# Patient Record
Sex: MALE | Race: WHITE | NOT HISPANIC OR LATINO | URBAN - METROPOLITAN AREA
[De-identification: names, ages, dates, MRNs, and addresses within clinical notes are randomized per-mention and may not be internally consistent; named-entity substitution may affect disease eponyms.]

---

## 2021-04-14 ENCOUNTER — EMERGENCY (EMERGENCY)
Facility: HOSPITAL | Age: 56
LOS: 1 days | Discharge: LEFT BEFORE DISCHARGE | End: 2021-04-14
Attending: STUDENT IN AN ORGANIZED HEALTH CARE EDUCATION/TRAINING PROGRAM
Payer: COMMERCIAL

## 2021-04-14 VITALS
HEIGHT: 74 IN | TEMPERATURE: 98 F | WEIGHT: 210.1 LBS | HEART RATE: 80 BPM | SYSTOLIC BLOOD PRESSURE: 168 MMHG | DIASTOLIC BLOOD PRESSURE: 98 MMHG | OXYGEN SATURATION: 98 % | RESPIRATION RATE: 16 BRPM

## 2021-04-14 PROCEDURE — 99283 EMERGENCY DEPT VISIT LOW MDM: CPT

## 2021-04-14 PROCEDURE — L9991: CPT

## 2021-04-14 PROCEDURE — 93005 ELECTROCARDIOGRAM TRACING: CPT

## 2021-04-14 RX ORDER — SODIUM CHLORIDE 9 MG/ML
3 INJECTION INTRAMUSCULAR; INTRAVENOUS; SUBCUTANEOUS EVERY 8 HOURS
Refills: 0 | Status: DISCONTINUED | OUTPATIENT
Start: 2021-04-14 | End: 2021-04-18

## 2021-04-14 RX ORDER — SODIUM CHLORIDE 9 MG/ML
500 INJECTION INTRAMUSCULAR; INTRAVENOUS; SUBCUTANEOUS ONCE
Refills: 0 | Status: DISCONTINUED | OUTPATIENT
Start: 2021-04-14 | End: 2021-04-18

## 2021-04-14 RX ORDER — KETOROLAC TROMETHAMINE 30 MG/ML
15 SYRINGE (ML) INJECTION ONCE
Refills: 0 | Status: COMPLETED | OUTPATIENT
Start: 2021-04-14 | End: 2021-04-14

## 2021-04-14 NOTE — ED PROVIDER NOTE - CLINICAL SUMMARY MEDICAL DECISION MAKING FREE TEXT BOX
Left without being seen. Pt demanding to leave. See HPI. No reason to deny decisional capacity at this time.

## 2021-04-14 NOTE — ED ADULT TRIAGE NOTE - ARRIVAL FROM
Post-Care Instructions: LIQUID NITROGEN TREATMENT\\n(Cryosurgery)\\n\\n Liquid Nitrogen (LN2) is a cold, liquefied gas with a temperature of 196 degrees below zero Celsius (minus 321 degrees Fahrenheit).  It is used to freeze and destroy superficial skin growths such as warts and keratoses.  It can also be used to treat pre-malignant skin lesions known as actinic keratoses.  LN2 causes stinging and mild pain while the growth is being frozen and then thaws.  The discomfort usually lasts less than fifteen minutes.  On the fingers a throbbing pain will occasionally last  a few hours.\\n After LN2 treatment your skin will become swollen and red and it may blister.  Then a scab will form.  It will fall off by itself in one to three weeks.  The skin growth will come off with the scab, leaving healthy, new skin.\\n Generally, no special care is needed after liquid nitrogen treatment.  Just ignore it.  You can wash your skin as usual and use make-up or other cosmetics.  If clothing irritates the area, cover it with a small bandage (Band-Aid).\\n If a very large or uncomfortable blister develops you may open it with a sterilized needle.  The needle can be sterilized by wiping with rubbing alcohol.  Gently clean the blistered area with soap and water, followed by alcohol.  Insert the needle into the edge of the blister as needed to allow the blister contents to escape.  Press the blister gently to force out the fluid or blood.  This will reduce pain caused by blister fluid pressure.  If a blister re-forms it may be opened again.  Do not remove the top of the blister since its presence helps prevent infection until the new skin beneath can replace it.  Protect open sores or punctured blisters with Polysporin or Bacitracin antibiotic ointment (available without a prescription) under a Band-Aid for 24-48 hours.\\n If you notice any signs of infection such as oozing of a discolored substance (pus), spreading redness, excessive swelling or increased pain usually appearing 2 days or more after the office treatment, please call the office.  Your usual pain reliever (such as Tylenol and aspirin) is normally sufficient to alleviate discomfort.  Intermittent use of an ice pack for 5 to 10 minutes each hour can also be used if needed.  If you have severe pain, please call the office.  Do not pick at crusted areas.  Allow them to come off on their own.\\n If a lesion was treated near your eye, you may notice swelling of one or both lids within 24 hours, so that your lids are partially closed.  This is harmless, will not affect your vision, and will resolve by itself in a day or two.\\n Sometimes LN2 fails or does not completely remove the growth.  This is especially true with larger warts, which often require more than a single treatment for cure.  If the growth is not cured with LN2 and you do not have a scheduled follow-up appointment for further treatment, please call to make a return appointment. Duration Of Freeze Thaw-Cycle (Seconds): 5 Consent: The patient's consent was obtained including but not limited to risks of crusting, scabbing, blistering, scarring, darker or lighter pigmentary change, recurrence, incomplete removal and infection. Total Number Of Aks Treated: 7 Detail Level: Simple Home Render In Bullet Format When Appropriate: No

## 2021-04-14 NOTE — ED PROVIDER NOTE - OBJECTIVE STATEMENT
Pt states that he no longer wants to be seen because "I want to go to an ER close to my house, get me out of here." LWBS.

## 2022-03-11 ENCOUNTER — EMERGENCY (EMERGENCY)
Facility: HOSPITAL | Age: 57
LOS: 1 days | Discharge: LEFT BEFORE DISCHARGE | End: 2022-03-11
Attending: EMERGENCY MEDICINE
Payer: COMMERCIAL

## 2022-03-11 VITALS
TEMPERATURE: 98 F | WEIGHT: 210.32 LBS | HEIGHT: 74 IN | HEART RATE: 86 BPM | RESPIRATION RATE: 20 BRPM | DIASTOLIC BLOOD PRESSURE: 101 MMHG | SYSTOLIC BLOOD PRESSURE: 163 MMHG | OXYGEN SATURATION: 98 %

## 2022-03-11 LAB
ALBUMIN SERPL ELPH-MCNC: 3.5 G/DL — SIGNIFICANT CHANGE UP (ref 3.5–5)
ALP SERPL-CCNC: 94 U/L — SIGNIFICANT CHANGE UP (ref 40–120)
ALT FLD-CCNC: 124 U/L DA — HIGH (ref 10–60)
ANION GAP SERPL CALC-SCNC: 6 MMOL/L — SIGNIFICANT CHANGE UP (ref 5–17)
AST SERPL-CCNC: 149 U/L — HIGH (ref 10–40)
BASOPHILS # BLD AUTO: 0.06 K/UL — SIGNIFICANT CHANGE UP (ref 0–0.2)
BASOPHILS NFR BLD AUTO: 1.1 % — SIGNIFICANT CHANGE UP (ref 0–2)
BILIRUB SERPL-MCNC: 0.7 MG/DL — SIGNIFICANT CHANGE UP (ref 0.2–1.2)
BUN SERPL-MCNC: 5 MG/DL — LOW (ref 7–18)
CALCIUM SERPL-MCNC: 8.3 MG/DL — LOW (ref 8.4–10.5)
CHLORIDE SERPL-SCNC: 109 MMOL/L — HIGH (ref 96–108)
CO2 SERPL-SCNC: 26 MMOL/L — SIGNIFICANT CHANGE UP (ref 22–31)
CREAT SERPL-MCNC: 0.79 MG/DL — SIGNIFICANT CHANGE UP (ref 0.5–1.3)
EGFR: 105 ML/MIN/1.73M2 — SIGNIFICANT CHANGE UP
EOSINOPHIL # BLD AUTO: 0.09 K/UL — SIGNIFICANT CHANGE UP (ref 0–0.5)
EOSINOPHIL NFR BLD AUTO: 1.7 % — SIGNIFICANT CHANGE UP (ref 0–6)
GLUCOSE SERPL-MCNC: 108 MG/DL — HIGH (ref 70–99)
HCT VFR BLD CALC: 45.4 % — SIGNIFICANT CHANGE UP (ref 39–50)
HGB BLD-MCNC: 15.9 G/DL — SIGNIFICANT CHANGE UP (ref 13–17)
IMM GRANULOCYTES NFR BLD AUTO: 0 % — SIGNIFICANT CHANGE UP (ref 0–1.5)
LIDOCAIN IGE QN: 136 U/L — SIGNIFICANT CHANGE UP (ref 73–393)
LYMPHOCYTES # BLD AUTO: 1.72 K/UL — SIGNIFICANT CHANGE UP (ref 1–3.3)
LYMPHOCYTES # BLD AUTO: 32.1 % — SIGNIFICANT CHANGE UP (ref 13–44)
MCHC RBC-ENTMCNC: 34.9 PG — HIGH (ref 27–34)
MCHC RBC-ENTMCNC: 35 GM/DL — SIGNIFICANT CHANGE UP (ref 32–36)
MCV RBC AUTO: 99.8 FL — SIGNIFICANT CHANGE UP (ref 80–100)
MONOCYTES # BLD AUTO: 0.66 K/UL — SIGNIFICANT CHANGE UP (ref 0–0.9)
MONOCYTES NFR BLD AUTO: 12.3 % — SIGNIFICANT CHANGE UP (ref 2–14)
NEUTROPHILS # BLD AUTO: 2.83 K/UL — SIGNIFICANT CHANGE UP (ref 1.8–7.4)
NEUTROPHILS NFR BLD AUTO: 52.8 % — SIGNIFICANT CHANGE UP (ref 43–77)
NRBC # BLD: 0 /100 WBCS — SIGNIFICANT CHANGE UP (ref 0–0)
PLATELET # BLD AUTO: 96 K/UL — LOW (ref 150–400)
POTASSIUM SERPL-MCNC: 3.9 MMOL/L — SIGNIFICANT CHANGE UP (ref 3.5–5.3)
POTASSIUM SERPL-SCNC: 3.9 MMOL/L — SIGNIFICANT CHANGE UP (ref 3.5–5.3)
PROT SERPL-MCNC: 7.4 G/DL — SIGNIFICANT CHANGE UP (ref 6–8.3)
RBC # BLD: 4.55 M/UL — SIGNIFICANT CHANGE UP (ref 4.2–5.8)
RBC # FLD: 12 % — SIGNIFICANT CHANGE UP (ref 10.3–14.5)
SODIUM SERPL-SCNC: 141 MMOL/L — SIGNIFICANT CHANGE UP (ref 135–145)
TSH SERPL-MCNC: 3.12 UU/ML — SIGNIFICANT CHANGE UP (ref 0.34–4.82)
WBC # BLD: 5.36 K/UL — SIGNIFICANT CHANGE UP (ref 3.8–10.5)
WBC # FLD AUTO: 5.36 K/UL — SIGNIFICANT CHANGE UP (ref 3.8–10.5)

## 2022-03-11 PROCEDURE — 36415 COLL VENOUS BLD VENIPUNCTURE: CPT

## 2022-03-11 PROCEDURE — 83690 ASSAY OF LIPASE: CPT

## 2022-03-11 PROCEDURE — 84443 ASSAY THYROID STIM HORMONE: CPT

## 2022-03-11 PROCEDURE — 99283 EMERGENCY DEPT VISIT LOW MDM: CPT

## 2022-03-11 PROCEDURE — 82962 GLUCOSE BLOOD TEST: CPT

## 2022-03-11 PROCEDURE — 93005 ELECTROCARDIOGRAM TRACING: CPT

## 2022-03-11 PROCEDURE — 85025 COMPLETE CBC W/AUTO DIFF WBC: CPT

## 2022-03-11 PROCEDURE — 80053 COMPREHEN METABOLIC PANEL: CPT

## 2022-03-11 PROCEDURE — 93010 ELECTROCARDIOGRAM REPORT: CPT

## 2022-03-11 PROCEDURE — 99285 EMERGENCY DEPT VISIT HI MDM: CPT

## 2022-03-11 NOTE — ED ADULT TRIAGE NOTE - CHIEF COMPLAINT QUOTE
As per patient " I can't feel my body". I have no strength in my upper body started 2 hours ago, numbness to lower extremities x 6 months ago. Pt admits to drinking a glass of wine this afternoon. Pt took an Aspirin this morning. C/o shortness breath, dizziness and palpitation earlier in the day. Denies any no chest pain, headache, lightheadedness, nausea and vomiting.

## 2022-03-11 NOTE — ED ADULT NURSE NOTE - OBJECTIVE STATEMENT
56M presents to ED for "not being to feel my body". Pt states that 3-4 months ago he stopped being able to feel his legs -  pt denies weakness but just endorses numbness. Pt endorsing having 2 glasses of wine prior to arrival. Pt endorsing "occasional" alcohol use.

## 2022-03-11 NOTE — SBIRT NOTE ADULT - NSSBIRTALCPOSREINDET_GEN_A_CORE
Pt admitted to occasional alcohol consumption and claimed not to have any problem with his alcohol usage. Psychoeducation re alcohol and Emotional support provided .

## 2022-03-11 NOTE — ED PROVIDER NOTE - OBJECTIVE STATEMENT
56 y.o male with no PMHx presents that he can't feel his body for the past 6 months, especially his legs. Patient has been to multiple hospitals and had blood work done with no cause found. Patient also relates drinking wine this afternoon. He had palpitations and shortness of breath earlier, but denies any current complaints. Patient states he has been feeling these symptoms ever since his Mother passed in November. He is also undergoing a divorce. Patient denies SI, HI, or drug use.

## 2022-03-11 NOTE — CHART NOTE - NSCHARTNOTEFT_GEN_A_CORE
Pt is a 56 year old male with pmhx of etoh abuse. He came to the ED today complaining  that he could not feel his body for the past 6 months, especially his legs. Pt screened positive for sbirt and was seen at bedside re consult. Pt appeared alert and orientedx3. Kt introduced self and role explained. Pt participated in sbirt screening, admitted to occasional alcohol consumption and claimed not to have any problem with his alcohol usage. Psychoeducation re alcohol and Emotional support provided . Sbirt screen completed in sunrise. Pt was socially cleared but eloped from the ED.

## 2022-03-11 NOTE — ED PROVIDER NOTE - CLINICAL SUMMARY MEDICAL DECISION MAKING FREE TEXT BOX
56 y.o male complaining of lack of feeling of his body. Normal neuro exam. Symptoms may be due to anxiety/stress. Patient is in no acute distress. Get labs, TSH, and reassess.

## 2022-03-13 ENCOUNTER — INPATIENT (INPATIENT)
Facility: HOSPITAL | Age: 57
LOS: 14 days | Discharge: NURSING, REHAB OR LONG-TERM CARE | DRG: 897 | End: 2022-03-28
Attending: STUDENT IN AN ORGANIZED HEALTH CARE EDUCATION/TRAINING PROGRAM | Admitting: STUDENT IN AN ORGANIZED HEALTH CARE EDUCATION/TRAINING PROGRAM
Payer: COMMERCIAL

## 2022-03-13 VITALS
RESPIRATION RATE: 18 BRPM | DIASTOLIC BLOOD PRESSURE: 98 MMHG | WEIGHT: 210.1 LBS | HEIGHT: 74 IN | HEART RATE: 96 BPM | SYSTOLIC BLOOD PRESSURE: 144 MMHG | TEMPERATURE: 99 F | OXYGEN SATURATION: 96 %

## 2022-03-13 DIAGNOSIS — F10.10 ALCOHOL ABUSE, UNCOMPLICATED: ICD-10-CM

## 2022-03-13 DIAGNOSIS — R26.2 DIFFICULTY IN WALKING, NOT ELSEWHERE CLASSIFIED: ICD-10-CM

## 2022-03-13 DIAGNOSIS — R53.1 WEAKNESS: ICD-10-CM

## 2022-03-13 DIAGNOSIS — Z29.9 ENCOUNTER FOR PROPHYLACTIC MEASURES, UNSPECIFIED: ICD-10-CM

## 2022-03-13 DIAGNOSIS — D69.6 THROMBOCYTOPENIA, UNSPECIFIED: ICD-10-CM

## 2022-03-13 DIAGNOSIS — R74.01 ELEVATION OF LEVELS OF LIVER TRANSAMINASE LEVELS: ICD-10-CM

## 2022-03-13 LAB
ALBUMIN SERPL ELPH-MCNC: 3.8 G/DL — SIGNIFICANT CHANGE UP (ref 3.5–5)
ALP SERPL-CCNC: 94 U/L — SIGNIFICANT CHANGE UP (ref 40–120)
ALT FLD-CCNC: 118 U/L DA — HIGH (ref 10–60)
ANION GAP SERPL CALC-SCNC: 11 MMOL/L — SIGNIFICANT CHANGE UP (ref 5–17)
AST SERPL-CCNC: 125 U/L — HIGH (ref 10–40)
BASOPHILS # BLD AUTO: 0.07 K/UL — SIGNIFICANT CHANGE UP (ref 0–0.2)
BASOPHILS NFR BLD AUTO: 1.2 % — SIGNIFICANT CHANGE UP (ref 0–2)
BILIRUB SERPL-MCNC: 1.1 MG/DL — SIGNIFICANT CHANGE UP (ref 0.2–1.2)
BUN SERPL-MCNC: 7 MG/DL — SIGNIFICANT CHANGE UP (ref 7–18)
CALCIUM SERPL-MCNC: 8.7 MG/DL — SIGNIFICANT CHANGE UP (ref 8.4–10.5)
CHLORIDE SERPL-SCNC: 104 MMOL/L — SIGNIFICANT CHANGE UP (ref 96–108)
CO2 SERPL-SCNC: 24 MMOL/L — SIGNIFICANT CHANGE UP (ref 22–31)
CREAT SERPL-MCNC: 0.83 MG/DL — SIGNIFICANT CHANGE UP (ref 0.5–1.3)
EGFR: 103 ML/MIN/1.73M2 — SIGNIFICANT CHANGE UP
EOSINOPHIL # BLD AUTO: 0.04 K/UL — SIGNIFICANT CHANGE UP (ref 0–0.5)
EOSINOPHIL NFR BLD AUTO: 0.7 % — SIGNIFICANT CHANGE UP (ref 0–6)
ETHANOL SERPL-MCNC: 23 MG/DL — HIGH (ref 0–10)
GLUCOSE SERPL-MCNC: 113 MG/DL — HIGH (ref 70–99)
HCT VFR BLD CALC: 42.4 % — SIGNIFICANT CHANGE UP (ref 39–50)
HGB BLD-MCNC: 14.9 G/DL — SIGNIFICANT CHANGE UP (ref 13–17)
IMM GRANULOCYTES NFR BLD AUTO: 0.2 % — SIGNIFICANT CHANGE UP (ref 0–1.5)
LIDOCAIN IGE QN: 125 U/L — SIGNIFICANT CHANGE UP (ref 73–393)
LYMPHOCYTES # BLD AUTO: 1.07 K/UL — SIGNIFICANT CHANGE UP (ref 1–3.3)
LYMPHOCYTES # BLD AUTO: 18.4 % — SIGNIFICANT CHANGE UP (ref 13–44)
MCHC RBC-ENTMCNC: 34.9 PG — HIGH (ref 27–34)
MCHC RBC-ENTMCNC: 35.1 GM/DL — SIGNIFICANT CHANGE UP (ref 32–36)
MCV RBC AUTO: 99.3 FL — SIGNIFICANT CHANGE UP (ref 80–100)
MONOCYTES # BLD AUTO: 0.77 K/UL — SIGNIFICANT CHANGE UP (ref 0–0.9)
MONOCYTES NFR BLD AUTO: 13.2 % — SIGNIFICANT CHANGE UP (ref 2–14)
NEUTROPHILS # BLD AUTO: 3.86 K/UL — SIGNIFICANT CHANGE UP (ref 1.8–7.4)
NEUTROPHILS NFR BLD AUTO: 66.3 % — SIGNIFICANT CHANGE UP (ref 43–77)
NRBC # BLD: 0 /100 WBCS — SIGNIFICANT CHANGE UP (ref 0–0)
PLATELET # BLD AUTO: 79 K/UL — LOW (ref 150–400)
POTASSIUM SERPL-MCNC: 3.6 MMOL/L — SIGNIFICANT CHANGE UP (ref 3.5–5.3)
POTASSIUM SERPL-SCNC: 3.6 MMOL/L — SIGNIFICANT CHANGE UP (ref 3.5–5.3)
PROT SERPL-MCNC: 7.4 G/DL — SIGNIFICANT CHANGE UP (ref 6–8.3)
RBC # BLD: 4.27 M/UL — SIGNIFICANT CHANGE UP (ref 4.2–5.8)
RBC # FLD: 12 % — SIGNIFICANT CHANGE UP (ref 10.3–14.5)
SARS-COV-2 RNA SPEC QL NAA+PROBE: SIGNIFICANT CHANGE UP
SODIUM SERPL-SCNC: 139 MMOL/L — SIGNIFICANT CHANGE UP (ref 135–145)
TROPONIN I, HIGH SENSITIVITY RESULT: 10.2 NG/L — SIGNIFICANT CHANGE UP
WBC # BLD: 5.82 K/UL — SIGNIFICANT CHANGE UP (ref 3.8–10.5)
WBC # FLD AUTO: 5.82 K/UL — SIGNIFICANT CHANGE UP (ref 3.8–10.5)

## 2022-03-13 PROCEDURE — 71045 X-RAY EXAM CHEST 1 VIEW: CPT | Mod: 26

## 2022-03-13 PROCEDURE — 93010 ELECTROCARDIOGRAM REPORT: CPT

## 2022-03-13 PROCEDURE — 70450 CT HEAD/BRAIN W/O DYE: CPT | Mod: 26,MA

## 2022-03-13 PROCEDURE — 99223 1ST HOSP IP/OBS HIGH 75: CPT

## 2022-03-13 PROCEDURE — 99285 EMERGENCY DEPT VISIT HI MDM: CPT

## 2022-03-13 RX ORDER — THIAMINE MONONITRATE (VIT B1) 100 MG
500 TABLET ORAL DAILY
Refills: 0 | Status: DISCONTINUED | OUTPATIENT
Start: 2022-03-13 | End: 2022-03-14

## 2022-03-13 RX ORDER — HEPARIN SODIUM 5000 [USP'U]/ML
5000 INJECTION INTRAVENOUS; SUBCUTANEOUS EVERY 8 HOURS
Refills: 0 | Status: DISCONTINUED | OUTPATIENT
Start: 2022-03-13 | End: 2022-03-14

## 2022-03-13 RX ORDER — GABAPENTIN 400 MG/1
100 CAPSULE ORAL THREE TIMES A DAY
Refills: 0 | Status: DISCONTINUED | OUTPATIENT
Start: 2022-03-13 | End: 2022-03-15

## 2022-03-13 RX ORDER — FOLIC ACID 0.8 MG
1 TABLET ORAL ONCE
Refills: 0 | Status: COMPLETED | OUTPATIENT
Start: 2022-03-13 | End: 2022-03-13

## 2022-03-13 RX ORDER — METOCLOPRAMIDE HCL 10 MG
10 TABLET ORAL ONCE
Refills: 0 | Status: COMPLETED | OUTPATIENT
Start: 2022-03-13 | End: 2022-03-13

## 2022-03-13 RX ORDER — FOLIC ACID 0.8 MG
1 TABLET ORAL DAILY
Refills: 0 | Status: DISCONTINUED | OUTPATIENT
Start: 2022-03-13 | End: 2022-03-15

## 2022-03-13 RX ORDER — THIAMINE MONONITRATE (VIT B1) 100 MG
100 TABLET ORAL ONCE
Refills: 0 | Status: COMPLETED | OUTPATIENT
Start: 2022-03-13 | End: 2022-03-13

## 2022-03-13 RX ORDER — SODIUM CHLORIDE 9 MG/ML
1000 INJECTION INTRAMUSCULAR; INTRAVENOUS; SUBCUTANEOUS ONCE
Refills: 0 | Status: COMPLETED | OUTPATIENT
Start: 2022-03-13 | End: 2022-03-13

## 2022-03-13 RX ORDER — KETOROLAC TROMETHAMINE 30 MG/ML
30 SYRINGE (ML) INJECTION ONCE
Refills: 0 | Status: DISCONTINUED | OUTPATIENT
Start: 2022-03-13 | End: 2022-03-13

## 2022-03-13 RX ORDER — INSULIN LISPRO 100/ML
VIAL (ML) SUBCUTANEOUS
Refills: 0 | Status: DISCONTINUED | OUTPATIENT
Start: 2022-03-13 | End: 2022-03-16

## 2022-03-13 RX ADMIN — Medication 30 MILLIGRAM(S): at 14:13

## 2022-03-13 RX ADMIN — Medication 100 MILLIGRAM(S): at 14:13

## 2022-03-13 RX ADMIN — GABAPENTIN 100 MILLIGRAM(S): 400 CAPSULE ORAL at 23:29

## 2022-03-13 RX ADMIN — Medication 10 MILLIGRAM(S): at 20:36

## 2022-03-13 RX ADMIN — Medication 10 MILLIGRAM(S): at 14:13

## 2022-03-13 RX ADMIN — Medication 2 MILLIGRAM(S): at 21:12

## 2022-03-13 RX ADMIN — Medication 1 MILLIGRAM(S): at 15:26

## 2022-03-13 RX ADMIN — HEPARIN SODIUM 5000 UNIT(S): 5000 INJECTION INTRAVENOUS; SUBCUTANEOUS at 23:30

## 2022-03-13 RX ADMIN — Medication 30 MILLIGRAM(S): at 21:26

## 2022-03-13 RX ADMIN — Medication 1 MILLIGRAM(S): at 14:14

## 2022-03-13 RX ADMIN — SODIUM CHLORIDE 1000 MILLILITER(S): 9 INJECTION INTRAMUSCULAR; INTRAVENOUS; SUBCUTANEOUS at 14:14

## 2022-03-13 NOTE — H&P ADULT - PROBLEM SELECTOR PLAN 2
- Patient has hx of alcoholic use   - Unknown how much he drinks  - Alcohol level   - Will start CIWA protocol with IV ativan 2mg q2 PRN for CIWA >8  - Will get Urine screen   -

## 2022-03-13 NOTE — ED PROVIDER NOTE - PROGRESS NOTE DETAILS
patient resenting comfortably. no emergent findings on labs on ct head. will sign patient out to Dr. Stein pending reassessment. will likely be discharged. Darren [place] pt got up unable to walk unsteady gait , as per pt he drinks daily wine , brother states he drinks every day    but he doesn't know exactly how much    + tremor , + tongue fasciculations pt got up unable to walk unsteady gait , as per pt he drinks daily wine , brother states he drinks every day    but he doesn't know exactly how much    + tremor , + tongue fasciculations   c/o numbness lower extremities since august , reflexes are normal, no bowel or bladder dysfunction

## 2022-03-13 NOTE — ED ADULT NURSE NOTE - CHIEF COMPLAINT QUOTE
BIBA called by brought generalized weakness with tremors last drank 2 glass wine last night as per patient brother [phone] Ck

## 2022-03-13 NOTE — H&P ADULT - ATTENDING COMMENTS
Pt seen at bedside.  Case discussed with MAR.     56 year old man with PMH of chronic alcohol abuse here with acute onset difficulty ambulating with generalized weakness. He was found on the floor by family weak. No LOC, no focal weakness, no GI symptoms. Long standing hx of numbness in his legs and was in the ED a day prior for this; work up for this has remained unremarkable.    Vital Signs Last 24 Hrs  T(C): 36.7 (13 Mar 2022 19:53), Max: 37.4 (13 Mar 2022 13:38)  T(F): 98.1 (13 Mar 2022 19:53), Max: 99.4 (13 Mar 2022 13:38)  HR: 91 (13 Mar 2022 19:58) (64 - 183)  BP: 153/81 (13 Mar 2022 19:53) (144/98 - 153/81)  RR: 17 (13 Mar 2022 19:53) (17 - 18)  SpO2: 96% (13 Mar 2022 19:53) (96% - 96%)      Labs                         14.9   5.82  )-----------( 79       ( 13 Mar 2022 14:08 )             42.4              03-13    139  |  104  |  7   -------------------<  113<H>  3.6   |  24  |  0.83    Ca    8.7      13 Mar 2022 14:08    TPro  7.4  /  Alb  3.8  /  TBili  1.1  /  DBili  x   /  AST  125<H>  /  ALT  118<H>  /  AlkPhos  94  03-13      thrombocytopenia  Mild transaminases  Mild alcohol elevation  CT Head =unremarkable  EKG - unremarkable     Impression   - Acute ambulatory dysfunction   - Physical deconditioning  - Mild alcoholic liver disease    Plan   - Admit to Medicine   - Fall precaution   - Gentle hydration with isotonic fluids  - PT evaluation and treatment  - RUQ U/S  - Check hep c antibodies  - Alcohol cessation counselling  - Obtain mental health outpatient follow up for suspected prolonged grief Pt seen at bedside.  Case discussed with MAR.     56 year old man with PMH of chronic alcohol abuse here with acute onset difficulty ambulating with generalized weakness. He was found on the floor by family weak. No LOC, no focal weakness, no GI symptoms. Long standing hx of numbness in his legs and was in the ED a day prior for this; work up for this has remained unremarkable.    Vital Signs Last 24 Hrs  T(C): 36.7 (13 Mar 2022 19:53), Max: 37.4 (13 Mar 2022 13:38)  T(F): 98.1 (13 Mar 2022 19:53), Max: 99.4 (13 Mar 2022 13:38)  HR: 91 (13 Mar 2022 19:58) (64 - 183)  BP: 153/81 (13 Mar 2022 19:53) (144/98 - 153/81)  RR: 17 (13 Mar 2022 19:53) (17 - 18)  SpO2: 96% (13 Mar 2022 19:53) (96% - 96%)    Exam   Sensory (fine touch) deficits below the umbilicus   Power - 4+/5 in upper extremities; no pronator drift              4/5 in lower extremities -     Labs                         14.9   5.82  )-----------( 79       ( 13 Mar 2022 14:08 )             42.4              03-13    139  |  104  |  7   -------------------<  113<H>  3.6   |  24  |  0.83    Ca    8.7      13 Mar 2022 14:08    TPro  7.4  /  Alb  3.8  /  TBili  1.1  /  DBili  x   /  AST  125<H>  /  ALT  118<H>  /  AlkPhos  94  03-13      thrombocytopenia  Mild transaminases  Mild alcohol elevation  CT Head =unremarkable  EKG - unremarkable     Impression   - T 10 sensory deficits   - Acute ambulatory dysfunction   - Physical deconditioning  - Mild alcoholic liver disease  - Alcohol abuse     Plan   - Admit to Medicine   - Fall precaution   - Neurology consult   - CT thoracolumbar spine   - Gentle hydration with isotonic fluids  - [facility] protocol  - PT evaluation and treatment  - RUQ U/S  - Check hep c antibodies  - Alcohol cessation counselling  - Obtain mental health outpatient follow up grief vs clinical depression. Pt seen at bedside.  Case discussed with MAR.     56 year old man with PMH of chronic alcohol abuse here with acute onset difficulty ambulating with generalized weakness. He was found on the floor by family weak. No LOC, no focal weakness, no GI symptoms. Long standing hx of numbness in his legs and was in the ED a day prior for this; work up for this has remained unremarkable.    Vital Signs Last 24 Hrs  T(C): 36.7 (13 Mar 2022 19:53), Max: 37.4 (13 Mar 2022 13:38)  T(F): 98.1 (13 Mar 2022 19:53), Max: 99.4 (13 Mar 2022 13:38)  HR: 91 (13 Mar 2022 19:58) (64 - 183)  BP: 153/81 (13 Mar 2022 19:53) (144/98 - 153/81)  RR: 17 (13 Mar 2022 19:53) (17 - 18)  SpO2: 96% (13 Mar 2022 19:53) (96% - 96%)    Exam   Sensory (fine touch) deficits below the umbilicus   Power - 4+/5 in upper extremities; no pronator drift              4/5 in lower extremities -     Labs                         14.9   5.82  )-----------( 79       ( 13 Mar 2022 14:08 )             42.4              03-13    139  |  104  |  7   -------------------<  113<H>  3.6   |  24  |  0.83    Ca    8.7      13 Mar 2022 14:08    TPro  7.4  /  Alb  3.8  /  TBili  1.1  /  DBili  x   /  AST  125<H>  /  ALT  118<H>  /  AlkPhos  94  03-13      thrombocytopenia  Mild transaminases  Mild alcohol elevation  CT Head =unremarkable  EKG - unremarkable     Impression   - T 10 sensory neuropathy  r/o DM vs other causes   - Acute ambulatory dysfunction   - Physical deconditioning  - Mild alcoholic liver disease  - Alcohol abuse     Plan   - Admit to Medicine   - Fall precaution   - Neurology consult   - MRI thoracolumbar spine   - Gentle hydration with isotonic fluids  - [facility] protocol  - PT evaluation and treatment  - RUQ U/S  - Check hep c antibodies  - Alcohol cessation counselling  - Obtain mental health outpatient follow up grief vs clinical depression.

## 2022-03-13 NOTE — H&P ADULT - ASSESSMENT
soha is 56 years old male with past medical history of chronic alcohol use was brought to ED by family member after he was found on floor in home today afternoon. during Examination, Patient was AAOx1-2 but could be due to Ativan he got in ED. Attempted to call Ck but didn't answer. So information was obtained from charts. Patient has been complaining of generalized weakness and he said "I didn't feel my legs". Patient reports that he fell many times in last month. Patient admits that he drinks alcohol occasionally and not sure when the last time he drunk. CT head was negative for any acute abnormalities. Alcohol level 23. Elevated liver enzymes. Patient will be admitted for further care.

## 2022-03-13 NOTE — H&P ADULT - NSHPPHYSICALEXAM_GEN_ALL_CORE
Vital Signs Last 24 Hrs  T(C): 36.7 (13 Mar 2022 19:53), Max: 37.4 (13 Mar 2022 13:38)  T(F): 98.1 (13 Mar 2022 19:53), Max: 99.4 (13 Mar 2022 13:38)  HR: 91 (13 Mar 2022 19:58) (64 - 183)  BP: 153/81 (13 Mar 2022 19:53) (144/98 - 153/81)  RR: 17 (13 Mar 2022 19:53) (17 - 18)  SpO2: 96% (13 Mar 2022 19:53) (96% - 96%)    Physical exams:   General: uncomfortable appearing male, no acute distress   HEENT: normocephalic, atraumatic, PERRL  Respiratory: normal work of breathing  Abdomen: soft, non-tender, no guarding or rebound   MSK: no swelling or tenderness of lower extremities, moving all extremities spontaneously   Skin: warm, dry   Neuro: A&Ox1-2, cranial nerves II-XII intact, upper extremity tremors, 5/5 strength of upper extremities   Psych: appropriate affect Vital Signs Last 24 Hrs  T(C): 36.7 (13 Mar 2022 19:53), Max: 37.4 (13 Mar 2022 13:38)  T(F): 98.1 (13 Mar 2022 19:53), Max: 99.4 (13 Mar 2022 13:38)  HR: 91 (13 Mar 2022 19:58) (64 - 183)  BP: 153/81 (13 Mar 2022 19:53) (144/98 - 153/81)  RR: 17 (13 Mar 2022 19:53) (17 - 18)  SpO2: 96% (13 Mar 2022 19:53) (96% - 96%)    Physical exams:   General: uncomfortable appearing male, no acute distress   HEENT: normocephalic, atraumatic, PERRL  Respiratory: normal work of breathing  Abdomen: soft, non-tender, no guarding or rebound   MSK: no swelling or tenderness of lower extremities, moving all extremities spontaneously   Skin: warm, dry   Neuro: A&Ox1-2, sensory deficits from toes to umbilical level   Psych: appropriate affect

## 2022-03-13 NOTE — ED PROVIDER NOTE - OBJECTIVE STATEMENT
56M, no significant pmh, presenting with weakness. patient reports his brother called 911 because he found the patient on the floor and the patient was having trouble moving. reports headache that began approximately 1 hour prior to arrival. no changes in vision. has had ongoing numbness in his legs for months, as well as chest pain and shortness of breath. reports he last drink alcohol last night, 2 glasses of wine. spoke with patient's brother Ck who confirmed he found the patient on the floor this morning, but the patient has been complaining of similar symptoms for several months. patient lives in [place] but is going through a divorce. has been staying in the mother's apartment downstairs from the brother.

## 2022-03-13 NOTE — ED ADULT NURSE NOTE - NSIMPLEMENTINTERV_GEN_ALL_ED
Implemented All Fall Risk Interventions:  [place] to call system. Call bell, personal items and telephone within reach. Instruct patient to call for assistance. Room bathroom lighting operational. Non-slip footwear when patient is off stretcher. Physically safe environment: no spills, clutter or unnecessary equipment. Stretcher in lowest position, wheels locked, appropriate side rails in place. Provide visual cue, wrist band, yellow gown, etc. Monitor gait and stability. Monitor for mental status changes and reorient to person, place, and time. Review medications for side effects contributing to fall risk. Reinforce activity limits and safety measures with patient and family.

## 2022-03-13 NOTE — H&P ADULT - HISTORY OF PRESENT ILLNESS
Patient is 56 years old male with past medical history of chronic alcohol use was brought to ED by family member after he was found on floor in home today afternoon. during Examination, Patient was AAOx1-2 but could be due to Ativan he got in ED. Attempted to call Ck but didn't answer. So information was obtained from charts. Patient has been complaining of generalized weakness and he said "I didn't feel my legs". Patient reports that he fell many times in last month. Patient admits that he drinks alcohol occasionally and not sure when the last time he drunk. On Examination; Patient had sensory deficits but able to move his extremities. Patient is able to follow commands. Patient denied leg pain, recent trauma except falls, chest pain, abdominal pain, N/V or change in bowel movements.

## 2022-03-13 NOTE — ED PROVIDER NOTE - PATIENT PORTAL LINK FT
You can access the FollowMyHealth Patient Portal offered by [facility] by registering at the following website: [email or web address]. By joining [facility]’s FollowMyHealth portal, you will also be able to view your health information using other applications (apps) compatible with our system.

## 2022-03-13 NOTE — ED ADULT TRIAGE NOTE - CHIEF COMPLAINT QUOTE
BIBA called by brought generalized weakness with tremors last drank 2 glass wine last night as per patient brother [phone] Ck

## 2022-03-13 NOTE — H&P ADULT - PROBLEM SELECTOR PLAN 1
- Patient with hx of weakness was brought after he was found on floor.  - Patient is complaining of ambulatory dysfunction  - Dec sensation over bilateral lower extremities   - CT head didn't show any acute deficits or hemorrhage.  - Will consult neurology   - Will get EEG   - Fall precautions   - Bed rest   - Physical therapy - Patient with hx of weakness was brought after he was found on floor.  - Patient is complaining of ambulatory dysfunction  - Dec sensation over bilateral lower extremities   - CT head didn't show any acute deficits or hemorrhage.  - Patient has sensory deficits from toes to umbilical level ( fine touch and pressure )  - MRI thoracic and lumbar ordered   - Will consult neurology   - Will get EEG   - Fall precautions   - Bed rest   - Physical therapy

## 2022-03-13 NOTE — ED PROVIDER NOTE - PHYSICAL EXAMINATION
General: uncomfortable appearing male, no acute distress   HEENT: normocephalic, atraumatic, PERRL  Respiratory: normal work of breathing  Abdomen: soft, non-tender, no guarding or rebound   MSK: no swelling or tenderness of lower extremities, moving all extremities spontaneously   Skin: warm, dry   Neuro: A&Ox3, cranial nerves II-XII intact, upper extremity tremors, 5/5 strength of upper extremities   Psych: appropriate affect

## 2022-03-13 NOTE — ED PROVIDER NOTE - CLINICAL SUMMARY MEDICAL DECISION MAKING FREE TEXT BOX
56M presenting with weakness. headache appears to be only acute symptoms. patient reports having 2-3 drinks a day which his brother confirms. possible alcohol withdrawal. chart review and brother confirms the patient frequently goes to ED's with similar complaints and often elopes. will get labs, CT, symptom control. will reassess.

## 2022-03-14 DIAGNOSIS — Z02.9 ENCOUNTER FOR ADMINISTRATIVE EXAMINATIONS, UNSPECIFIED: ICD-10-CM

## 2022-03-14 LAB
A1C WITH ESTIMATED AVERAGE GLUCOSE RESULT: 5.3 % — SIGNIFICANT CHANGE UP (ref 4–5.6)
ALBUMIN SERPL ELPH-MCNC: 3 G/DL — LOW (ref 3.5–5)
ALP SERPL-CCNC: 83 U/L — SIGNIFICANT CHANGE UP (ref 40–120)
ALT FLD-CCNC: 92 U/L DA — HIGH (ref 10–60)
AMPHET UR-MCNC: NEGATIVE — SIGNIFICANT CHANGE UP
ANION GAP SERPL CALC-SCNC: 6 MMOL/L — SIGNIFICANT CHANGE UP (ref 5–17)
AST SERPL-CCNC: 91 U/L — HIGH (ref 10–40)
BARBITURATES UR SCN-MCNC: NEGATIVE — SIGNIFICANT CHANGE UP
BASOPHILS # BLD AUTO: 0.04 K/UL — SIGNIFICANT CHANGE UP (ref 0–0.2)
BASOPHILS NFR BLD AUTO: 0.9 % — SIGNIFICANT CHANGE UP (ref 0–2)
BENZODIAZ UR-MCNC: NEGATIVE — SIGNIFICANT CHANGE UP
BILIRUB SERPL-MCNC: 1.7 MG/DL — HIGH (ref 0.2–1.2)
BUN SERPL-MCNC: 7 MG/DL — SIGNIFICANT CHANGE UP (ref 7–18)
CALCIUM SERPL-MCNC: 8.5 MG/DL — SIGNIFICANT CHANGE UP (ref 8.4–10.5)
CHLORIDE SERPL-SCNC: 106 MMOL/L — SIGNIFICANT CHANGE UP (ref 96–108)
CO2 SERPL-SCNC: 26 MMOL/L — SIGNIFICANT CHANGE UP (ref 22–31)
COCAINE METAB.OTHER UR-MCNC: NEGATIVE — SIGNIFICANT CHANGE UP
CREAT SERPL-MCNC: 0.78 MG/DL — SIGNIFICANT CHANGE UP (ref 0.5–1.3)
EGFR: 105 ML/MIN/1.73M2 — SIGNIFICANT CHANGE UP
EOSINOPHIL # BLD AUTO: 0.03 K/UL — SIGNIFICANT CHANGE UP (ref 0–0.5)
EOSINOPHIL NFR BLD AUTO: 0.7 % — SIGNIFICANT CHANGE UP (ref 0–6)
ESTIMATED AVERAGE GLUCOSE: 105 MG/DL — SIGNIFICANT CHANGE UP (ref 68–114)
FOLATE SERPL-MCNC: 18 NG/ML — SIGNIFICANT CHANGE UP
GLUCOSE BLDC GLUCOMTR-MCNC: 112 MG/DL — HIGH (ref 70–99)
GLUCOSE BLDC GLUCOMTR-MCNC: 123 MG/DL — HIGH (ref 70–99)
GLUCOSE BLDC GLUCOMTR-MCNC: 129 MG/DL — HIGH (ref 70–99)
GLUCOSE BLDC GLUCOMTR-MCNC: 134 MG/DL — HIGH (ref 70–99)
GLUCOSE SERPL-MCNC: 95 MG/DL — SIGNIFICANT CHANGE UP (ref 70–99)
HCT VFR BLD CALC: 37.3 % — LOW (ref 39–50)
HGB BLD-MCNC: 13.1 G/DL — SIGNIFICANT CHANGE UP (ref 13–17)
IMM GRANULOCYTES NFR BLD AUTO: 0.5 % — SIGNIFICANT CHANGE UP (ref 0–1.5)
LYMPHOCYTES # BLD AUTO: 0.99 K/UL — LOW (ref 1–3.3)
LYMPHOCYTES # BLD AUTO: 23.2 % — SIGNIFICANT CHANGE UP (ref 13–44)
MAGNESIUM SERPL-MCNC: 2.3 MG/DL — SIGNIFICANT CHANGE UP (ref 1.6–2.6)
MCHC RBC-ENTMCNC: 34.9 PG — HIGH (ref 27–34)
MCHC RBC-ENTMCNC: 35.1 GM/DL — SIGNIFICANT CHANGE UP (ref 32–36)
MCV RBC AUTO: 99.5 FL — SIGNIFICANT CHANGE UP (ref 80–100)
METHADONE UR-MCNC: NEGATIVE — SIGNIFICANT CHANGE UP
MONOCYTES # BLD AUTO: 0.51 K/UL — SIGNIFICANT CHANGE UP (ref 0–0.9)
MONOCYTES NFR BLD AUTO: 11.9 % — SIGNIFICANT CHANGE UP (ref 2–14)
NEUTROPHILS # BLD AUTO: 2.68 K/UL — SIGNIFICANT CHANGE UP (ref 1.8–7.4)
NEUTROPHILS NFR BLD AUTO: 62.8 % — SIGNIFICANT CHANGE UP (ref 43–77)
NRBC # BLD: 0 /100 WBCS — SIGNIFICANT CHANGE UP (ref 0–0)
OPIATES UR-MCNC: NEGATIVE — SIGNIFICANT CHANGE UP
PCP SPEC-MCNC: SIGNIFICANT CHANGE UP
PCP UR-MCNC: NEGATIVE — SIGNIFICANT CHANGE UP
PHOSPHATE SERPL-MCNC: 3.1 MG/DL — SIGNIFICANT CHANGE UP (ref 2.5–4.5)
PLATELET # BLD AUTO: 60 K/UL — LOW (ref 150–400)
POTASSIUM SERPL-MCNC: 3.6 MMOL/L — SIGNIFICANT CHANGE UP (ref 3.5–5.3)
POTASSIUM SERPL-SCNC: 3.6 MMOL/L — SIGNIFICANT CHANGE UP (ref 3.5–5.3)
PROT SERPL-MCNC: 6.3 G/DL — SIGNIFICANT CHANGE UP (ref 6–8.3)
RBC # BLD: 3.75 M/UL — LOW (ref 4.2–5.8)
RBC # FLD: 11.8 % — SIGNIFICANT CHANGE UP (ref 10.3–14.5)
SODIUM SERPL-SCNC: 138 MMOL/L — SIGNIFICANT CHANGE UP (ref 135–145)
THC UR QL: NEGATIVE — SIGNIFICANT CHANGE UP
TROPONIN I, HIGH SENSITIVITY RESULT: 15.3 NG/L — SIGNIFICANT CHANGE UP
VIT B12 SERPL-MCNC: 394 PG/ML — SIGNIFICANT CHANGE UP (ref 232–1245)
WBC # BLD: 4.27 K/UL — SIGNIFICANT CHANGE UP (ref 3.8–10.5)
WBC # FLD AUTO: 4.27 K/UL — SIGNIFICANT CHANGE UP (ref 3.8–10.5)

## 2022-03-14 PROCEDURE — 76705 ECHO EXAM OF ABDOMEN: CPT | Mod: 26,RT

## 2022-03-14 PROCEDURE — 99291 CRITICAL CARE FIRST HOUR: CPT

## 2022-03-14 PROCEDURE — 99233 SBSQ HOSP IP/OBS HIGH 50: CPT

## 2022-03-14 PROCEDURE — 72148 MRI LUMBAR SPINE W/O DYE: CPT | Mod: 26

## 2022-03-14 PROCEDURE — 99222 1ST HOSP IP/OBS MODERATE 55: CPT

## 2022-03-14 PROCEDURE — 72146 MRI CHEST SPINE W/O DYE: CPT | Mod: 26

## 2022-03-14 RX ORDER — DEXMEDETOMIDINE HYDROCHLORIDE IN 0.9% SODIUM CHLORIDE 4 UG/ML
0.2 INJECTION INTRAVENOUS
Qty: 400 | Refills: 0 | Status: DISCONTINUED | OUTPATIENT
Start: 2022-03-14 | End: 2022-03-15

## 2022-03-14 RX ORDER — SODIUM CHLORIDE 9 MG/ML
1000 INJECTION, SOLUTION INTRAVENOUS
Refills: 0 | Status: DISCONTINUED | OUTPATIENT
Start: 2022-03-14 | End: 2022-03-15

## 2022-03-14 RX ORDER — PANTOPRAZOLE SODIUM 20 MG/1
40 TABLET, DELAYED RELEASE ORAL
Refills: 0 | Status: DISCONTINUED | OUTPATIENT
Start: 2022-03-14 | End: 2022-03-28

## 2022-03-14 RX ORDER — INFLUENZA VIRUS VACCINE 15; 15; 15; 15 UG/.5ML; UG/.5ML; UG/.5ML; UG/.5ML
0.5 SUSPENSION INTRAMUSCULAR ONCE
Refills: 0 | Status: DISCONTINUED | OUTPATIENT
Start: 2022-03-14 | End: 2022-03-28

## 2022-03-14 RX ORDER — PHENOBARBITAL 60 MG
65 TABLET ORAL ONCE
Refills: 0 | Status: DISCONTINUED | OUTPATIENT
Start: 2022-03-14 | End: 2022-03-14

## 2022-03-14 RX ORDER — THIAMINE MONONITRATE (VIT B1) 100 MG
500 TABLET ORAL EVERY 8 HOURS
Refills: 0 | Status: COMPLETED | OUTPATIENT
Start: 2022-03-14 | End: 2022-03-17

## 2022-03-14 RX ORDER — CHLORHEXIDINE GLUCONATE 213 G/1000ML
1 SOLUTION TOPICAL
Refills: 0 | Status: DISCONTINUED | OUTPATIENT
Start: 2022-03-14 | End: 2022-03-16

## 2022-03-14 RX ORDER — PHENOBARBITAL 60 MG
130 TABLET ORAL ONCE
Refills: 0 | Status: DISCONTINUED | OUTPATIENT
Start: 2022-03-14 | End: 2022-03-14

## 2022-03-14 RX ORDER — DIPHENHYDRAMINE HCL 50 MG
25 CAPSULE ORAL ONCE
Refills: 0 | Status: COMPLETED | OUTPATIENT
Start: 2022-03-14 | End: 2022-03-14

## 2022-03-14 RX ADMIN — Medication 1 MILLIGRAM(S): at 11:30

## 2022-03-14 RX ADMIN — GABAPENTIN 100 MILLIGRAM(S): 400 CAPSULE ORAL at 15:41

## 2022-03-14 RX ADMIN — Medication 2 MILLIGRAM(S): at 12:42

## 2022-03-14 RX ADMIN — GABAPENTIN 100 MILLIGRAM(S): 400 CAPSULE ORAL at 05:13

## 2022-03-14 RX ADMIN — Medication 4 MILLIGRAM(S): at 21:28

## 2022-03-14 RX ADMIN — Medication 2 MILLIGRAM(S): at 18:01

## 2022-03-14 RX ADMIN — Medication 2 MILLIGRAM(S): at 21:06

## 2022-03-14 RX ADMIN — Medication 1 TABLET(S): at 11:52

## 2022-03-14 RX ADMIN — Medication 2 MILLIGRAM(S): at 16:04

## 2022-03-14 RX ADMIN — Medication 105 MILLIGRAM(S): at 12:01

## 2022-03-14 RX ADMIN — HEPARIN SODIUM 5000 UNIT(S): 5000 INJECTION INTRAVENOUS; SUBCUTANEOUS at 05:14

## 2022-03-14 RX ADMIN — Medication 25 MILLIGRAM(S): at 19:13

## 2022-03-14 RX ADMIN — Medication 2 MILLIGRAM(S): at 09:20

## 2022-03-14 RX ADMIN — HEPARIN SODIUM 5000 UNIT(S): 5000 INJECTION INTRAVENOUS; SUBCUTANEOUS at 15:41

## 2022-03-14 NOTE — PROGRESS NOTE ADULT - PROBLEM SELECTOR PLAN 2
- Patient has hx of alcoholic use   - Unknown how much he drinks  - Alcohol level of 23 on admission, urine drug screen negative  - continue CIWA protocol with IV ativan 2mg standing and PRN  - f/u social work for ETOH resources

## 2022-03-14 NOTE — CONSULT NOTE ADULT - ASSESSMENT
57yo male  57yo male ataxia 55yo male w/ ataxia most likely d/t Wernicke's Encephalopathy     - Encourage Thiamine replacement     - Maintain fall precautions     55yo male w/ ataxia and weakness most likely d/t Wernicke's Encephalopathy     - Encourage Thiamine replacement     - Maintain fall precautions     57yo male w/ ataxia and weakness most likely c/w Wernicke's Encephalopathy and peripheral neuropathy    - Encourage Thiamine replacement & alcohol abuse treatment    - Maintain fall precautions

## 2022-03-14 NOTE — CONSULT NOTE ADULT - SUBJECTIVE AND OBJECTIVE BOX
Patient is a 56y old  Male who presents with a chief complaint of Weakness / Ataxia / Possible alcohol intoxication (14 Mar 2022 17:37)      HPI:  Patient is 56 years old male with past medical history of chronic alcohol use was brought to ED by family member after he was found on floor in home today afternoon. during Examination, Patient was AAOx1-2 but could be due to Ativan he got in ED. Attempted to call Ck but didn't answer. So information was obtained from charts. Patient has been complaining of generalized weakness and he said "I didn't feel my legs". Patient reports that he fell many times in last month. Patient admits that he drinks alcohol occasionally and not sure when the last time he drunk. On Examination; Patient had sensory deficits but able to move his extremities. Patient is able to follow commands. Patient denied leg pain, recent trauma except falls, chest pain, abdominal pain, N/V or change in bowel movements.  (13 Mar 2022 20:55)      Hospital course:    patient admitted to medicine , CIWA protocol initiated and patient recived total of 12 mg atifan standing and PRn on floor, RRt was called for high CIWA 24, pt tachycardic , profusely sweating, hallucinating, tremolos,   4 additional mg of Ativan was given , patient transferred to ICU requiring close monitoring and Precedex gtt.            Allergies    No Known Allergies    Intolerances        MEDICATIONS  (STANDING):  chlorhexidine 2% Cloths 1 Application(s) Topical <User Schedule>  dexMEDEtomidine Infusion 0.2 MICROgram(s)/kG/Hr (4.43 mL/Hr) IV Continuous <Continuous>  folic acid 1 milliGRAM(s) Oral daily  gabapentin 100 milliGRAM(s) Oral three times a day  influenza   Vaccine 0.5 milliLiter(s) IntraMuscular once  insulin lispro (ADMELOG) corrective regimen sliding scale   SubCutaneous three times a day before meals  LORazepam   Injectable 2 milliGRAM(s) IV Push every 4 hours  LORazepam   Injectable 2 milliGRAM(s) IV Push once  multivitamin 1 Tablet(s) Oral daily  PHENobarbital Injectable 65 milliGRAM(s) IV Push once  thiamine IVPB 500 milliGRAM(s) IV Intermittent every 8 hours    MEDICATIONS  (PRN):  LORazepam   Injectable 2 milliGRAM(s) IV Push every 4 hours PRN Agitation      Daily     Daily     Drug Dosing Weight  Height (cm): 188 (13 Mar 2022 13:38)  Weight (kg): 88.5 (13 Mar 2022 21:55)  BMI (kg/m2): 25 (13 Mar 2022 21:55)  BSA (m2): 2.15 (13 Mar 2022 21:55)    PAST MEDICAL & SURGICAL HISTORY:  Alcohol abuse    H/O weakness        FAMILY HISTORY:      SOCIAL HISTORY:    ADVANCE DIRECTIVES:              ICU Vital Signs Last 24 Hrs  T(C): 36.3 (14 Mar 2022 22:33), Max: 37.8 (14 Mar 2022 18:04)  T(F): 97.4 (14 Mar 2022 22:33), Max: 100 (14 Mar 2022 18:04)  HR: 130 (14 Mar 2022 22:33) (86 - 130)  BP: 143/85 (14 Mar 2022 22:33) (138/78 - 165/82)  BP(mean): 99 (14 Mar 2022 13:21) (99 - 99)  ABP: --  ABP(mean): --  RR: 25 (14 Mar 2022 22:33) (17 - 25)  SpO2: 96% (14 Mar 2022 22:33) (95% - 100%)          I&O's Detail      PHYSICAL EXAM:    GENERAL: Agitated, sweating   HEAD:  Atraumatic, Normocephalic  EYES: EOMI, PERRLA, conjunctiva and sclera clear  ENMT: not examined   NECK: -----  NERVOUS SYSTEM:  Confused, jumping out of bed   CHEST/LUNG: Clear to percussion bilaterally  HEART: tachycardia   ABDOMEN: Soft, Nontender, Nondistended; Bowel sounds present  EXTREMITIES:  2+ Peripheral Pulses, No clubbing, cyanosis, or edema      LABS:  CBC Full  -  ( 14 Mar 2022 05:10 )  WBC Count : 4.27 K/uL  RBC Count : 3.75 M/uL  Hemoglobin : 13.1 g/dL  Hematocrit : 37.3 %  Platelet Count - Automated : 60 K/uL  Mean Cell Volume : 99.5 fl  Mean Cell Hemoglobin : 34.9 pg  Mean Cell Hemoglobin Concentration : 35.1 gm/dL  Auto Neutrophil # : 2.68 K/uL  Auto Lymphocyte # : 0.99 K/uL  Auto Monocyte # : 0.51 K/uL  Auto Eosinophil # : 0.03 K/uL  Auto Basophil # : 0.04 K/uL  Auto Neutrophil % : 62.8 %  Auto Lymphocyte % : 23.2 %  Auto Monocyte % : 11.9 %  Auto Eosinophil % : 0.7 %  Auto Basophil % : 0.9 %    03-14    138  |  106  |  7   ----------------------------<  95  3.6   |  26  |  0.78    Ca    8.5      14 Mar 2022 05:10  Phos  3.1     03-14  Mg     2.3     03-14    TPro  6.3  /  Alb  3.0<L>  /  TBili  1.7<H>  /  DBili  x   /  AST  91<H>  /  ALT  92<H>  /  AlkPhos  83  03-14    CAPILLARY BLOOD GLUCOSE      POCT Blood Glucose.: 123 mg/dL (14 Mar 2022 21:28)                EKG:    ECHO, US:    RADIOLOGY:    CRITICAL CARE TIME SPENT:

## 2022-03-14 NOTE — CONSULT NOTE ADULT - ATTENDING COMMENTS
Patient seen and examined. Data reviewed. Case and plan of care discussed with resident and agree with note.  This is 56 year old man with h/o chronic alcohol abuse admitted to medicine service for alcohol withdrawal and now transferred to ICU for delirium tremens (DT). Start phenobarbital, precedex infusion and IV ativan as needed. NPO, IV fluid hydration, monitor electrolytes and replete as needed.
decreased alexander/decreased weight-shifting ability/decreased velocity of limb motion/decreased step length

## 2022-03-14 NOTE — PATIENT PROFILE ADULT - FALL HARM RISK - HARM RISK INTERVENTIONS
Assistance with ambulation/Assistance OOB with selected safe patient handling equipment/Communicate Risk of Fall with Harm to all staff/Monitor for mental status changes/Monitor gait and stability/Reinforce activity limits and safety measures with patient and family/Tailored Fall Risk Interventions/Toileting schedule using arm’s reach rule for commode and bathroom/Use of alarms - bed, chair and/or voice tab/Visual Cue: Yellow wristband and red socks/Bed in lowest position, wheels locked, appropriate side rails in place/Call bell, personal items and telephone in reach/Instruct patient to call for assistance before getting out of bed or chair/Non-slip footwear when patient is out of bed/[place] to call system/Physically safe environment - no spills, clutter or unnecessary equipment/Purposeful Proactive Rounding/Room/bathroom lighting operational, light cord in reach

## 2022-03-14 NOTE — PROGRESS NOTE ADULT - PROBLEM SELECTOR PLAN 1
- Patient with hx of weakness was brought after he was found on floor.  - Patient is complaining of ambulatory dysfunction and sensory deficits from toes to umbilical level ( fine touch and pressure)  - CT head didn't show any acute deficits or hemorrhage.  - MRI thoracic and lumbar showed Moderate disc degeneration and spondylosis at T6-7 through T11-T12 as well as L3-4 through L5-S1 with loss of disc height and signal   within the nucleus pulposus. Disc bulges are noted at these levels which minimally flatten the ventral thecal sac disc herniation noted arachnoid   space.  - Neurology Dr. Tyler consulted, apprec. recs - Patient with hx of weakness was brought after he was found on floor.  - Patient is complaining of ambulatory dysfunction and sensory deficits from toes to umbilical level ( fine touch and pressure)  - CT head didn't show any acute deficits or hemorrhage.  - MRI thoracic and lumbar showed Moderate disc degeneration and spondylosis at T6-7 through T11-T12 as well as L3-4 through L5-S1 with loss of disc height and signal   within the nucleus pulposus. Disc bulges are noted at these levels which minimally flatten the ventral thecal sac disc herniation noted arachnoid   space.  - start thiamine 200 mg IV q8h  - Neurology Dr. Tyler consulted, apprec. recs - Patient with hx of weakness was brought after he was found on floor.  - Patient is complaining of ambulatory dysfunction and sensory deficits from toes to umbilical level ( fine touch and pressure)  - CT head didn't show any acute deficits or hemorrhage.  - MRI thoracic and lumbar showed Moderate disc degeneration and spondylosis at T6-7 through T11-T12 as well as L3-4 through L5-S1 with loss of disc height and signal   within the nucleus pulposus. Disc bulges are noted at these levels which minimally flatten the ventral thecal sac disc herniation noted arachnoid   space.  - start thiamine 500 mg IV daily  - continue folic acid 1 mg daily  - Neurology Dr. Tyler consulted, apprec. recs

## 2022-03-14 NOTE — PROGRESS NOTE ADULT - ASSESSMENT
55 y/o male with past medical history of chronic alcohol use was brought to ED by family member after he was found on floor in home today afternoon. Patient complaining of generalized weakness and he said "I didn't feel my legs". Patient reports that he fell many times in last month. Patient admits that he drinks alcohol occasionally and not sure when the last time he drunk. Pt was also found to have elevated liver enzymes and blood alcohol level of 23. Patient will be admitted to medicine for further management of alcohol withdrawal and generalized weakness, Neurology Dr. Tyler consulted.  CT head was negative for any acute abnormalities.   MRI thoracic and lumbar - Moderate disc degeneration and spondylosis at T6-7 through T11-T12 as well as L3-4 through L5-S1 with loss of disc height and signal   within the nucleus pulposus. Disc bulges are noted at these levels which minimally flatten the ventral thecal sac disc herniation noted arachnoid   space.  US RUQ- Hepatic steatosis. No cholelithiasis or biliary ductal dilatation.    Pt is alert, awake, appears anxious, +tremors. Denies any headache, nausea, vomiting or dizziness.

## 2022-03-14 NOTE — CONSULT NOTE ADULT - CONVERSATION DETAILS
Patient is FULL CODE Discussed with brother Patient is FULL CODE   - family notified: Brother Ck notified   - wife :Carolyn Dick : phone number [phone] , obtained from brother

## 2022-03-14 NOTE — CONSULT NOTE ADULT - ASSESSMENT
57yo M with PMH of chronic alcohol  p/w acute onset difficulty ambulating with generalized weakness. patient admitted to medicine , CIWA protocol initiated and patient received total of 12 mg Ativan, standing +PRN,  on floor, RRt was called for high CIWA to 24, pt tachycardic , Agitated , disoriented,  profusely sweating, hallucinating, tremolos,  4 additional mg of Ativan was given without improving in sx , patient transferred to ICU for severe alcohol withdrawal requiring close monitoring.    Of note : last drink unknown       Assessment:  Alcohol withdrawal   Moderate disc degeneration and spondylosis  Wernicke's Encephalopathy   Peripheral neuropathy         Plan  =====================[CNS ]==============================  # Delirium tremens , sever alcohol withdrawal   - s/p 16 mg Ativan over last 24 hours  - started on Precedex gtt, titrate down as tolerated  - pt on ativan standing 2q4 and 2q2 PRN   - will consider phenobarbital in case needed   - c/w CIWA protocol  - Monitor electrolytes and replace as needed     # Wernicke's Encephalopathy &Peripheral neuropathy  - c/w Thiamin replacement   - maintain fall precaution   - c/w Gabapentin   - MR Thoracic and lumbar spine: Moderate disc degeneration and spondylosis at T6-7 through   T11-T12 as well as L3-4 through L5-S1 with loss of disc height and signal within the nucleus pulposus. Disc bulges are noted at these levels which minimally flatten the ventral thecal sac disc herniation noted arachnoid space.        =====================[CVS ]===============================  # Tachycardia   - in setting of alcohol withdrawal   - c/w above mnetioned mng  - IVF therapy while NPO    =====================[RESP ]==============================  # No issues   - currently on RA     =====================[ GI ]================================  # Mild Alcohol Liver disease   - Monitor LFT  - avoid hepatotoxic meds    # NPO except meds fo now  - resume diet when mental status improves     ====================[ RENAL ]=============================   # No issues   - monitor BMP and electrolytes       =====================[ ID ]================================  # No issues     ===================[ HEME/ONC ]===========================  # Thrombocytopenia :   - likely 2/2 chronic alcohol abuse   - no active bleeding   - monitor CBC daily     =====================[ ENDO ]=============================  # Hyperglycemia   - A1c 5.4  - target CBG < 180  - currently well controlled on HSS     ================= Skin/Catheters============================  # No active issues   - No rashes.      ==================[ PROPHYLAXIS ]==========================   # Dvt : SCD boots  # Gi : Protonix     ====================[ DISPO ]==============================   - Monitor in ICU     ===================[ PROGNOSIS ]===========================  - Guarded   55yo M with PMH of chronic alcohol  p/w acute onset difficulty ambulating with generalized weakness. patient admitted to medicine , CIWA protocol initiated and patient received total of 12 mg Ativan, standing +PRN,  on floor, RRt was called for high CIWA to 24, pt tachycardic , Agitated , disoriented,  profusely sweating, hallucinating, tremolos,  4 additional mg of Ativan was given without improving in sx , patient transferred to ICU for severe alcohol withdrawal requiring close monitoring.    Of note : last drink unknown       Assessment:  Alcohol withdrawal   Moderate disc degeneration and spondylosis  Wernicke's Encephalopathy   Peripheral neuropathy         Plan  =====================[CNS ]==============================  # Delirium tremens , sever alcohol withdrawal   - s/p 16 mg Ativan over last 24 hours  - started on Precedex gtt, titrate down as tolerated  - pt on ativan standing 2q4 and 2q2 PRN   - will consider phenobarbital in case needed   - c/w CIWA protocol  - Monitor electrolytes and replace as needed     # Wernicke's Encephalopathy &Peripheral neuropathy  - c/w Thiamin replacement   - maintain fall precaution   - c/w Gabapentin   - MR Thoracic and lumbar spine: Moderate disc degeneration and spondylosis at T6-7 through   T11-T12 as well as L3-4 through L5-S1 with loss of disc height and signal within the nucleus pulposus. Disc bulges are noted at these levels which minimally flatten the ventral thecal sac disc herniation noted arachnoid space.        =====================[CVS ]===============================  # Tachycardia   - in setting of alcohol withdrawal   - c/w above mentioned  managemnet  - IVF therapy while NPO    =====================[RESP ]==============================  # No issues   - currently on RA     =====================[ GI ]================================  # Mild Alcohol Liver disease   - Monitor LFT  - avoid hepatotoxic meds    # NPO except meds fo now  - resume diet when mental status improves     ====================[ RENAL ]=============================   # No issues   - monitor BMP and electrolytes       =====================[ ID ]================================  # No issues     ===================[ HEME/ONC ]===========================  # Thrombocytopenia :   - likely 2/2 chronic alcohol abuse   - no active bleeding   - monitor CBC daily     =====================[ ENDO ]=============================  # Hyperglycemia   - A1c 5.4  - target CBG < 180  - currently well controlled on HSS     ================= Skin/Catheters============================  # No active issues   - No rashes.      ==================[ PROPHYLAXIS ]==========================   # Dvt : SCD boots  # Gi : Protonix     ====================[ DISPO ]==============================   - Monitor in ICU    - family notified: Brothjuice Stover notified   - wife :Carolyn Milligan Oats : phone number [phone]    ===================[ PROGNOSIS ]===========================  - Guarded

## 2022-03-14 NOTE — CONSULT NOTE ADULT - SUBJECTIVE AND OBJECTIVE BOX
Patient is a 56y old  Male who presents with a chief complaint of Weakness / Ataxia / Possible alcohol intoxication (13 Mar 2022 20:55)      HPI:  Patient is 56 years old male with past medical history of chronic alcohol use was brought to ED by family member after he was found on floor in home today afternoon. during Examination, Patient was AAOx1-2 but could be due to Ativan he got in ED. Attempted to call Ck but didn't answer. So information was obtained from charts. Patient has been complaining of generalized weakness and he said "I didn't feel my legs". Patient reports that he fell many times in last month. Patient admits that he drinks alcohol occasionally and not sure when the last time he drunk. On Examination; Patient had sensory deficits but able to move his extremities. Patient is able to follow commands. Patient denied leg pain, recent trauma except falls, chest pain, abdominal pain, N/V or change in bowel movements.  (13 Mar 2022 20:55)    Pt reports slurred speech since, Wed, 3/9 or Thur, 3/10.  Unable to provide coherent history.  Reports last walking Sat, 3/12.  Last drink Sat, 3/12.         Neurological Review of Systems:  No difficulty with language.  No vision loss or double vision.  No dizziness, vertigo or new hearing loss.  No difficulty with speech or swallowing.  No focal weakness.  No focal sensory changes.  No numbness or tingling in the bilateral lower extremities.  No difficulty with balance.  No difficulty with ambulation.        MEDICATIONS  (STANDING):  folic acid 1 milliGRAM(s) Oral daily  gabapentin 100 milliGRAM(s) Oral three times a day  heparin   Injectable 5000 Unit(s) SubCutaneous every 8 hours  influenza   Vaccine 0.5 milliLiter(s) IntraMuscular once  insulin lispro (ADMELOG) corrective regimen sliding scale   SubCutaneous three times a day before meals  LORazepam   Injectable 2 milliGRAM(s) IV Push every 6 hours  multivitamin 1 Tablet(s) Oral daily  thiamine IVPB 500 milliGRAM(s) IV Intermittent daily    MEDICATIONS  (PRN):  LORazepam   Injectable 2 milliGRAM(s) IV Push every 4 hours PRN CIWA-Ar score 8 or greater    Allergies    No Known Allergies    Intolerances      PAST MEDICAL & SURGICAL HISTORY:  Alcohol abuse    H/O weakness      FAMILY HISTORY:    SOCIAL HISTORY: non smoker/ former smoker/ active smoker    Review of Systems:  Constitutional: No generalized weakness. No fevers or chills.                    Eyes, Ears, Mouth, Throat: No vision loss   Respiratory: No shortness of breath or cough.                                Cardiovascular: No chest pain or palpitations  Gastrointestinal: No nausea or vomiting.                                         Genitourinary: No urinary incontinence or burning on urination.  Musculoskeletal: No joint pain.                                                           Dermatologic: No rash.  Neurological: as per HPI                                                                      Psychiatric: No behavioral problems.  Endocrine: No known hypoglycemia.               Hematologic/Lymphatic: No easy bleeding.    O:  Vital Signs Last 24 Hrs  T(C): 36.7 (13 Mar 2022 19:53), Max: 37.4 (13 Mar 2022 13:38)  T(F): 98.1 (13 Mar 2022 19:53), Max: 99.4 (13 Mar 2022 13:38)  HR: 100 (14 Mar 2022 12:56) (64 - 183)  BP: 148/85 (14 Mar 2022 12:56) (138/78 - 165/82)  BP(mean): --  RR: 18 (14 Mar 2022 12:56) (17 - 18)  SpO2: 95% (14 Mar 2022 09:32) (95% - 100%)    General Exam:   General appearance: No acute distress                 Cardiovascular: Pedal dorsalis pulses intact bilaterally    Mental Status: Oriented to self, date and place.  Attention intact.  No dysarthria, aphasia or neglect.  Knowledge intact.  Registration intact.  Short and long term memory grossly intact.      Cranial Nerves: CN I - not tested.  PERRL, EOMI, VFF, no nystagmus or diplopia.  No APD.  Fundi not visualized.  CN V1-3 intact to light touch and pinprick.  No facial asymmetry.  Hearing intact to finger rub bilaterally.  Tongue, uvula and palate midline.  Sternocleidomastoid and Trapezius intact bilaterally.    Motor:   Tone: normal.                  Strength intact throughout  No pronator drift bilaterally                      No dysmetria on finger-nose-finger or heel-shin-heel  No truncal ataxia.  No resting, postural or action tremor.  No myoclonus.    Sensation: intact to light touch, pinprick, vibration and proprioception    Deep Tendon Reflexes: 1+ bilateral biceps, triceps, brachioradialis, knee and ankle  Toes flexor bilaterally    Gait: normal and stable.  Romberg (-).    Other:     LABS:                        13.1   4.27  )-----------( 60       ( 14 Mar 2022 05:10 )             37.3     03-14    138  |  106  |  7   ----------------------------<  95  3.6   |  26  |  0.78    Ca    8.5      14 Mar 2022 05:10  Phos  3.1     03-14  Mg     2.3     03-14    TPro  6.3  /  Alb  3.0<L>  /  TBili  1.7<H>  /  DBili  x   /  AST  91<H>  /  ALT  92<H>  /  AlkPhos  83  03-14            RADIOLOGY & ADDITIONAL STUDIES:    EKG:  tele:  TTE:  EEG: +++++++++++++++++++NOTE INCOMPLETE++++++++++++++++++++++++    Patient is a 56y old  Male who presents with a chief complaint of Weakness / Ataxia / Possible alcohol intoxication (13 Mar 2022 20:55)      HPI:  Patient is 56 years old male with past medical history of chronic alcohol use was brought to ED by family member after he was found on floor in home today afternoon. during Examination, Patient was AAOx1-2 but could be due to Ativan he got in ED. Attempted to call Ck but didn't answer. So information was obtained from charts. Patient has been complaining of generalized weakness and he said "I didn't feel my legs". Patient reports that he fell many times in last month. Patient admits that he drinks alcohol occasionally and not sure when the last time he drunk. On Examination; Patient had sensory deficits but able to move his extremities. Patient is able to follow commands. Patient denied leg pain, recent trauma except falls, chest pain, abdominal pain, N/V or change in bowel movements.  (13 Mar 2022 20:55)    Pt reports slurred speech since, Wed, 3/9 or Thur, 3/10.  Unable to provide coherent history.  Reports last walking Sat, 3/12.  Last drink Sat, 3/12.         Neurological Review of Systems:  No difficulty with language.  No vision loss or double vision.  No dizziness, vertigo or new hearing loss.  No difficulty with speech or swallowing.  No focal weakness.  No focal sensory changes.  No numbness or tingling in the bilateral lower extremities.  No difficulty with balance.  No difficulty with ambulation.        MEDICATIONS  (STANDING):  folic acid 1 milliGRAM(s) Oral daily  gabapentin 100 milliGRAM(s) Oral three times a day  heparin   Injectable 5000 Unit(s) SubCutaneous every 8 hours  influenza   Vaccine 0.5 milliLiter(s) IntraMuscular once  insulin lispro (ADMELOG) corrective regimen sliding scale   SubCutaneous three times a day before meals  LORazepam   Injectable 2 milliGRAM(s) IV Push every 6 hours  multivitamin 1 Tablet(s) Oral daily  thiamine IVPB 500 milliGRAM(s) IV Intermittent daily    MEDICATIONS  (PRN):  LORazepam   Injectable 2 milliGRAM(s) IV Push every 4 hours PRN CIWA-Ar score 8 or greater    Allergies    No Known Allergies    Intolerances      PAST MEDICAL & SURGICAL HISTORY:  Alcohol abuse    H/O weakness      FAMILY HISTORY:    SOCIAL HISTORY: non smoker/ former smoker/ active smoker    Review of Systems:  Constitutional: No generalized weakness. No fevers or chills.                    Eyes, Ears, Mouth, Throat: No vision loss   Respiratory: No shortness of breath or cough.                                Cardiovascular: No chest pain or palpitations  Gastrointestinal: No nausea or vomiting.                                         Genitourinary: No urinary incontinence or burning on urination.  Musculoskeletal: No joint pain.                                                           Dermatologic: No rash.  Neurological: as per HPI                                                                      Psychiatric: No behavioral problems.  Endocrine: No known hypoglycemia.               Hematologic/Lymphatic: No easy bleeding.    O:  Vital Signs Last 24 Hrs  T(C): 36.7 (13 Mar 2022 19:53), Max: 37.4 (13 Mar 2022 13:38)  T(F): 98.1 (13 Mar 2022 19:53), Max: 99.4 (13 Mar 2022 13:38)  HR: 100 (14 Mar 2022 12:56) (64 - 183)  BP: 148/85 (14 Mar 2022 12:56) (138/78 - 165/82)  BP(mean): --  RR: 18 (14 Mar 2022 12:56) (17 - 18)  SpO2: 95% (14 Mar 2022 09:32) (95% - 100%)    General Exam:   General appearance: No acute distress                 Cardiovascular: Pedal dorsalis pulses intact bilaterally    Mental Status: Oriented to self, date and place.  Attention intact.  No dysarthria, aphasia or neglect.  Knowledge intact.  Registration intact.  Short and long term memory grossly intact.      Cranial Nerves: CN I - not tested.  PERRL, EOMI, VFF, no nystagmus or diplopia.  No APD.  Fundi not visualized.  CN V1-3 intact to light touch and pinprick.  No facial asymmetry.  Hearing intact to finger rub bilaterally.  Tongue, uvula and palate midline.  Sternocleidomastoid and Trapezius intact bilaterally.    Motor:   Tone: normal.                  Strength intact throughout  No pronator drift bilaterally                      No dysmetria on finger-nose-finger or heel-shin-heel  No truncal ataxia.  No resting, postural or action tremor.  No myoclonus.    Sensation: intact to light touch, pinprick, vibration and proprioception    Deep Tendon Reflexes: 1+ bilateral biceps, triceps, brachioradialis, knee and ankle  Toes flexor bilaterally    Gait: normal and stable.  Romberg (-).    Other:     LABS:                        13.1   4.27  )-----------( 60       ( 14 Mar 2022 05:10 )             37.3     03-14    138  |  106  |  7   ----------------------------<  95  3.6   |  26  |  0.78    Ca    8.5      14 Mar 2022 05:10  Phos  3.1     03-14  Mg     2.3     03-14    TPro  6.3  /  Alb  3.0<L>  /  TBili  1.7<H>  /  DBili  x   /  AST  91<H>  /  ALT  92<H>  /  AlkPhos  83  03-14            RADIOLOGY & ADDITIONAL STUDIES:    EKG:  tele:  TTE:  EEG: +++++++++++++++++++NOTE INCOMPLETE++++++++++++++++++++++++    Patient is a 56y old  Male who presents with a chief complaint of Weakness / Ataxia / Possible alcohol intoxication (13 Mar 2022 20:55)      HPI:  Patient is 56 years old male with past medical history of chronic alcohol use was brought to ED by family member after he was found on floor in home today afternoon. during Examination, Patient was AAOx1-2 but could be due to Ativan he got in ED. Attempted to call Ck but didn't answer. So information was obtained from charts. Patient has been complaining of generalized weakness and he said "I didn't feel my legs". Patient reports that he fell many times in last month. Patient admits that he drinks alcohol occasionally and not sure when the last time he drunk. On Examination; Patient had sensory deficits but able to move his extremities. Patient is able to follow commands. Patient denied leg pain, recent trauma except falls, chest pain, abdominal pain, N/V or change in bowel movements.  (13 Mar 2022 20:55)    Pt reports slurred speech since, Wed, 3/9 or Thur, 3/10.  Unable to provide coherent history.  Reports last walking Sat, 3/12.  Last drink Sat, 3/12.         Neurological Review of Systems:  (+) Difficulty with language.  No vision loss or double vision.  No dizziness, vertigo or new hearing loss.  (+) Difficulty with speech or swallowing.  No focal weakness.  (+) Focal sensory changes.  (+) numbness or tingling in the bilateral lower extremities.  (+) Difficulty with balance.  (+) Difficulty with ambulation.        MEDICATIONS  (STANDING):  folic acid 1 milliGRAM(s) Oral daily  gabapentin 100 milliGRAM(s) Oral three times a day  heparin   Injectable 5000 Unit(s) SubCutaneous every 8 hours  influenza   Vaccine 0.5 milliLiter(s) IntraMuscular once  insulin lispro (ADMELOG) corrective regimen sliding scale   SubCutaneous three times a day before meals  LORazepam   Injectable 2 milliGRAM(s) IV Push every 6 hours  multivitamin 1 Tablet(s) Oral daily  thiamine IVPB 500 milliGRAM(s) IV Intermittent daily    MEDICATIONS  (PRN):  LORazepam   Injectable 2 milliGRAM(s) IV Push every 4 hours PRN CIWA-Ar score 8 or greater    Allergies    No Known Allergies    Intolerances      PAST MEDICAL & SURGICAL HISTORY:  Alcohol abuse    H/O weakness      FAMILY HISTORY:    SOCIAL HISTORY: Non smoker    Review of Systems:  Constitutional: No generalized weakness. No fevers or chills.                    Eyes, Ears, Mouth, Throat: No vision loss   Respiratory: No shortness of breath or cough.                                Cardiovascular: No chest pain or palpitations  Gastrointestinal: No nausea or vomiting.                                         Genitourinary: No urinary incontinence or burning on urination.  Musculoskeletal: No joint pain.                                                           Dermatologic: No rash.  Neurological: as per HPI                                                                      Psychiatric: No behavioral problems.  Endocrine: No known hypoglycemia.               Hematologic/Lymphatic: No easy bleeding.    O:  Vital Signs Last 24 Hrs  T(C): 36.7 (13 Mar 2022 19:53), Max: 37.4 (13 Mar 2022 13:38)  T(F): 98.1 (13 Mar 2022 19:53), Max: 99.4 (13 Mar 2022 13:38)  HR: 100 (14 Mar 2022 12:56) (64 - 183)  BP: 148/85 (14 Mar 2022 12:56) (138/78 - 165/82)  RR: 18 (14 Mar 2022 12:56) (17 - 18)  SpO2: 95% (14 Mar 2022 09:32) (95% - 100%)    General Exam:   General appearance: No acute distress                 Cardiovascular: Pedal dorsalis pulses intact bilaterally    Mental Status: Oriented to self, date and place.  Attention intact.  (+) Dysarthria.  (+) Aphasia.  No neglect.  Knowledge intact.  Registration, short and long term memory impaired.      Cranial Nerves: CN I - not tested.  PERRL, EOMI, VFF, no nystagmus or diplopia.  No APD.  Fundi not visualized.  CN V1-3 intact to light touch.  No facial asymmetry.  Hearing intact to finger rub bilaterally.  Tongue, uvula and palate midline.  Sternocleidomastoid and Trapezius intact bilaterally.    Motor:   Tone: Normal                 Strength intact throughout  No pronator drift bilaterally                      No dysmetria on finger-nose-finger or heel-shin-heel  No truncal ataxia.  (+) Resting, postural & action tremor.  No myoclonus.    Sensation: Impaired to light touch    Deep Tendon Reflexes: 2+ bilateral biceps, triceps, brachioradialis.  2+ Right knee.  Unable to illicit left knee.  Mute bilateral ankles.  Toes mute bilaterally    Gait: Deferred at this time    Other:     LABS:                        13.1   4.27  )-----------( 60       ( 14 Mar 2022 05:10 )             37.3     03-14    138  |  106  |  7   ----------------------------<  95  3.6   |  26  |  0.78    Ca    8.5      14 Mar 2022 05:10  Phos  3.1     03-14  Mg     2.3     03-14    TPro  6.3  /  Alb  3.0<L>  /  TBili  1.7<H>  /  DBili  x   /  AST  91<H>  /  ALT  92<H>  /  AlkPhos  83  03-14            RADIOLOGY & ADDITIONAL STUDIES:    < from: CT Head No Cont (03.13.22 @ 14:35) >  IMPRESSION:    HEAD CT: No acute hemorrhage or midline shift.    --- End of Report ---            MICHAEL AGUIRRE MD; Attending Radiologist  This document has been electronically signed. Mar 13 2022  3:03PM    < end of copied text >   +++++++++++++++++++NOTE INCOMPLETE++++++++++++++++++++++++    Patient is a 56y old  Male who presents with a chief complaint of Weakness / Ataxia / Possible alcohol intoxication (13 Mar 2022 20:55)      HPI:  Patient is 56 years old male with past medical history of chronic alcohol use was brought to ED by family member after he was found on floor in home today afternoon. during Examination, Patient was AAOx1-2 but could be due to Ativan he got in ED. Attempted to call Ck but didn't answer. So information was obtained from charts. Patient has been complaining of generalized weakness and he said "I didn't feel my legs". Patient reports that he fell many times in last month. Patient admits that he drinks alcohol occasionally and not sure when the last time he drunk. On Examination; Patient had sensory deficits but able to move his extremities. Patient is able to follow commands. Patient denied leg pain, recent trauma except falls, chest pain, abdominal pain, N/V or change in bowel movements.  (13 Mar 2022 20:55)    Pt reports slurred speech since, Wed, 3/9 or Thur, 3/10.  Unable to provide coherent history.  Reports last walking Sat, 3/12.  Last drink Sat, 3/12.         Neurological Review of Systems:  (+) Difficulty with language.  No vision loss or double vision.  No dizziness, vertigo or new hearing loss.  (+) Difficulty with speech or swallowing.  No focal weakness.  (+) Focal sensory changes.  (+) numbness or tingling in the bilateral lower extremities.  (+) Difficulty with balance.  (+) Difficulty with ambulation.        MEDICATIONS  (STANDING):  folic acid 1 milliGRAM(s) Oral daily  gabapentin 100 milliGRAM(s) Oral three times a day  heparin   Injectable 5000 Unit(s) SubCutaneous every 8 hours  influenza   Vaccine 0.5 milliLiter(s) IntraMuscular once  insulin lispro (ADMELOG) corrective regimen sliding scale   SubCutaneous three times a day before meals  LORazepam   Injectable 2 milliGRAM(s) IV Push every 6 hours  multivitamin 1 Tablet(s) Oral daily  thiamine IVPB 500 milliGRAM(s) IV Intermittent daily    MEDICATIONS  (PRN):  LORazepam   Injectable 2 milliGRAM(s) IV Push every 4 hours PRN CIWA-Ar score 8 or greater    Allergies    No Known Allergies    Intolerances      PAST MEDICAL & SURGICAL HISTORY:  Alcohol abuse    H/O weakness      FAMILY HISTORY:    SOCIAL HISTORY: Non smoker    Review of Systems:  Constitutional: No generalized weakness. No fevers or chills.                    Eyes, Ears, Mouth, Throat: No vision loss   Respiratory: No shortness of breath or cough.                                Cardiovascular: No chest pain or palpitations  Gastrointestinal: No nausea or vomiting.                                         Genitourinary: No urinary incontinence or burning on urination.  Musculoskeletal: No joint pain.                                                           Dermatologic: No rash.  Neurological: as per HPI                                                                      Psychiatric: No behavioral problems.  Endocrine: No known hypoglycemia.               Hematologic/Lymphatic: No easy bleeding.    O:  Vital Signs Last 24 Hrs  T(C): 36.7 (13 Mar 2022 19:53), Max: 37.4 (13 Mar 2022 13:38)  T(F): 98.1 (13 Mar 2022 19:53), Max: 99.4 (13 Mar 2022 13:38)  HR: 100 (14 Mar 2022 12:56) (64 - 183)  BP: 148/85 (14 Mar 2022 12:56) (138/78 - 165/82)  RR: 18 (14 Mar 2022 12:56) (17 - 18)  SpO2: 95% (14 Mar 2022 09:32) (95% - 100%)    General Exam:   General appearance: No acute distress                 Cardiovascular: Pedal dorsalis pulses intact bilaterally    Mental Status: Oriented to self, date and place.  Attention intact.  (+) Dysarthria.  (+) Aphasia.  No neglect.  Knowledge intact.  Registration, short and long term memory impaired.      Cranial Nerves: CN I - not tested.  PERRL, EOMI, VFF, no nystagmus or diplopia.  No APD.  Fundi not visualized.  CN V1-3 intact to light touch.  No facial asymmetry.  Hearing intact to finger rub bilaterally.  Tongue, uvula and palate midline.  Sternocleidomastoid and Trapezius intact bilaterally.    Motor:   Tone: Normal                 Strength impaired in b/l LE, intact in all other extremities    No pronator drift bilaterally                      No dysmetria on finger-nose-finger or heel-shin-heel  No truncal ataxia.  (+) Resting, postural & action tremor.  No myoclonus.    Sensation: Impaired to light touch    Deep Tendon Reflexes: 2+ bilateral biceps, triceps, brachioradialis.  2+ Right knee.  Unable to illicit left knee.  Mute bilateral ankles.  Toes mute bilaterally    Gait: Deferred at this time    Other:     LABS:                        13.1   4.27  )-----------( 60       ( 14 Mar 2022 05:10 )             37.3     03-14    138  |  106  |  7   ----------------------------<  95  3.6   |  26  |  0.78    Ca    8.5      14 Mar 2022 05:10  Phos  3.1     03-14  Mg     2.3     03-14    TPro  6.3  /  Alb  3.0<L>  /  TBili  1.7<H>  /  DBili  x   /  AST  91<H>  /  ALT  92<H>  /  AlkPhos  83  03-14            RADIOLOGY & ADDITIONAL STUDIES:    < from: CT Head No Cont (03.13.22 @ 14:35) >  IMPRESSION:    HEAD CT: No acute hemorrhage or midline shift.    --- End of Report ---            MICHAEL AGUIRRE MD; Attending Radiologist  This document has been electronically signed. Mar 13 2022  3:03PM    < end of copied text >   +++++++++++++++++++NOTE INCOMPLETE++++++++++++++++++++++++    Patient is a 56y old  Male who presents with a chief complaint of Weakness / Ataxia / Possible alcohol intoxication (13 Mar 2022 20:55)      HPI:  Patient is 56 years old male with past medical history of chronic alcohol use was brought to ED by family member after he was found on floor in home today afternoon. during Examination, Patient was AAOx1-2 but could be due to Ativan he got in ED. Attempted to call Ck but didn't answer. So information was obtained from charts. Patient has been complaining of generalized weakness and he said "I didn't feel my legs". Patient reports that he fell many times in last month. Patient admits that he drinks alcohol occasionally and not sure when the last time he drunk. On Examination; Patient had sensory deficits but able to move his extremities. Patient is able to follow commands. Patient denied leg pain, recent trauma except falls, chest pain, abdominal pain, N/V or change in bowel movements.  (13 Mar 2022 20:55)    Pt reports slurred speech since, Wed, 3/9 or Thur, 3/10.  Unable to provide coherent history.  Reports last walking Sat, 3/12.  Last drink Sat, 3/12.         Neurological Review of Systems:  (+) Difficulty with language.  No vision loss or double vision.  No dizziness, vertigo or new hearing loss.  (+) Difficulty with speech or swallowing.  No focal weakness.  (+) Focal sensory changes.  (+) numbness or tingling in the bilateral lower extremities.  (+) Difficulty with balance.  (+) Difficulty with ambulation.        MEDICATIONS  (STANDING):  folic acid 1 milliGRAM(s) Oral daily  gabapentin 100 milliGRAM(s) Oral three times a day  heparin   Injectable 5000 Unit(s) SubCutaneous every 8 hours  influenza   Vaccine 0.5 milliLiter(s) IntraMuscular once  insulin lispro (ADMELOG) corrective regimen sliding scale   SubCutaneous three times a day before meals  LORazepam   Injectable 2 milliGRAM(s) IV Push every 6 hours  multivitamin 1 Tablet(s) Oral daily  thiamine IVPB 500 milliGRAM(s) IV Intermittent daily    MEDICATIONS  (PRN):  LORazepam   Injectable 2 milliGRAM(s) IV Push every 4 hours PRN CIWA-Ar score 8 or greater    Allergies    No Known Allergies    Intolerances      PAST MEDICAL & SURGICAL HISTORY:  Alcohol abuse    H/O weakness      FAMILY HISTORY:    SOCIAL HISTORY: Non smoker    Review of Systems:  Constitutional: No generalized weakness. No fevers or chills.                    Eyes, Ears, Mouth, Throat: No vision loss   Respiratory: No shortness of breath or cough.                                Cardiovascular: No chest pain or palpitations  Gastrointestinal: No nausea or vomiting.                                         Genitourinary: No urinary incontinence or burning on urination.  Musculoskeletal: No joint pain.                                                           Dermatologic: No rash.  Neurological: as per HPI                                                                      Psychiatric: No behavioral problems.  Endocrine: No known hypoglycemia.               Hematologic/Lymphatic: No easy bleeding.    O:  Vital Signs Last 24 Hrs  T(C): 36.7 (13 Mar 2022 19:53), Max: 37.4 (13 Mar 2022 13:38)  T(F): 98.1 (13 Mar 2022 19:53), Max: 99.4 (13 Mar 2022 13:38)  HR: 100 (14 Mar 2022 12:56) (64 - 183)  BP: 148/85 (14 Mar 2022 12:56) (138/78 - 165/82)  RR: 18 (14 Mar 2022 12:56) (17 - 18)  SpO2: 95% (14 Mar 2022 09:32) (95% - 100%)    General Exam:   General appearance: No acute distress                 Cardiovascular: Pedal dorsalis pulses intact bilaterally    Mental Status: Oriented to self, date and place.  Attention intact.  (+) Dysarthria.  (+) Aphasia.  No neglect.  Knowledge intact.  Registration, short and long term memory impaired.      Cranial Nerves: CN I - not tested.  PERRL, EOMI, VFF, no nystagmus or diplopia.  No APD.  Fundi not visualized.  CN V1-3 intact to light touch.  No facial asymmetry.  Hearing intact to finger rub bilaterally.  Tongue, uvula and palate midline.  Sternocleidomastoid and Trapezius intact bilaterally.    Motor:   Tone: Normal                 Strength impaired in b/l LE, intact in all other extremities    No pronator drift bilaterally                      No dysmetria on finger-nose-finger or heel-shin-heel  No truncal ataxia.  (+) Resting, postural & action tremor.  No myoclonus.    Sensation: Impaired to light touch    Deep Tendon Reflexes: 2+ bilateral biceps, triceps, brachioradialis.  2+ Right knee.  Unable to illicit left knee.  Mute bilateral ankles.  Toes mute bilaterally    Gait: Deferred at this time    Other:     LABS:                        13.1   4.27  )-----------( 60       ( 14 Mar 2022 05:10 )             37.3     03-14    138  |  106  |  7   ----------------------------<  95  3.6   |  26  |  0.78    Ca    8.5      14 Mar 2022 05:10  Phos  3.1     03-14  Mg     2.3     03-14    TPro  6.3  /  Alb  3.0<L>  /  TBili  1.7<H>  /  DBili  x   /  AST  91<H>  /  ALT  92<H>  /  AlkPhos  83  03-14            RADIOLOGY & ADDITIONAL STUDIES:  < from: MR Thoracic Spine No Cont (03.14.22 @ 14:04) >  IMPRESSION: Moderate disc degeneration and spondylosis at T6-7 through   T11-T12 as well as L3-4 through L5-S1 with loss of disc height and signal   within the nucleus pulposus. Disc bulges are noted at these levels which   minimally flatten the ventral thecal sac disc herniation noted arachnoid   space.      --- End of Report ---            KELL NGO MD; Attending Radiologist  This document has been electronically signed. Mar 14 2022  2:45PM    < end of copied text >      < from: CT Head No Cont (03.13.22 @ 14:35) >  IMPRESSION:    HEAD CT: No acute hemorrhage or midline shift.    --- End of Report ---            MICHAEL AGUIRRE MD; Attending Radiologist  This document has been electronically signed. Mar 13 2022  3:03PM    < end of copied text >   +++++++++++++++++++NOTE INCOMPLETE++++++++++++++++++++++++    Patient is a 56y old  Male who presents with a chief complaint of Weakness / Ataxia / Possible alcohol intoxication (13 Mar 2022 20:55)      HPI:  Patient is 56 years old male with past medical history of chronic alcohol use was brought to ED by family member after he was found on floor in home today afternoon. during Examination, Patient was AAOx1-2 but could be due to Ativan he got in ED. Attempted to call Ck but didn't answer. So information was obtained from charts. Patient has been complaining of generalized weakness and he said "I didn't feel my legs". Patient reports that he fell many times in last month. Patient admits that he drinks alcohol occasionally and not sure when the last time he drunk. On Examination; Patient had sensory deficits but able to move his extremities. Patient is able to follow commands. Patient denied leg pain, recent trauma except falls, chest pain, abdominal pain, N/V or change in bowel movements.  (13 Mar 2022 20:55)    Pt reports slurred speech since, Wed, 3/9 or Thur, 3/10.  Unable to provide coherent history.  Reports last walking Sat, 3/12.  Last drink Sat, 3/12.         Neurological Review of Systems:  (+) Difficulty with language.  No vision loss or double vision.  No dizziness, vertigo or new hearing loss.  (+) Difficulty with speech or swallowing.  No focal weakness.  (+) Focal sensory changes.  (+) numbness or tingling in the bilateral lower extremities.  (+) Difficulty with balance.  (+) Difficulty with ambulation.        MEDICATIONS  (STANDING):  folic acid 1 milliGRAM(s) Oral daily  gabapentin 100 milliGRAM(s) Oral three times a day  heparin   Injectable 5000 Unit(s) SubCutaneous every 8 hours  influenza   Vaccine 0.5 milliLiter(s) IntraMuscular once  insulin lispro (ADMELOG) corrective regimen sliding scale   SubCutaneous three times a day before meals  LORazepam   Injectable 2 milliGRAM(s) IV Push every 6 hours  multivitamin 1 Tablet(s) Oral daily  thiamine IVPB 500 milliGRAM(s) IV Intermittent daily    MEDICATIONS  (PRN):  LORazepam   Injectable 2 milliGRAM(s) IV Push every 4 hours PRN CIWA-Ar score 8 or greater    Allergies    No Known Allergies    Intolerances      PAST MEDICAL & SURGICAL HISTORY:  Alcohol abuse    H/O weakness      FAMILY HISTORY:    SOCIAL HISTORY: Non smoker    Review of Systems:  Constitutional: No generalized weakness. No fevers or chills.                    Eyes, Ears, Mouth, Throat: No vision loss   Respiratory: No shortness of breath or cough.                                Cardiovascular: No chest pain or palpitations  Gastrointestinal: No nausea or vomiting.                                         Genitourinary: No urinary incontinence or burning on urination.  Musculoskeletal: No joint pain.                                                           Dermatologic: No rash.  Neurological: as per HPI                                                                      Psychiatric: No behavioral problems.  Endocrine: No known hypoglycemia.               Hematologic/Lymphatic: No easy bleeding.    O:  Vital Signs Last 24 Hrs  T(C): 36.7 (13 Mar 2022 19:53), Max: 37.4 (13 Mar 2022 13:38)  T(F): 98.1 (13 Mar 2022 19:53), Max: 99.4 (13 Mar 2022 13:38)  HR: 100 (14 Mar 2022 12:56) (64 - 183)  BP: 148/85 (14 Mar 2022 12:56) (138/78 - 165/82)  RR: 18 (14 Mar 2022 12:56) (17 - 18)  SpO2: 95% (14 Mar 2022 09:32) (95% - 100%)    General Exam:   General appearance: No acute distress                 Cardiovascular: Pedal dorsalis pulses intact bilaterally    Mental Status: Oriented to self, date and place.  Attention intact.  (+) Dysarthria.  (+) Aphasia.  No neglect.  Knowledge intact.  Registration, short and long term memory impaired.      Cranial Nerves: CN I - not tested.  PERRL, EOMI, VFF, no nystagmus or diplopia.  No APD.  Fundi not visualized.  CN V1-3 intact to light touch.  No facial asymmetry.  Hearing intact to finger rub bilaterally.  Tongue, uvula and palate midline.  Sternocleidomastoid and Trapezius intact bilaterally.    Motor:   Tone: Normal                 Strength impaired in b/l LE, intact in all other extremities    No pronator drift bilaterally                      No dysmetria on finger-nose-finger.  Unable to perform heel-shin-heel d/t tremors.    No truncal ataxia.  (+) Resting, postural & action tremor.  No myoclonus.    Sensation: Impaired to light touch    Deep Tendon Reflexes: 2+ bilateral biceps, triceps, brachioradialis.  2+ Right knee.  Unable to illicit left knee.  Mute bilateral ankles.  Toes mute bilaterally    Gait: Deferred at this time    Other:     LABS:                        13.1   4.27  )-----------( 60       ( 14 Mar 2022 05:10 )             37.3     03-14    138  |  106  |  7   ----------------------------<  95  3.6   |  26  |  0.78    Ca    8.5      14 Mar 2022 05:10  Phos  3.1     03-14  Mg     2.3     03-14    TPro  6.3  /  Alb  3.0<L>  /  TBili  1.7<H>  /  DBili  x   /  AST  91<H>  /  ALT  92<H>  /  AlkPhos  83  03-14            RADIOLOGY & ADDITIONAL STUDIES:  < from: MR Thoracic Spine No Cont (03.14.22 @ 14:04) >  IMPRESSION: Moderate disc degeneration and spondylosis at T6-7 through   T11-T12 as well as L3-4 through L5-S1 with loss of disc height and signal   within the nucleus pulposus. Disc bulges are noted at these levels which   minimally flatten the ventral thecal sac disc herniation noted arachnoid   space.      --- End of Report ---            KELL NGO MD; Attending Radiologist  This document has been electronically signed. Mar 14 2022  2:45PM    < end of copied text >      < from: CT Head No Cont (03.13.22 @ 14:35) >  IMPRESSION:    HEAD CT: No acute hemorrhage or midline shift.    --- End of Report ---            MICHAEL AGUIRRE MD; Attending Radiologist  This document has been electronically signed. Mar 13 2022  3:03PM    < end of copied text >   Patient is a 56y old  Male who presents with a chief complaint of Weakness / Ataxia / Possible alcohol intoxication (13 Mar 2022 20:55)      HPI:  Patient is 56 years old male with past medical history of chronic alcohol use was brought to ED by family member after he was found on floor in home today afternoon. during Examination, Patient was AAOx1-2 but could be due to Ativan he got in ED. Attempted to call Ck but didn't answer. So information was obtained from charts. Patient has been complaining of generalized weakness and he said "I didn't feel my legs". Patient reports that he fell many times in last month. Patient admits that he drinks alcohol occasionally and not sure when the last time he drunk. On Examination; Patient had sensory deficits but able to move his extremities. Patient is able to follow commands. Patient denied leg pain, recent trauma except falls, chest pain, abdominal pain, N/V or change in bowel movements.  (13 Mar 2022 20:55)    Pt reports slurred speech since, Wed, 3/9 or Thur, 3/10.  Unable to provide coherent history.  Reports last walking Sat, 3/12.  Last drink Sat, 3/12.         Neurological Review of Systems:  (+) Difficulty with language.  No vision loss or double vision.  No dizziness, vertigo or new hearing loss.  (+) Difficulty with speech or swallowing.  No focal weakness.  (+) Focal sensory changes.  (+) numbness or tingling in the bilateral lower extremities.  (+) Difficulty with balance.  (+) Difficulty with ambulation.        MEDICATIONS  (STANDING):  folic acid 1 milliGRAM(s) Oral daily  gabapentin 100 milliGRAM(s) Oral three times a day  heparin   Injectable 5000 Unit(s) SubCutaneous every 8 hours  influenza   Vaccine 0.5 milliLiter(s) IntraMuscular once  insulin lispro (ADMELOG) corrective regimen sliding scale   SubCutaneous three times a day before meals  LORazepam   Injectable 2 milliGRAM(s) IV Push every 6 hours  multivitamin 1 Tablet(s) Oral daily  thiamine IVPB 500 milliGRAM(s) IV Intermittent daily    MEDICATIONS  (PRN):  LORazepam   Injectable 2 milliGRAM(s) IV Push every 4 hours PRN CIWA-Ar score 8 or greater    Allergies    No Known Allergies    Intolerances      PAST MEDICAL & SURGICAL HISTORY:  Alcohol abuse    H/O weakness      FAMILY HISTORY:    SOCIAL HISTORY: Non smoker    Review of Systems:  Constitutional: No generalized weakness. No fevers or chills.                    Eyes, Ears, Mouth, Throat: No vision loss   Respiratory: No shortness of breath or cough.                                Cardiovascular: No chest pain or palpitations  Gastrointestinal: No nausea or vomiting.                                         Genitourinary: No urinary incontinence or burning on urination.  Musculoskeletal: No joint pain.                                                           Dermatologic: No rash.  Neurological: as per HPI                                                                      Psychiatric: No behavioral problems.  Endocrine: No known hypoglycemia.               Hematologic/Lymphatic: No easy bleeding.    O:  Vital Signs Last 24 Hrs  T(C): 36.7 (13 Mar 2022 19:53), Max: 37.4 (13 Mar 2022 13:38)  T(F): 98.1 (13 Mar 2022 19:53), Max: 99.4 (13 Mar 2022 13:38)  HR: 100 (14 Mar 2022 12:56) (64 - 183)  BP: 148/85 (14 Mar 2022 12:56) (138/78 - 165/82)  RR: 18 (14 Mar 2022 12:56) (17 - 18)  SpO2: 95% (14 Mar 2022 09:32) (95% - 100%)    General Exam:   General appearance: No acute distress                 Cardiovascular: Pedal dorsalis pulses intact bilaterally    Mental Status: Oriented to self, date and place.  Attention intact.  (+) Dysarthria.  (+) Aphasia.  No neglect.  Knowledge intact.  Registration, short and long term memory impaired.      Cranial Nerves: CN I - not tested.  PERRL, EOMI, VFF, no nystagmus or diplopia.  No APD.  Fundi not visualized.  CN V1-3 intact to light touch.  No facial asymmetry.  Hearing intact to finger rub bilaterally.  Tongue, uvula and palate midline.  Sternocleidomastoid and Trapezius intact bilaterally.    Motor:   Tone: Normal                 Strength impaired in b/l LE, intact in all other extremities    No pronator drift bilaterally                      No dysmetria on finger-nose-finger.  Unable to perform heel-shin-heel d/t tremors.    No truncal ataxia.  (+) Resting, postural & action tremor.  No myoclonus.    Sensation: Impaired to light touch    Deep Tendon Reflexes: 2+ bilateral biceps, triceps, brachioradialis.  2+ Right knee.  Unable to illicit left knee.  Mute bilateral ankles.  Toes mute bilaterally    Gait: Deferred at this time    Other:     LABS:                        13.1   4.27  )-----------( 60       ( 14 Mar 2022 05:10 )             37.3     03-14    138  |  106  |  7   ----------------------------<  95  3.6   |  26  |  0.78    Ca    8.5      14 Mar 2022 05:10  Phos  3.1     03-14  Mg     2.3     03-14    TPro  6.3  /  Alb  3.0<L>  /  TBili  1.7<H>  /  DBili  x   /  AST  91<H>  /  ALT  92<H>  /  AlkPhos  83  03-14            RADIOLOGY & ADDITIONAL STUDIES:  < from: MR Thoracic Spine No Cont (03.14.22 @ 14:04) >  IMPRESSION: Moderate disc degeneration and spondylosis at T6-7 through   T11-T12 as well as L3-4 through L5-S1 with loss of disc height and signal   within the nucleus pulposus. Disc bulges are noted at these levels which   minimally flatten the ventral thecal sac disc herniation noted arachnoid   space.      --- End of Report ---            KELL NGO MD; Attending Radiologist  This document has been electronically signed. Mar 14 2022  2:45PM    < end of copied text >      < from: CT Head No Cont (03.13.22 @ 14:35) >  IMPRESSION:    HEAD CT: No acute hemorrhage or midline shift.    --- End of Report ---            MICHAEL AGUIRRE MD; Attending Radiologist  This document has been electronically signed. Mar 13 2022  3:03PM    < end of copied text >   Patient is a 56y old  Male who presents with a chief complaint of Weakness / Ataxia / Possible alcohol intoxication (13 Mar 2022 20:55)      HPI:  Patient is 56 years old male with past medical history of chronic alcohol use was brought to ED by family member after he was found on floor in home today afternoon. during Examination, Patient was AAOx1-2 but could be due to Ativan he got in ED. Attempted to call Ck but didn't answer. So information was obtained from charts. Patient has been complaining of generalized weakness and he said "I didn't feel my legs". Patient reports that he fell many times in last month. Patient admits that he drinks alcohol occasionally and not sure when the last time he drunk. On Examination; Patient had sensory deficits but able to move his extremities. Patient is able to follow commands. Patient denied leg pain, recent trauma except falls, chest pain, abdominal pain, N/V or change in bowel movements.  (13 Mar 2022 20:55)    Pt reports slurred speech since, Wed, 3/9 or Thur, 3/10.  Unable to provide coherent history.  Reports last walking Sat, 3/12.  Last drink Sat, 3/12.         Neurological Review of Systems:  (+) Difficulty with language.  No vision loss or double vision.  No dizziness, vertigo or new hearing loss.  (+) Difficulty with speech or swallowing.  No focal weakness.  (+) Focal sensory changes.  (+) numbness or tingling in the bilateral lower extremities.  (+) Difficulty with balance.  (+) Difficulty with ambulation.        MEDICATIONS  (STANDING):  folic acid 1 milliGRAM(s) Oral daily  gabapentin 100 milliGRAM(s) Oral three times a day  heparin   Injectable 5000 Unit(s) SubCutaneous every 8 hours  influenza   Vaccine 0.5 milliLiter(s) IntraMuscular once  insulin lispro (ADMELOG) corrective regimen sliding scale   SubCutaneous three times a day before meals  LORazepam   Injectable 2 milliGRAM(s) IV Push every 6 hours  multivitamin 1 Tablet(s) Oral daily  thiamine IVPB 500 milliGRAM(s) IV Intermittent daily    MEDICATIONS  (PRN):  LORazepam   Injectable 2 milliGRAM(s) IV Push every 4 hours PRN CIWA-Ar score 8 or greater    Allergies    No Known Allergies    Intolerances      PAST MEDICAL & SURGICAL HISTORY:  Alcohol abuse    H/O weakness      FAMILY HISTORY:    SOCIAL HISTORY: Non smoker    Review of Systems:  Constitutional: No generalized weakness. No fevers or chills.                    Eyes, Ears, Mouth, Throat: No vision loss   Respiratory: No shortness of breath or cough.                                Cardiovascular: No chest pain or palpitations  Gastrointestinal: No nausea or vomiting.                                         Genitourinary: No urinary incontinence or burning on urination.  Musculoskeletal: No joint pain.                                                           Dermatologic: No rash.  Neurological: as per HPI                                                                      Psychiatric: No behavioral problems.  Endocrine: No known hypoglycemia.               Hematologic/Lymphatic: No easy bleeding.    O:  Vital Signs Last 24 Hrs  T(C): 36.7 (13 Mar 2022 19:53), Max: 37.4 (13 Mar 2022 13:38)  T(F): 98.1 (13 Mar 2022 19:53), Max: 99.4 (13 Mar 2022 13:38)  HR: 100 (14 Mar 2022 12:56) (64 - 183)  BP: 148/85 (14 Mar 2022 12:56) (138/78 - 165/82)  RR: 18 (14 Mar 2022 12:56) (17 - 18)  SpO2: 95% (14 Mar 2022 09:32) (95% - 100%)    General Exam:   General appearance: No acute distress                 Cardiovascular: Pedal dorsalis pulses intact bilaterally    Mental Status: Oriented to self, date and place.  Attention intact.  (+) Dysarthria.  (+) Aphasia.  No neglect.  Knowledge intact.  Registration, short and long term memory impaired.      Cranial Nerves: CN I - not tested.  PERRL, EOMI, VFF, no nystagmus or diplopia.  No APD.  Fundi not visualized.  CN V1-3 intact to light touch.  No facial asymmetry.  Hearing intact to finger rub bilaterally.  Tongue, uvula and palate midline.  Sternocleidomastoid and Trapezius intact bilaterally.    Motor:   Tone: Normal                 Strength impaired in b/l LE, intact in all other extremities    No pronator drift bilaterally                      No dysmetria on finger-nose-finger.  Unable to perform heel-shin-heel d/t tremors.    No truncal ataxia.  (+) Resting, postural & action tremor.  No myoclonus.    Sensation: Impaired to light touch, pp and vibration in ankles and legs; no truncal sensory level    Deep Tendon Reflexes: 2+ bilateral biceps, triceps, brachioradialis.  2+ Right knee.  Unable to illicit left knee.  0 bilateral ankles.  Plantar reflex no response    Gait: Deferred at this time    Other:     LABS:                        13.1   4.27  )-----------( 60       ( 14 Mar 2022 05:10 )             37.3     03-14    138  |  106  |  7   ----------------------------<  95  3.6   |  26  |  0.78    Ca    8.5      14 Mar 2022 05:10  Phos  3.1     03-14  Mg     2.3     03-14    TPro  6.3  /  Alb  3.0<L>  /  TBili  1.7<H>  /  DBili  x   /  AST  91<H>  /  ALT  92<H>  /  AlkPhos  83  03-14            RADIOLOGY & ADDITIONAL STUDIES:  < from: MR Thoracic Spine No Cont (03.14.22 @ 14:04) >  IMPRESSION: Moderate disc degeneration and spondylosis at T6-7 through   T11-T12 as well as L3-4 through L5-S1 with loss of disc height and signal   within the nucleus pulposus. Disc bulges are noted at these levels which   minimally flatten the ventral thecal sac disc herniation noted arachnoid   space.      --- End of Report ---            KELL NGO MD; Attending Radiologist  This document has been electronically signed. Mar 14 2022  2:45PM    < end of copied text >      < from: CT Head No Cont (03.13.22 @ 14:35) >  IMPRESSION:    HEAD CT: No acute hemorrhage or midline shift.    --- End of Report ---            MICHAEL AGUIRRE MD; Attending Radiologist  This document has been electronically signed. Mar 13 2022  3:03PM    < end of copied text >

## 2022-03-14 NOTE — PROGRESS NOTE ADULT - SUBJECTIVE AND OBJECTIVE BOX
Patient is a 56y old  Male who presents with a chief complaint of Weakness / Ataxia / Possible alcohol intoxication (14 Mar 2022 13:04)    OVERNIGHT EVENTS: no acute changes    REVIEW OF SYSTEMS:  CONSTITUTIONAL: No fever, chills  NECK: No pain or stiffness  RESPIRATORY: No cough, SOB  CARDIOVASCULAR: No chest pain, palpitations  GASTROINTESTINAL: No abdominal pain. No nausea, vomiting, or diarrhea  GENITOURINARY: No dysuria  NEUROLOGICAL: No HA  SKIN: No itching, burning, rashes, or lesions   MUSCULOSKELETAL: No joint pain or swelling; No muscle, back, or extremity pain    T(C): 36.9 (03-14-22 @ 16:40), Max: 37 (03-14-22 @ 13:21)  HR: 102 (03-14-22 @ 16:40) (86 - 183)  BP: 153/85 (03-14-22 @ 16:40) (138/78 - 165/82)  RR: 18 (03-14-22 @ 16:40) (17 - 18)  SpO2: 98% (03-14-22 @ 16:40) (95% - 100%)  Wt(kg): --Vital Signs Last 24 Hrs  T(C): 36.9 (14 Mar 2022 16:40), Max: 37 (14 Mar 2022 13:21)  T(F): 98.5 (14 Mar 2022 16:40), Max: 98.6 (14 Mar 2022 13:21)  HR: 102 (14 Mar 2022 16:40) (86 - 183)  BP: 153/85 (14 Mar 2022 16:40) (138/78 - 165/82)  BP(mean): 99 (14 Mar 2022 13:21) (99 - 99)  RR: 18 (14 Mar 2022 16:40) (17 - 18)  SpO2: 98% (14 Mar 2022 16:40) (95% - 100%)    MEDICATIONS  (STANDING):  folic acid 1 milliGRAM(s) Oral daily  gabapentin 100 milliGRAM(s) Oral three times a day  heparin   Injectable 5000 Unit(s) SubCutaneous every 8 hours  influenza   Vaccine 0.5 milliLiter(s) IntraMuscular once  insulin lispro (ADMELOG) corrective regimen sliding scale   SubCutaneous three times a day before meals  LORazepam   Injectable 2 milliGRAM(s) IV Push every 6 hours  multivitamin 1 Tablet(s) Oral daily  thiamine IVPB 500 milliGRAM(s) IV Intermittent daily    MEDICATIONS  (PRN):  LORazepam   Injectable 2 milliGRAM(s) IV Push every 4 hours PRN CIWA-Ar score 8 or greater    PHYSICAL EXAM:  GENERAL: anxious appearing, NAD  EYES: clear conjunctiva  ENMT: Moist mucous membranes  NECK: Supple, No JVD  CHEST/LUNG: Clear to auscultation bilaterally; No rales, rhonchi, wheezing, or rubs  HEART: S1, S2, Regular rate and rhythm  ABDOMEN: Soft, Nontender, Nondistended; Bowel sounds present  NEURO: Alert & Oriented X3  EXTREMITIES: +tremors bilateral hands. No LE edema, no calf tenderness  SKIN: No rashes or lesions    Consultant(s) Notes Reviewed:  [x ] YES  [ ] NO  Care Discussed with Consultants/Other Providers [ x] YES  [ ] NO    LABS:                        13.1   4.27  )-----------( 60       ( 14 Mar 2022 05:10 )             37.3     03-14    138  |  106  |  7   ----------------------------<  95  3.6   |  26  |  0.78    Ca    8.5      14 Mar 2022 05:10  Phos  3.1     03-14  Mg     2.3     03-14    TPro  6.3  /  Alb  3.0<L>  /  TBili  1.7<H>  /  DBili  x   /  AST  91<H>  /  ALT  92<H>  /  AlkPhos  83  03-14      CAPILLARY BLOOD GLUCOSE      POCT Blood Glucose.: 134 mg/dL (14 Mar 2022 16:23)  POCT Blood Glucose.: 112 mg/dL (14 Mar 2022 11:31)  POCT Blood Glucose.: 129 mg/dL (14 Mar 2022 08:29)            RADIOLOGY & ADDITIONAL TESTS:  < from: MR Thoracic Spine No Cont (03.14.22 @ 14:04) >    ACC: [id number] EXAM:  MR SPINE LUMBAR                        ACC: [id number] EXAM:  MR SPINE THORACIC                          PROCEDURE DATE:  03/14/2022          INTERPRETATION:  MR thoracic and lumbar without gadolinium    CLINICAL INDICATION: Lower extremity weakness and decreased sensation    TECHNIQUE:   Sagittal  and axial T1-weighted images, sagittal STIR   images,  and sagittal and axial T2-weighted images of the thoracic spine   were obtained.    FINDINGS:   No prior similar studies are available for review.    Thoracic and lumbar vertebral alignment is significant for a slightly   exaggerated thoracic kyphosis.  Thoracic and lumbar vertebral body   heights are maintained.  Marrow signal intensity within thoracic and   lumbar vertebral bodies and posterior elements is unremarkable.  No   osseous expansion, epidural disease or paraspinal abnormality is found.    Thoracic and lumbar intervertebral discs show disc degeneration and   spondylosis at T6-7 through T11-T12 as well as L3-4 through L5-S1 with   loss of disc height and signal within the nucleus pulposus. Disc bulges   are noted at these levels which minimally flatten the ventral thecal sac   disc herniation noted arachnoid space.    The thoracic cord maintains intact morphology.  Thoracic cord signal   intensity is intact. The conus medullaris terminates at T12.      IMPRESSION: Moderate disc degeneration and spondylosis at T6-7 through   T11-T12 as well as L3-4 through L5-S1 with loss of disc height and signal   within the nucleus pulposus. Disc bulges are noted at these levels which   minimally flatten the ventral thecal sac disc herniation noted arachnoid   space.      --- End of Report ---            KELL NGO MD; Attending Radiologist  This document has been electronically signed. Mar 14 2022  2:45PM    < end of copied text >  < from: US Abdomen Upper Quadrant Right (03.14.22 @ 15:40) >  ACC: [id number] EXAM:  US ABDOMEN RT UPR QUADRANT                          PROCEDURE DATE:  03/14/2022          INTERPRETATION:  CLINICAL INFORMATION: Elevated liver enzymes    COMPARISON: None available.    TECHNIQUE: Sonography of the right upper quadrant.    FINDINGS:    Liver: Increased echogenicity. Enlarged (17.9 cm). Patent main portal   vein with normal flow direction.  Bile ducts: Normal caliber. Common bile duct measures 4.6 mm.  Gallbladder: Within normal limits.  Pancreas: Visualized portions are within normal limits.  Right kidney: 10.8 cm. No hydronephrosis.  Ascites: None.  IVC: Visualized portions are within normal limits.    IMPRESSION:    Hepatic steatosis.  No cholelithiasis or biliary ductal dilatation.        --- End of Report ---            ELIE BAILEY MD; Attending Radiologist  This document has been electronically signed. Mar 14 2022  3:41PM    < end of copied text >  < from: Xray Chest 1 View-PORTABLE IMMEDIATE (Xray Chest 1 View-PORTABLE IMMEDIATE .) (03.13.22 @ 21:06) >    ACC: [id number] EXAM:  XR CHEST PORTABLE IMMED 1V                          PROCEDURE DATE:  03/13/2022          INTERPRETATION:  Unsteady gait.    AP chest.    Normal heart mediastinum. No consolidation or effusion. Elevation right   hemidiaphragm.    IMPRESSION: No active infiltrates.    --- End of Report ---            ARMAND DELGADO MD; Attending Radiologist  This document has been electronically signed. Mar 14 2022  9:47AM    < end of copied text >  < from: CT Head No Cont (03.13.22 @ 14:35) >  ACC: [id number] EXAM:  CT BRAIN                          PROCEDURE DATE:  03/13/2022          INTERPRETATION:  CT HEAD  HEAD CT    INDICATIONS: headache and leg numbness, brother called 911 because he   found the patient on the floor and the patient was having trouble moving.   reports headache that began approximately 1 hour prior to arrival. no   changes in vision. has had ongoing numbness in his legs for months, as   well as chest pain and shortness of breath. reports he last drink alcohol   last night, 2 glasses of wine. patient has been complaining of similar   symptoms for several months.    TECHNIQUE:    HEAD CT:  Serial axial images were obtained from the skull base to the vertex   without the use of intravenous contrast.    COMPARISON EXAMINATION: None.    FINDINGS:    HEAD CT:    VENTRICLES AND SULCI: Ventricles and sulci are unremarkable for patient   age.  INTRA-AXIAL: No intracranial mass, acute hemorrhage, or midline shift is   present. There is non-specific decreased attenuation in the white matter   likely related to sequelae of microvascular disease.  EXTRA-AXIAL: No extra-axial fluid collection is present.  INTRACRANIAL HEMORRHAGE: None.    VISUALIZED SINUSES: No air-fluid levels are identified.  VISUALIZED MASTOIDS: Clear.  CALVARIUM:  Intact.  MISCELLANEOUS:  None.    SOFT TISSUES: Unremarkable.  BONES: Unremarkable.      IMPRESSION:    HEAD CT: No acute hemorrhage or midline shift.    --- End of Report ---            MICHAEL AGUIRRE MD; Attending Radiologist  This document has been electronically signed. Mar 13 2022  3:03PM    < end of copied text >      Imaging Personally Reviewed:  [ ] YES  [ ] NO   Patient is a 56y old  Male who presents with a chief complaint of Weakness / Ataxia / Possible alcohol intoxication (14 Mar 2022 13:04)    OVERNIGHT EVENTS: no acute changes    REVIEW OF SYSTEMS:  CONSTITUTIONAL: No fever, chills  NECK: No pain or stiffness  RESPIRATORY: No cough, SOB  CARDIOVASCULAR: No chest pain, palpitations  GASTROINTESTINAL: No abdominal pain. No nausea, vomiting, or diarrhea  GENITOURINARY: No dysuria  NEUROLOGICAL: No HA  SKIN: No itching, burning, rashes, or lesions   MUSCULOSKELETAL: No joint pain or swelling; No muscle, back, or extremity pain    T(C): 36.9 (03-14-22 @ 16:40), Max: 37 (03-14-22 @ 13:21)  HR: 102 (03-14-22 @ 16:40) (86 - 183)  BP: 153/85 (03-14-22 @ 16:40) (138/78 - 165/82)  RR: 18 (03-14-22 @ 16:40) (17 - 18)  SpO2: 98% (03-14-22 @ 16:40) (95% - 100%)  Wt(kg): --Vital Signs Last 24 Hrs  T(C): 36.9 (14 Mar 2022 16:40), Max: 37 (14 Mar 2022 13:21)  T(F): 98.5 (14 Mar 2022 16:40), Max: 98.6 (14 Mar 2022 13:21)  HR: 102 (14 Mar 2022 16:40) (86 - 183)  BP: 153/85 (14 Mar 2022 16:40) (138/78 - 165/82)  BP(mean): 99 (14 Mar 2022 13:21) (99 - 99)  RR: 18 (14 Mar 2022 16:40) (17 - 18)  SpO2: 98% (14 Mar 2022 16:40) (95% - 100%)    MEDICATIONS  (STANDING):  folic acid 1 milliGRAM(s) Oral daily  gabapentin 100 milliGRAM(s) Oral three times a day  heparin   Injectable 5000 Unit(s) SubCutaneous every 8 hours  influenza   Vaccine 0.5 milliLiter(s) IntraMuscular once  insulin lispro (ADMELOG) corrective regimen sliding scale   SubCutaneous three times a day before meals  LORazepam   Injectable 2 milliGRAM(s) IV Push every 6 hours  multivitamin 1 Tablet(s) Oral daily  thiamine IVPB 500 milliGRAM(s) IV Intermittent daily    MEDICATIONS  (PRN):  LORazepam   Injectable 2 milliGRAM(s) IV Push every 4 hours PRN CIWA-Ar score 8 or greater    PHYSICAL EXAM:  GENERAL: anxious appearing, NAD  EYES: clear conjunctiva  ENMT: Moist mucous membranes  NECK: Supple, No JVD  CHEST/LUNG: Clear to auscultation bilaterally; No rales, rhonchi, wheezing, or rubs  HEART: S1, S2, Regular rate and rhythm  ABDOMEN: Soft, Nontender, Nondistended; Bowel sounds present  NEURO: Alert & Oriented X3, dec sensation b/l LE   EXTREMITIES: +tremors bilateral hands. No LE edema, no calf tenderness  SKIN: No rashes or lesions    Consultant(s) Notes Reviewed:  [x ] YES  [ ] NO  Care Discussed with Consultants/Other Providers [ x] YES  [ ] NO    LABS:                        13.1   4.27  )-----------( 60       ( 14 Mar 2022 05:10 )             37.3     03-14    138  |  106  |  7   ----------------------------<  95  3.6   |  26  |  0.78    Ca    8.5      14 Mar 2022 05:10  Phos  3.1     03-14  Mg     2.3     03-14    TPro  6.3  /  Alb  3.0<L>  /  TBili  1.7<H>  /  DBili  x   /  AST  91<H>  /  ALT  92<H>  /  AlkPhos  83  03-14      CAPILLARY BLOOD GLUCOSE      POCT Blood Glucose.: 134 mg/dL (14 Mar 2022 16:23)  POCT Blood Glucose.: 112 mg/dL (14 Mar 2022 11:31)  POCT Blood Glucose.: 129 mg/dL (14 Mar 2022 08:29)            RADIOLOGY & ADDITIONAL TESTS:  < from: MR Thoracic Spine No Cont (03.14.22 @ 14:04) >    ACC: [id number] EXAM:  MR SPINE LUMBAR                        ACC: [id number] EXAM:  MR SPINE THORACIC                          PROCEDURE DATE:  03/14/2022          INTERPRETATION:  MR thoracic and lumbar without gadolinium    CLINICAL INDICATION: Lower extremity weakness and decreased sensation    TECHNIQUE:   Sagittal  and axial T1-weighted images, sagittal STIR   images,  and sagittal and axial T2-weighted images of the thoracic spine   were obtained.    FINDINGS:   No prior similar studies are available for review.    Thoracic and lumbar vertebral alignment is significant for a slightly   exaggerated thoracic kyphosis.  Thoracic and lumbar vertebral body   heights are maintained.  Marrow signal intensity within thoracic and   lumbar vertebral bodies and posterior elements is unremarkable.  No   osseous expansion, epidural disease or paraspinal abnormality is found.    Thoracic and lumbar intervertebral discs show disc degeneration and   spondylosis at T6-7 through T11-T12 as well as L3-4 through L5-S1 with   loss of disc height and signal within the nucleus pulposus. Disc bulges   are noted at these levels which minimally flatten the ventral thecal sac   disc herniation noted arachnoid space.    The thoracic cord maintains intact morphology.  Thoracic cord signal   intensity is intact. The conus medullaris terminates at T12.      IMPRESSION: Moderate disc degeneration and spondylosis at T6-7 through   T11-T12 as well as L3-4 through L5-S1 with loss of disc height and signal   within the nucleus pulposus. Disc bulges are noted at these levels which   minimally flatten the ventral thecal sac disc herniation noted arachnoid   space.      --- End of Report ---            KELL NGO MD; Attending Radiologist  This document has been electronically signed. Mar 14 2022  2:45PM    < end of copied text >  < from: US Abdomen Upper Quadrant Right (03.14.22 @ 15:40) >  ACC: [id number] EXAM:  US ABDOMEN RT UPR QUADRANT                          PROCEDURE DATE:  03/14/2022          INTERPRETATION:  CLINICAL INFORMATION: Elevated liver enzymes    COMPARISON: None available.    TECHNIQUE: Sonography of the right upper quadrant.    FINDINGS:    Liver: Increased echogenicity. Enlarged (17.9 cm). Patent main portal   vein with normal flow direction.  Bile ducts: Normal caliber. Common bile duct measures 4.6 mm.  Gallbladder: Within normal limits.  Pancreas: Visualized portions are within normal limits.  Right kidney: 10.8 cm. No hydronephrosis.  Ascites: None.  IVC: Visualized portions are within normal limits.    IMPRESSION:    Hepatic steatosis.  No cholelithiasis or biliary ductal dilatation.        --- End of Report ---            ELIE BAILEY MD; Attending Radiologist  This document has been electronically signed. Mar 14 2022  3:41PM    < end of copied text >  < from: Xray Chest 1 View-PORTABLE IMMEDIATE (Xray Chest 1 View-PORTABLE IMMEDIATE .) (03.13.22 @ 21:06) >    ACC: [id number] EXAM:  XR CHEST PORTABLE IMMED 1V                          PROCEDURE DATE:  03/13/2022          INTERPRETATION:  Unsteady gait.    AP chest.    Normal heart mediastinum. No consolidation or effusion. Elevation right   hemidiaphragm.    IMPRESSION: No active infiltrates.    --- End of Report ---            ARMAND DELGADO MD; Attending Radiologist  This document has been electronically signed. Mar 14 2022  9:47AM    < end of copied text >  < from: CT Head No Cont (03.13.22 @ 14:35) >  ACC: [id number] EXAM:  CT BRAIN                          PROCEDURE DATE:  03/13/2022          INTERPRETATION:  CT HEAD  HEAD CT    INDICATIONS: headache and leg numbness, brother called 911 because he   found the patient on the floor and the patient was having trouble moving.   reports headache that began approximately 1 hour prior to arrival. no   changes in vision. has had ongoing numbness in his legs for months, as   well as chest pain and shortness of breath. reports he last drink alcohol   last night, 2 glasses of wine. patient has been complaining of similar   symptoms for several months.    TECHNIQUE:    HEAD CT:  Serial axial images were obtained from the skull base to the vertex   without the use of intravenous contrast.    COMPARISON EXAMINATION: None.    FINDINGS:    HEAD CT:    VENTRICLES AND SULCI: Ventricles and sulci are unremarkable for patient   age.  INTRA-AXIAL: No intracranial mass, acute hemorrhage, or midline shift is   present. There is non-specific decreased attenuation in the white matter   likely related to sequelae of microvascular disease.  EXTRA-AXIAL: No extra-axial fluid collection is present.  INTRACRANIAL HEMORRHAGE: None.    VISUALIZED SINUSES: No air-fluid levels are identified.  VISUALIZED MASTOIDS: Clear.  CALVARIUM:  Intact.  MISCELLANEOUS:  None.    SOFT TISSUES: Unremarkable.  BONES: Unremarkable.      IMPRESSION:    HEAD CT: No acute hemorrhage or midline shift.    --- End of Report ---            MICHAEL AGUIRRE MD; Attending Radiologist  This document has been electronically signed. Mar 13 2022  3:03PM    < end of copied text >      Imaging Personally Reviewed:  [ ] YES  [ ] NO

## 2022-03-14 NOTE — PROGRESS NOTE ADULT - ATTENDING COMMENTS
56 year old man with PMH of chronic alcohol abuse here with acute onset difficulty ambulating with generalized weakness. He was found on the floor by family weak. No LOC, no focal weakness, no GI symptoms. Long standing hx of numbness in his legs and was in the ED a day prior for this; work up for this has remained unremarkable.    Labs reviewed    P/E as above    A/P:    #Neuropathy  #Spondylosis  #Alcohol Withdrawal  #Mild Alcohol Liver disease     Plan:  -Patient note with sensory neuropathy. MR Thoracic and lumbar spine: Moderate disc degeneration and spondylosis at T6-7 through   T11-T12 as well as L3-4 through L5-S1 with loss of disc height and signal within the nucleus pulposus. Disc bulges are noted at these levels which minimally flatten the ventral thecal sac disc herniation noted arachnoid space.  -C/w gabapentin   -Will initiate CIWA protocol, ativan taper  -PT recommended home with home PT   -Follow neurology consult

## 2022-03-15 LAB
ALBUMIN SERPL ELPH-MCNC: 3.2 G/DL — LOW (ref 3.5–5)
ALP SERPL-CCNC: 75 U/L — SIGNIFICANT CHANGE UP (ref 40–120)
ALT FLD-CCNC: 76 U/L DA — HIGH (ref 10–60)
ANION GAP SERPL CALC-SCNC: 7 MMOL/L — SIGNIFICANT CHANGE UP (ref 5–17)
AST SERPL-CCNC: 57 U/L — HIGH (ref 10–40)
BILIRUB SERPL-MCNC: 1.9 MG/DL — HIGH (ref 0.2–1.2)
BUN SERPL-MCNC: 6 MG/DL — LOW (ref 7–18)
CALCIUM SERPL-MCNC: 8.8 MG/DL — SIGNIFICANT CHANGE UP (ref 8.4–10.5)
CHLORIDE SERPL-SCNC: 108 MMOL/L — SIGNIFICANT CHANGE UP (ref 96–108)
CO2 SERPL-SCNC: 24 MMOL/L — SIGNIFICANT CHANGE UP (ref 22–31)
CREAT SERPL-MCNC: 0.74 MG/DL — SIGNIFICANT CHANGE UP (ref 0.5–1.3)
EGFR: 107 ML/MIN/1.73M2 — SIGNIFICANT CHANGE UP
GLUCOSE SERPL-MCNC: 125 MG/DL — HIGH (ref 70–99)
HCT VFR BLD CALC: 39.3 % — SIGNIFICANT CHANGE UP (ref 39–50)
HGB BLD-MCNC: 13.9 G/DL — SIGNIFICANT CHANGE UP (ref 13–17)
MAGNESIUM SERPL-MCNC: 2 MG/DL — SIGNIFICANT CHANGE UP (ref 1.6–2.6)
MCHC RBC-ENTMCNC: 35.4 GM/DL — SIGNIFICANT CHANGE UP (ref 32–36)
MCHC RBC-ENTMCNC: 35.4 PG — HIGH (ref 27–34)
MCV RBC AUTO: 100 FL — SIGNIFICANT CHANGE UP (ref 80–100)
MRSA PCR RESULT.: SIGNIFICANT CHANGE UP
MRSA PCR RESULT.: SIGNIFICANT CHANGE UP
NRBC # BLD: 0 /100 WBCS — SIGNIFICANT CHANGE UP (ref 0–0)
PHOSPHATE SERPL-MCNC: 4.4 MG/DL — SIGNIFICANT CHANGE UP (ref 2.5–4.5)
PLATELET # BLD AUTO: 64 K/UL — LOW (ref 150–400)
POTASSIUM SERPL-MCNC: 4 MMOL/L — SIGNIFICANT CHANGE UP (ref 3.5–5.3)
POTASSIUM SERPL-SCNC: 4 MMOL/L — SIGNIFICANT CHANGE UP (ref 3.5–5.3)
PROT SERPL-MCNC: 6.6 G/DL — SIGNIFICANT CHANGE UP (ref 6–8.3)
RBC # BLD: 3.93 M/UL — LOW (ref 4.2–5.8)
RBC # FLD: 11.7 % — SIGNIFICANT CHANGE UP (ref 10.3–14.5)
S AUREUS DNA NOSE QL NAA+PROBE: SIGNIFICANT CHANGE UP
S AUREUS DNA NOSE QL NAA+PROBE: SIGNIFICANT CHANGE UP
SODIUM SERPL-SCNC: 139 MMOL/L — SIGNIFICANT CHANGE UP (ref 135–145)
WBC # BLD: 4.91 K/UL — SIGNIFICANT CHANGE UP (ref 3.8–10.5)
WBC # FLD AUTO: 4.91 K/UL — SIGNIFICANT CHANGE UP (ref 3.8–10.5)

## 2022-03-15 RX ORDER — FOLIC ACID 0.8 MG
1 TABLET ORAL DAILY
Refills: 0 | Status: DISCONTINUED | OUTPATIENT
Start: 2022-03-15 | End: 2022-03-16

## 2022-03-15 RX ADMIN — DEXMEDETOMIDINE HYDROCHLORIDE IN 0.9% SODIUM CHLORIDE 4.43 MICROGRAM(S)/KG/HR: 4 INJECTION INTRAVENOUS at 00:06

## 2022-03-15 RX ADMIN — Medication 2 MILLIGRAM(S): at 23:25

## 2022-03-15 RX ADMIN — Medication 75 MILLIGRAM(S): at 13:25

## 2022-03-15 RX ADMIN — CHLORHEXIDINE GLUCONATE 1 APPLICATION(S): 213 SOLUTION TOPICAL at 05:35

## 2022-03-15 RX ADMIN — Medication 2 MILLIGRAM(S): at 01:49

## 2022-03-15 RX ADMIN — Medication 2 MILLIGRAM(S): at 18:45

## 2022-03-15 RX ADMIN — Medication 105 MILLIGRAM(S): at 05:30

## 2022-03-15 RX ADMIN — Medication 1 MILLIGRAM(S): at 11:56

## 2022-03-15 RX ADMIN — Medication 2 MILLIGRAM(S): at 05:30

## 2022-03-15 RX ADMIN — Medication 130 MILLIGRAM(S): at 00:30

## 2022-03-15 RX ADMIN — Medication 105 MILLIGRAM(S): at 00:54

## 2022-03-15 RX ADMIN — Medication 75 MILLIGRAM(S): at 21:20

## 2022-03-15 RX ADMIN — Medication 105 MILLIGRAM(S): at 13:25

## 2022-03-15 RX ADMIN — Medication 105 MILLIGRAM(S): at 22:13

## 2022-03-15 RX ADMIN — Medication 2 MILLIGRAM(S): at 21:19

## 2022-03-15 RX ADMIN — DEXMEDETOMIDINE HYDROCHLORIDE IN 0.9% SODIUM CHLORIDE 4.43 MICROGRAM(S)/KG/HR: 4 INJECTION INTRAVENOUS at 01:37

## 2022-03-15 RX ADMIN — SODIUM CHLORIDE 75 MILLILITER(S): 9 INJECTION, SOLUTION INTRAVENOUS at 00:07

## 2022-03-15 NOTE — PROGRESS NOTE ADULT - ATTENDING COMMENTS
Assessment:  1. Alcohol withdrawal syndrome  2. Thrombocytopenia   3. Transaminitis   4. Bilateral lower extremity weakness  5. Wernicke's Encephalopathy     Plan:  - Taper down precedex  - Ativan prn for CIWA > 7  - May need librium   - Aspiration and seizure precautions  - Thiamine and folate supplementation  - Oral diet if awake   - Monitor for signs of bleeding  - LFTs downtrending  - SCD for DVT prophylaxis  - Cont. ICU care

## 2022-03-15 NOTE — CHART NOTE - NSCHARTNOTEFT_GEN_A_CORE
EVENT:  56 years old male with past medical history of chronic alcohol use was brought to ED by family member after he was found on floor in home.     RRT called initially about 9:15pm when patient was uncooperative, leaving his room and being restless, then changed to [place]. Team responded, patient noted to have [id number], increased to 38. CIWA intervention initiated by Dr. Napier. Ativan given as ordered, due to elevated HR, sweating, ICU consulted because patient needs Precedex drip    SUBJECTIVE:  Delirium, no acute respiratory distress    OBJECTIVE:  Vital Signs Last 24 Hrs  T(C): 36.1 (15 Mar 2022 00:00), Max: 37.8 (14 Mar 2022 18:04)  T(F): 97 (15 Mar 2022 00:00), Max: 100 (14 Mar 2022 18:04)  HR: 83 (15 Mar 2022 00:00) (83 - 130)  BP: 164/99 (15 Mar 2022 00:00) (138/78 - 164/99)  BP(mean): 112 (15 Mar 2022 00:00) (99 - 112)  RR: 29 (15 Mar 2022 00:00) (17 - 29)  SpO2: 94% (15 Mar 2022 00:00) (94% - 100%)    LABS:                        13.1   4.27  )-----------( 60       ( 14 Mar 2022 05:10 )             37.3     03-14    138  |  106  |  7   ----------------------------<  95  3.6   |  26  |  0.78    Ca    8.5      14 Mar 2022 05:10  Phos  3.1     03-14  Mg     2.3     03-14    T Pro  6.3  /  Alb  3.0<L>  /  T Bili  1.7<H>  /  D Bili  x   /  AST  91<H>  /  ALT  92<H>  /  Alk Phos  83  03-14    Assessment  Delirium tremens, severe alcohol withdrawal   - s/p 16 mg of Ativan over last 24 hours  - Transferred to ICU for Precedex gtt, titrate down as tolerated  - pt on ativan standing 2q4 and 2q2 PRN   - will consider phenobarbital in case needed   - c/w CIWA protocol  - Monitor electrolytes and replace as needed

## 2022-03-15 NOTE — PROGRESS NOTE ADULT - SUBJECTIVE AND OBJECTIVE BOX
INTERVAL HPI/OVERNIGHT EVENTS: ***    PRESSORS: [ ] YES [ ] NO  WHICH:      Antimicrobial:    Cardiovascular:    Pulmonary:    Hematalogic:    Other:  chlorhexidine 2% Cloths 1 Application(s) Topical <User Schedule>  dexMEDEtomidine Infusion 0.2 MICROgram(s)/kG/Hr IV Continuous <Continuous>  folic acid Injectable 1 milliGRAM(s) IV Push daily  influenza   Vaccine 0.5 milliLiter(s) IntraMuscular once  insulin lispro (ADMELOG) corrective regimen sliding scale   SubCutaneous three times a day before meals  LORazepam   Injectable 2 milliGRAM(s) IV Push every 2 hours PRN  pantoprazole    Tablet 40 milliGRAM(s) Oral before breakfast  thiamine IVPB 500 milliGRAM(s) IV Intermittent every 8 hours    chlorhexidine 2% Cloths 1 Application(s) Topical <User Schedule>  dexMEDEtomidine Infusion 0.2 MICROgram(s)/kG/Hr IV Continuous <Continuous>  folic acid Injectable 1 milliGRAM(s) IV Push daily  influenza   Vaccine 0.5 milliLiter(s) IntraMuscular once  insulin lispro (ADMELOG) corrective regimen sliding scale   SubCutaneous three times a day before meals  LORazepam   Injectable 2 milliGRAM(s) IV Push every 2 hours PRN  pantoprazole    Tablet 40 milliGRAM(s) Oral before breakfast  thiamine IVPB 500 milliGRAM(s) IV Intermittent every 8 hours    Drug Dosing Weight  Height (cm): 188 (13 Mar 2022 13:38)  Weight (kg): 91.3 (15 Mar 2022 00:00)  BMI (kg/m2): 25.8 (15 Mar 2022 00:00)  BSA (m2): 2.18 (15 Mar 2022 00:00)    CENTRAL LINE: [ ] YES [ ] NO  LOCATION:         BURNETT: [ ] YES [ ] NO          A-LINE:  [ ] YES [ ] NO  LOCATION:             ICU Vital Signs Last 24 Hrs  T(C): 36.6 (15 Mar 2022 04:00), Max: 37.8 (14 Mar 2022 18:04)  T(F): 97.9 (15 Mar 2022 04:00), Max: 100 (14 Mar 2022 18:04)  HR: 64 (15 Mar 2022 07:00) (64 - 130)  BP: 156/93 (15 Mar 2022 07:00) (129/81 - 164/99)  BP(mean): 107 (15 Mar 2022 07:00) (92 - 112)  ABP: --  ABP(mean): --  RR: 25 (15 Mar 2022 07:00) (17 - 29)  SpO2: 100% (15 Mar 2022 07:00) (87% - 100%)            03-14 @ 07:01  -  03-15 @ 07:00  --------------------------------------------------------  IN: 337.1 mL / OUT: 0 mL / NET: 337.1 mL            REVIEW OF SYSTEMS:    CONSTITUTIONAL: No weakness, fevers or chills  NECK: No pain or stiffness  RESPIRATORY: No cough, wheezing, hemoptysis; No shortness of breath  CARDIOVASCULAR: No chest pain or palpitations  GASTROINTESTINAL: No abdominal or epigastric pain. No nausea, vomiting, No diarrhea or constipation. No melena or hematochezia.  GENITOURINARY: No dysuria, frequency or hematuria  NEUROLOGICAL: No numbness or weakness  All other review of systems is negative unless indicated above      PHYSICAL EXAM:    GENERAL: NAD, well-groomed, well-developed  EYES: EOMI, PERRLA,   NECK: Supple, No JVD; Normal thyroid; Trachea midline  NERVOUS SYSTEM:  Alert & Oriented X3,  Motor Strength 5/5 B/L upper and lower extremities; DTRs 2+ intact and symmetric  CHEST/LUNG: No rales, rhonchi, wheezing   HEART: Regular rate and rhythm; No murmurs,   ABDOMEN: Soft, Nontender, Nondistended; Bowel sounds present  EXTREMITIES:  2+ Peripheral Pulses, No clubbing, cyanosis, or edema        LABS:  CBC Full  -  ( 14 Mar 2022 05:10 )  WBC Count : 4.27 K/uL  RBC Count : 3.75 M/uL  Hemoglobin : 13.1 g/dL  Hematocrit : 37.3 %  Platelet Count - Automated : 60 K/uL  Mean Cell Volume : 99.5 fl  Mean Cell Hemoglobin : 34.9 pg  Mean Cell Hemoglobin Concentration : 35.1 gm/dL  Auto Neutrophil # : 2.68 K/uL  Auto Lymphocyte # : 0.99 K/uL  Auto Monocyte # : 0.51 K/uL  Auto Eosinophil # : 0.03 K/uL  Auto Basophil # : 0.04 K/uL  Auto Neutrophil % : 62.8 %  Auto Lymphocyte % : 23.2 %  Auto Monocyte % : 11.9 %  Auto Eosinophil % : 0.7 %  Auto Basophil % : 0.9 %    03-15    139  |  108  |  6<L>  ----------------------------<  125<H>  4.0   |  24  |  0.74    Ca    8.8      15 Mar 2022 04:13  Phos  4.4     03-15  Mg     2.0     03-15    TPro  6.6  /  Alb  3.2<L>  /  TBili  1.9<H>  /  DBili  x   /  AST  57<H>  /  ALT  76<H>  /  AlkPhos  75  03-15            RADIOLOGY & ADDITIONAL STUDIES REVIEWED:  ***    [ ]GOALS OF CARE DISCUSSION WITH PATIENT/FAMILY/PROXY:    CRITICAL CARE TIME SPENT: 35 minutes INTERVAL HPI/OVERNIGHT EVENTS: Pt does not open eyes to sternal rub. Moans to sternal rub. RAAS -4.     PRESSORS: [ ] YES [ ] NO  WHICH:      Antimicrobial:    Cardiovascular:    Pulmonary:    Hematalogic:    Other:  chlorhexidine 2% Cloths 1 Application(s) Topical <User Schedule>  dexMEDEtomidine Infusion 0.2 MICROgram(s)/kG/Hr IV Continuous <Continuous>  folic acid Injectable 1 milliGRAM(s) IV Push daily  influenza   Vaccine 0.5 milliLiter(s) IntraMuscular once  insulin lispro (ADMELOG) corrective regimen sliding scale   SubCutaneous three times a day before meals  LORazepam   Injectable 2 milliGRAM(s) IV Push every 2 hours PRN  pantoprazole    Tablet 40 milliGRAM(s) Oral before breakfast  thiamine IVPB 500 milliGRAM(s) IV Intermittent every 8 hours    chlorhexidine 2% Cloths 1 Application(s) Topical <User Schedule>  dexMEDEtomidine Infusion 0.2 MICROgram(s)/kG/Hr IV Continuous <Continuous>  folic acid Injectable 1 milliGRAM(s) IV Push daily  influenza   Vaccine 0.5 milliLiter(s) IntraMuscular once  insulin lispro (ADMELOG) corrective regimen sliding scale   SubCutaneous three times a day before meals  LORazepam   Injectable 2 milliGRAM(s) IV Push every 2 hours PRN  pantoprazole    Tablet 40 milliGRAM(s) Oral before breakfast  thiamine IVPB 500 milliGRAM(s) IV Intermittent every 8 hours    Drug Dosing Weight  Height (cm): 188 (13 Mar 2022 13:38)  Weight (kg): 91.3 (15 Mar 2022 00:00)  BMI (kg/m2): 25.8 (15 Mar 2022 00:00)  BSA (m2): 2.18 (15 Mar 2022 00:00)    CENTRAL LINE: [ ] YES [ ] NO  LOCATION:         BURNETT: [ ] YES [ ] NO          A-LINE:  [ ] YES [ ] NO  LOCATION:             ICU Vital Signs Last 24 Hrs  T(C): 36.6 (15 Mar 2022 04:00), Max: 37.8 (14 Mar 2022 18:04)  T(F): 97.9 (15 Mar 2022 04:00), Max: 100 (14 Mar 2022 18:04)  HR: 64 (15 Mar 2022 07:00) (64 - 130)  BP: 156/93 (15 Mar 2022 07:00) (129/81 - 164/99)  BP(mean): 107 (15 Mar 2022 07:00) (92 - 112)  ABP: --  ABP(mean): --  RR: 25 (15 Mar 2022 07:00) (17 - 29)  SpO2: 100% (15 Mar 2022 07:00) (87% - 100%)            03-14 @ 07:01  -  03-15 @ 07:00  --------------------------------------------------------  IN: 337.1 mL / OUT: 0 mL / NET: 337.1 mL            REVIEW OF SYSTEMS:    as stated in hpi      PHYSICAL EXAM:    GENERAL: NAD, well-groomed, well-developed  EYES: pinpoint pupils, Pupils are reactive to light   NECK: Supple, No JVD; Normal thyroid; Trachea midline  NERVOUS SYSTEM:  sedated to RASS -4  CHEST/LUNG: No rales, rhonchi, wheezing   HEART: Regular rate and rhythm; No murmurs,   ABDOMEN: Soft, Nontender, Nondistended; Bowel sounds present  EXTREMITIES:  2+ Peripheral Pulses, No clubbing, cyanosis, or edema        LABS:  CBC Full  -  ( 14 Mar 2022 05:10 )  WBC Count : 4.27 K/uL  RBC Count : 3.75 M/uL  Hemoglobin : 13.1 g/dL  Hematocrit : 37.3 %  Platelet Count - Automated : 60 K/uL  Mean Cell Volume : 99.5 fl  Mean Cell Hemoglobin : 34.9 pg  Mean Cell Hemoglobin Concentration : 35.1 gm/dL  Auto Neutrophil # : 2.68 K/uL  Auto Lymphocyte # : 0.99 K/uL  Auto Monocyte # : 0.51 K/uL  Auto Eosinophil # : 0.03 K/uL  Auto Basophil # : 0.04 K/uL  Auto Neutrophil % : 62.8 %  Auto Lymphocyte % : 23.2 %  Auto Monocyte % : 11.9 %  Auto Eosinophil % : 0.7 %  Auto Basophil % : 0.9 %    03-15    139  |  108  |  6<L>  ----------------------------<  125<H>  4.0   |  24  |  0.74    Ca    8.8      15 Mar 2022 04:13  Phos  4.4     03-15  Mg     2.0     03-15    TPro  6.6  /  Alb  3.2<L>  /  TBili  1.9<H>  /  DBili  x   /  AST  57<H>  /  ALT  76<H>  /  AlkPhos  75  03-15            RADIOLOGY & ADDITIONAL STUDIES REVIEWED:      [ ]GOALS OF CARE DISCUSSION WITH PATIENT/FAMILY/PROXY:    CRITICAL CARE TIME SPENT: 35 minutes

## 2022-03-15 NOTE — PROGRESS NOTE ADULT - ASSESSMENT
55yo M with PMH of chronic alcohol  p/w acute onset difficulty ambulating with generalized weakness. patient admitted to medicine , CIWA protocol initiated and patient received total of 12 mg Ativan, standing +PRN,  on floor, RRt was called for high CIWA to 24, pt tachycardic , Agitated , disoriented,  profusely sweating, hallucinating, tremolos,  4 additional mg of Ativan was given without improving in sx , patient transferred to ICU for severe alcohol withdrawal requiring close monitoring.    Of note : last drink unknown     Assessment:  Alcohol withdrawal   Moderate disc degeneration and spondylosis  Wernicke's Encephalopathy   Peripheral neuropathy     Plan  =====================[CNS ]======================  # Delirium tremens, severe alcohol withdrawal   - s/p 16 mg Ativan over last 24 hours  - cont Precedex gtt, titrate to RASS of 0 to -1  - Ativan 2q2 prn CIWA protocol  - consider librium taper  - c/w CIWA protocol    # Wernicke's Encephalopathy &Peripheral neuropathy  - c/w Thiamine replacement   - maintain fall precaution   - d/c gabapentin    # Spondylosis  - MR Thoracic and lumbar spine: Moderate disc degeneration and spondylosis at T6-7 through   T11-T12 as well as L3-4 through L5-S1 with loss of disc height and signal within the nucleus pulposus. Disc bulges are noted at these levels which minimally flatten the ventral thecal sac disc herniation noted arachnoid space.  - Neuro Dr. Tyler consulted    =====================[CVS ]===============================  # Tachycardia   - in setting of alcohol withdrawal   - c/w above mentioned  managemnet  - IVF therapy while NPO    =====================[RESP ]==============================  # No issues   - currently on RA     =====================[ GI ]================================  # Mild Alcohol Liver disease   - Monitor LFT  - avoid hepatotoxic meds    # NPO except meds fo now  - resume diet when mental status improves     ====================[ RENAL ]=============================   # No issues   - monitor BMP and electrolytes       =====================[ ID ]================================  # No issues     ===================[ HEME/ONC ]===========================  # Thrombocytopenia :   - likely 2/2 chronic alcohol abuse   - no active bleeding   - monitor CBC daily     =====================[ ENDO ]=============================  # Hyperglycemia   - A1c 5.4  - target CBG < 180  - currently well controlled on HSS     ================= Skin/Catheters============================  # No active issues   - No rashes.      ==================[ PROPHYLAXIS ]==========================   # Dvt : SCD boots  # Gi : Protonix     ====================[ DISPO ]==============================   - Monitor in ICU    - family notified: Brother Ck notified   - wife :Carolyn Dick : phone number [phone]    ===================[ PROGNOSIS ]===========================  - Guarded

## 2022-03-16 LAB
ALBUMIN SERPL ELPH-MCNC: 3.2 G/DL — LOW (ref 3.5–5)
ALP SERPL-CCNC: 76 U/L — SIGNIFICANT CHANGE UP (ref 40–120)
ALT FLD-CCNC: 68 U/L DA — HIGH (ref 10–60)
ANION GAP SERPL CALC-SCNC: 6 MMOL/L — SIGNIFICANT CHANGE UP (ref 5–17)
AST SERPL-CCNC: 52 U/L — HIGH (ref 10–40)
BASOPHILS # BLD AUTO: 0.05 K/UL — SIGNIFICANT CHANGE UP (ref 0–0.2)
BASOPHILS NFR BLD AUTO: 0.9 % — SIGNIFICANT CHANGE UP (ref 0–2)
BILIRUB SERPL-MCNC: 1.8 MG/DL — HIGH (ref 0.2–1.2)
BUN SERPL-MCNC: 10 MG/DL — SIGNIFICANT CHANGE UP (ref 7–18)
CALCIUM SERPL-MCNC: 8.6 MG/DL — SIGNIFICANT CHANGE UP (ref 8.4–10.5)
CHLORIDE SERPL-SCNC: 107 MMOL/L — SIGNIFICANT CHANGE UP (ref 96–108)
CO2 SERPL-SCNC: 24 MMOL/L — SIGNIFICANT CHANGE UP (ref 22–31)
CREAT SERPL-MCNC: 0.72 MG/DL — SIGNIFICANT CHANGE UP (ref 0.5–1.3)
EGFR: 108 ML/MIN/1.73M2 — SIGNIFICANT CHANGE UP
EOSINOPHIL # BLD AUTO: 0.12 K/UL — SIGNIFICANT CHANGE UP (ref 0–0.5)
EOSINOPHIL NFR BLD AUTO: 2.1 % — SIGNIFICANT CHANGE UP (ref 0–6)
GLUCOSE SERPL-MCNC: 98 MG/DL — SIGNIFICANT CHANGE UP (ref 70–99)
HCT VFR BLD CALC: 40.6 % — SIGNIFICANT CHANGE UP (ref 39–50)
HGB BLD-MCNC: 14.3 G/DL — SIGNIFICANT CHANGE UP (ref 13–17)
IMM GRANULOCYTES NFR BLD AUTO: 0.2 % — SIGNIFICANT CHANGE UP (ref 0–1.5)
LYMPHOCYTES # BLD AUTO: 0.93 K/UL — LOW (ref 1–3.3)
LYMPHOCYTES # BLD AUTO: 16.1 % — SIGNIFICANT CHANGE UP (ref 13–44)
MAGNESIUM SERPL-MCNC: 2 MG/DL — SIGNIFICANT CHANGE UP (ref 1.6–2.6)
MCHC RBC-ENTMCNC: 35.2 GM/DL — SIGNIFICANT CHANGE UP (ref 32–36)
MCHC RBC-ENTMCNC: 35.7 PG — HIGH (ref 27–34)
MCV RBC AUTO: 101.2 FL — HIGH (ref 80–100)
MONOCYTES # BLD AUTO: 0.65 K/UL — SIGNIFICANT CHANGE UP (ref 0–0.9)
MONOCYTES NFR BLD AUTO: 11.2 % — SIGNIFICANT CHANGE UP (ref 2–14)
NEUTROPHILS # BLD AUTO: 4.03 K/UL — SIGNIFICANT CHANGE UP (ref 1.8–7.4)
NEUTROPHILS NFR BLD AUTO: 69.5 % — SIGNIFICANT CHANGE UP (ref 43–77)
NRBC # BLD: 0 /100 WBCS — SIGNIFICANT CHANGE UP (ref 0–0)
PHOSPHATE SERPL-MCNC: 3.8 MG/DL — SIGNIFICANT CHANGE UP (ref 2.5–4.5)
PLATELET # BLD AUTO: 59 K/UL — LOW (ref 150–400)
POTASSIUM SERPL-MCNC: 3.5 MMOL/L — SIGNIFICANT CHANGE UP (ref 3.5–5.3)
POTASSIUM SERPL-SCNC: 3.5 MMOL/L — SIGNIFICANT CHANGE UP (ref 3.5–5.3)
PROT SERPL-MCNC: 6.7 G/DL — SIGNIFICANT CHANGE UP (ref 6–8.3)
RBC # BLD: 4.01 M/UL — LOW (ref 4.2–5.8)
RBC # FLD: 11.5 % — SIGNIFICANT CHANGE UP (ref 10.3–14.5)
SODIUM SERPL-SCNC: 137 MMOL/L — SIGNIFICANT CHANGE UP (ref 135–145)
WBC # BLD: 5.79 K/UL — SIGNIFICANT CHANGE UP (ref 3.8–10.5)
WBC # FLD AUTO: 5.79 K/UL — SIGNIFICANT CHANGE UP (ref 3.8–10.5)

## 2022-03-16 RX ORDER — FOLIC ACID 0.8 MG
1 TABLET ORAL DAILY
Refills: 0 | Status: DISCONTINUED | OUTPATIENT
Start: 2022-03-16 | End: 2022-03-28

## 2022-03-16 RX ORDER — SODIUM CHLORIDE 9 MG/ML
1000 INJECTION, SOLUTION INTRAVENOUS
Refills: 0 | Status: DISCONTINUED | OUTPATIENT
Start: 2022-03-16 | End: 2022-03-24

## 2022-03-16 RX ORDER — SODIUM CHLORIDE 9 MG/ML
500 INJECTION, SOLUTION INTRAVENOUS ONCE
Refills: 0 | Status: COMPLETED | OUTPATIENT
Start: 2022-03-16 | End: 2022-03-16

## 2022-03-16 RX ADMIN — Medication 1 MILLIGRAM(S): at 13:18

## 2022-03-16 RX ADMIN — Medication 2 MILLIGRAM(S): at 02:23

## 2022-03-16 RX ADMIN — Medication 0: at 10:34

## 2022-03-16 RX ADMIN — Medication 50 MILLIGRAM(S): at 13:18

## 2022-03-16 RX ADMIN — SODIUM CHLORIDE 500 MILLILITER(S): 9 INJECTION, SOLUTION INTRAVENOUS at 13:35

## 2022-03-16 RX ADMIN — Medication 105 MILLIGRAM(S): at 22:14

## 2022-03-16 RX ADMIN — Medication 75 MILLIGRAM(S): at 05:18

## 2022-03-16 RX ADMIN — Medication 105 MILLIGRAM(S): at 12:19

## 2022-03-16 RX ADMIN — SODIUM CHLORIDE 75 MILLILITER(S): 9 INJECTION, SOLUTION INTRAVENOUS at 18:33

## 2022-03-16 RX ADMIN — CHLORHEXIDINE GLUCONATE 1 APPLICATION(S): 213 SOLUTION TOPICAL at 05:18

## 2022-03-16 RX ADMIN — SODIUM CHLORIDE 75 MILLILITER(S): 9 INJECTION, SOLUTION INTRAVENOUS at 13:40

## 2022-03-16 RX ADMIN — Medication 2 MILLIGRAM(S): at 16:28

## 2022-03-16 RX ADMIN — Medication 105 MILLIGRAM(S): at 05:17

## 2022-03-16 RX ADMIN — Medication 2 MILLIGRAM(S): at 04:19

## 2022-03-16 RX ADMIN — Medication 50 MILLIGRAM(S): at 22:14

## 2022-03-16 RX ADMIN — Medication 2 MILLIGRAM(S): at 06:37

## 2022-03-16 NOTE — CHART NOTE - NSCHARTNOTEFT_GEN_A_CORE
<Hospital course>    Patient is 56 years old male with past medical history of chronic alcohol use was brought to ED by family member after he was found on floor in home today afternoon. during Examination, Patient was AAOx1-2 but could be due to Ativan he got in ED. Attempted to call Ck but didn't answer. So information was obtained from charts. Patient has been complaining of generalized weakness and he said "I didn't feel my legs". Patient reports that he fell many times in last month. Patient admits that he drinks alcohol occasionally and not sure when the last time he drunk. On Examination; Patient had sensory deficits but able to move his extremities. Patient is able to follow commands.  Pt was admitted for delirium tremens, severe alcohol withdrawal to the ICU. Pt was started on Precedex gtt, librium taper, and ativan prn. Pt was placed on CIWA protocol. Pt was started on thiamine to for Wernicke's Encephalopathy. Pt has history of leg pain and ataxic gait. MR Thoracic and lumbar spine showed moderate disc degeneration and spondylosis at T6-7 through T11-T12 as well as L3-4 through L5-S1 with loss of disc height and signal within the nucleus pulposus. Disc bulges are noted at these levels which minimally flatten the ventral thecal sac disc herniation noted arachnoid space. Neuro Dr. Tyler consulted.  Pt had thrombocytopenia, likely 2/2 chronic alcohol abuse. SCD boots and Protonix for DVT and GI ppx.     Patient is stable for downgrade to floor under care of Dr. Murry for further management , covering resident Dr. Lupillo Almonte was informed.    <Things to follow up>  [facility] <Hospital course>    Patient is 56 years old male with past medical history of chronic alcohol use was brought to ED by family member after he was found on floor in home today afternoon. during Examination, Patient was AAOx1-2 but could be due to Ativan he got in ED. Attempted to call Ck but didn't answer. So information was obtained from charts. Patient has been complaining of generalized weakness and he said "I didn't feel my legs". Patient reports that he fell many times in last month. Patient admits that he drinks alcohol occasionally and not sure when the last time he drunk. On Examination; Patient had sensory deficits but able to move his extremities. Patient is able to follow commands.  Pt was admitted for delirium tremens, severe alcohol withdrawal to the ICU. Pt was started on Precedex gtt, librium taper, and ativan prn. Pt was placed on CIWA protocol. Pt was started on thiamine to for Wernicke's Encephalopathy. Pt has history of leg pain and ataxic gait. MR Thoracic and lumbar spine showed moderate disc degeneration and spondylosis at T6-7 through T11-T12 as well as L3-4 through L5-S1 with loss of disc height and signal within the nucleus pulposus. Disc bulges are noted at these levels which minimally flatten the ventral thecal sac disc herniation noted arachnoid space. Neuro Dr. Tyler consulted.  Pt had thrombocytopenia, likely 2/2 chronic alcohol abuse. SCD boots and Protonix for DVT and GI ppx.     Patient is stable for downgrade to floor , signed out to Dr Strickland and covering NP Candace    <Things to follow up>  [facility] monitoring   Platelet Count   Social work consult

## 2022-03-16 NOTE — DIETITIAN INITIAL EVALUATION ADULT. - PROBLEM SELECTOR PLAN 2
- Patient has hx of alcoholic use   - Unknown how much he drinks  - Alcohol level   - Will start CIWA protocol with IV ativan 2mg q2 PRN for CIWA >8  - Will get Urine screen   -  plan per MD

## 2022-03-16 NOTE — DIETITIAN INITIAL EVALUATION ADULT. - PROBLEM SELECTOR PLAN 1
- Patient with hx of weakness was brought after he was found on floor.  - Patient is complaining of ambulatory dysfunction  - Dec sensation over bilateral lower extremities   - CT head didn't show any acute deficits or hemorrhage.  - Patient has sensory deficits from toes to umbilical level ( fine touch and pressure )  - MRI thoracic and lumbar ordered   - Will consult neurology   - Will get EEG   - Fall precautions   - Bed rest   - Physical therapy plan per MD

## 2022-03-16 NOTE — DIETITIAN INITIAL EVALUATION ADULT. - PERTINENT MEDS FT
MEDICATIONS  (STANDING):  chlordiazePOXIDE   Oral   chlordiazePOXIDE 50 milliGRAM(s) Oral every 8 hours  chlorhexidine 2% Cloths 1 Application(s) Topical <User Schedule>  folic acid Injectable 1 milliGRAM(s) IV Push daily  influenza   Vaccine 0.5 milliLiter(s) IntraMuscular once  insulin lispro (ADMELOG) corrective regimen sliding scale   SubCutaneous three times a day before meals  pantoprazole    Tablet 40 milliGRAM(s) Oral before breakfast  thiamine IVPB 500 milliGRAM(s) IV Intermittent every 8 hours

## 2022-03-16 NOTE — DIETITIAN INITIAL EVALUATION ADULT. - OTHER INFO
Pt lives home PTA, confused, agitated at times, s/p RRT-->Code Gray, transferred to ICU, on CIWA protocol, asleep when visited today; Limited intake/wt change history data available at present; PO diet advanced 3/15/22, but pt refused to eat per nursing, intake %-->0-25% per flow sheet

## 2022-03-16 NOTE — DIETITIAN INITIAL EVALUATION ADULT. - PERTINENT LABORATORY DATA
03-16 Na137 mmol/L Glu 98 mg/dL K+ 3.5 mmol/L Cr  0.72 mg/dL BUN 10 mg/dL   03-16 Phos 3.8 mg/dL   03-16 Alb 3.2 g/dL<L>        03-14-22 @ 09:33 HgbA1C 5.3 [4.0 - 5.6]

## 2022-03-16 NOTE — DIETITIAN INITIAL EVALUATION ADULT. - PROBLEM SELECTOR PLAN 4
- Likely due to alcohol use and liver problems   - No bleeding  - Will continue to monitor plan per MD

## 2022-03-16 NOTE — PROGRESS NOTE ADULT - ATTENDING COMMENTS
Assessment:  1. Alcohol withdrawal syndrome  2. Thrombocytopenia   3. Transaminitis   4. Bilateral lower extremity weakness  5. Wernicke's Encephalopathy     Plan:  - Awake and not in distress  - Ativan prn for CIWA > 7  - Librium taper   - Aspiration and seizure precautions  - Thiamine and folate supplementation  - Oral diet if awake   - D/c insulin as hypoglycemic   - Monitor for signs of bleeding  - LFTs downtrending  - SCD for DVT prophylaxis  - Transfer to medical service

## 2022-03-16 NOTE — DIETITIAN INITIAL EVALUATION ADULT. - ADD RECOMMEND
May consider to upgrade food consistency if mentation improved; On Thiamine, Folic Acid Rx; Rec: MVI/minerals as medically feasible

## 2022-03-17 LAB
ALBUMIN SERPL ELPH-MCNC: 3 G/DL — LOW (ref 3.5–5)
ALP SERPL-CCNC: 80 U/L — SIGNIFICANT CHANGE UP (ref 40–120)
ALT FLD-CCNC: 64 U/L DA — HIGH (ref 10–60)
ANION GAP SERPL CALC-SCNC: 9 MMOL/L — SIGNIFICANT CHANGE UP (ref 5–17)
AST SERPL-CCNC: 54 U/L — HIGH (ref 10–40)
BILIRUB DIRECT SERPL-MCNC: 0.4 MG/DL — HIGH (ref 0–0.3)
BILIRUB INDIRECT FLD-MCNC: 1.2 MG/DL — HIGH (ref 0.2–1)
BILIRUB SERPL-MCNC: 1.6 MG/DL — HIGH (ref 0.2–1.2)
BILIRUB SERPL-MCNC: 1.6 MG/DL — HIGH (ref 0.2–1.2)
BUN SERPL-MCNC: 8 MG/DL — SIGNIFICANT CHANGE UP (ref 7–18)
CALCIUM SERPL-MCNC: 8.7 MG/DL — SIGNIFICANT CHANGE UP (ref 8.4–10.5)
CHLORIDE SERPL-SCNC: 103 MMOL/L — SIGNIFICANT CHANGE UP (ref 96–108)
CO2 SERPL-SCNC: 25 MMOL/L — SIGNIFICANT CHANGE UP (ref 22–31)
CREAT SERPL-MCNC: 0.75 MG/DL — SIGNIFICANT CHANGE UP (ref 0.5–1.3)
EGFR: 107 ML/MIN/1.73M2 — SIGNIFICANT CHANGE UP
GLUCOSE SERPL-MCNC: 85 MG/DL — SIGNIFICANT CHANGE UP (ref 70–99)
HCT VFR BLD CALC: 41.4 % — SIGNIFICANT CHANGE UP (ref 39–50)
HGB BLD-MCNC: 14.4 G/DL — SIGNIFICANT CHANGE UP (ref 13–17)
HOMOCYSTEINE LEVEL: 17.4 UMOL/L — HIGH
MCHC RBC-ENTMCNC: 34.8 GM/DL — SIGNIFICANT CHANGE UP (ref 32–36)
MCHC RBC-ENTMCNC: 35 PG — HIGH (ref 27–34)
MCV RBC AUTO: 100.5 FL — HIGH (ref 80–100)
METHYLMALONIC ACID LEVEL: 118 NMOL/L — SIGNIFICANT CHANGE UP (ref 87–318)
NRBC # BLD: 0 /100 WBCS — SIGNIFICANT CHANGE UP (ref 0–0)
PLATELET # BLD AUTO: 66 K/UL — LOW (ref 150–400)
POTASSIUM SERPL-MCNC: 3.5 MMOL/L — SIGNIFICANT CHANGE UP (ref 3.5–5.3)
POTASSIUM SERPL-SCNC: 3.5 MMOL/L — SIGNIFICANT CHANGE UP (ref 3.5–5.3)
PROT SERPL-MCNC: 6.5 G/DL — SIGNIFICANT CHANGE UP (ref 6–8.3)
RBC # BLD: 4.12 M/UL — LOW (ref 4.2–5.8)
RBC # FLD: 11.3 % — SIGNIFICANT CHANGE UP (ref 10.3–14.5)
SODIUM SERPL-SCNC: 137 MMOL/L — SIGNIFICANT CHANGE UP (ref 135–145)
WBC # BLD: 6.08 K/UL — SIGNIFICANT CHANGE UP (ref 3.8–10.5)
WBC # FLD AUTO: 6.08 K/UL — SIGNIFICANT CHANGE UP (ref 3.8–10.5)

## 2022-03-17 PROCEDURE — 99233 SBSQ HOSP IP/OBS HIGH 50: CPT

## 2022-03-17 RX ORDER — PREGABALIN 225 MG/1
1000 CAPSULE ORAL
Refills: 0 | Status: DISCONTINUED | OUTPATIENT
Start: 2022-03-17 | End: 2022-03-24

## 2022-03-17 RX ADMIN — Medication 105 MILLIGRAM(S): at 14:38

## 2022-03-17 RX ADMIN — Medication 2 MILLIGRAM(S): at 11:59

## 2022-03-17 RX ADMIN — Medication 2 MILLIGRAM(S): at 21:09

## 2022-03-17 RX ADMIN — Medication 2 MILLIGRAM(S): at 03:56

## 2022-03-17 RX ADMIN — Medication 2 MILLIGRAM(S): at 20:21

## 2022-03-17 RX ADMIN — Medication 105 MILLIGRAM(S): at 05:47

## 2022-03-17 RX ADMIN — Medication 50 MILLIGRAM(S): at 14:29

## 2022-03-17 RX ADMIN — Medication 1 MILLIGRAM(S): at 12:00

## 2022-03-17 RX ADMIN — Medication 2 MILLIGRAM(S): at 14:37

## 2022-03-17 RX ADMIN — Medication 2 MILLIGRAM(S): at 23:10

## 2022-03-17 RX ADMIN — Medication 2 MILLIGRAM(S): at 16:16

## 2022-03-17 RX ADMIN — PREGABALIN 1000 MICROGRAM(S): 225 CAPSULE ORAL at 18:25

## 2022-03-17 RX ADMIN — PANTOPRAZOLE SODIUM 40 MILLIGRAM(S): 20 TABLET, DELAYED RELEASE ORAL at 05:47

## 2022-03-17 RX ADMIN — Medication 50 MILLIGRAM(S): at 05:46

## 2022-03-17 RX ADMIN — Medication 2 MILLIGRAM(S): at 17:44

## 2022-03-17 NOTE — PROGRESS NOTE ADULT - SUBJECTIVE AND OBJECTIVE BOX
Patient is a 56y old  Male who presents with a chief complaint of Weakness / Ataxia / Possible alcohol intoxication (14 Mar 2022 13:04)    OVERNIGHT EVENTS: no acute change.  downgraded from ICU    REVIEW OF SYSTEMS:  CONSTITUTIONAL: No fever, chills  NECK: No pain or stiffness  RESPIRATORY: No cough, SOB  CARDIOVASCULAR: No chest pain, palpitations  GASTROINTESTINAL: No abdominal pain. No nausea, vomiting, or diarrhea  GENITOURINARY: No dysuria  NEUROLOGICAL: No HA  SKIN: No itching, burning, rashes, or lesions   MUSCULOSKELETAL: No joint pain or swelling; No muscle, back, or extremity pain    Vital Signs Last 24 Hrs  T(C): 36.4 (17 Mar 2022 14:05), Max: 37.5 (16 Mar 2022 21:56)  T(F): 97.5 (17 Mar 2022 14:05), Max: 99.5 (16 Mar 2022 21:56)  HR: 94 (17 Mar 2022 14:05) (79 - 96)  BP: 118/72 (17 Mar 2022 14:05) (118/72 - 138/79)  BP(mean): 88 (16 Mar 2022 16:00) (87 - 88)  RR: 18 (17 Mar 2022 14:05) (18 - 20)  SpO2: 97% (17 Mar 2022 14:05) (97% - 100%)      MEDICATIONS  (STANDING):  chlordiazePOXIDE   Oral   chlordiazePOXIDE 50 milliGRAM(s) Oral every 8 hours  dextrose 5%. 1000 milliLiter(s) (75 mL/Hr) IV Continuous <Continuous>  folic acid 1 milliGRAM(s) Oral daily  influenza   Vaccine 0.5 milliLiter(s) IntraMuscular once  LORazepam   Injectable 2 milliGRAM(s) IV Push once  pantoprazole    Tablet 40 milliGRAM(s) Oral before breakfast  thiamine IVPB 500 milliGRAM(s) IV Intermittent every 8 hours    MEDICATIONS  (PRN):  LORazepam   Injectable 2 milliGRAM(s) IV Push every 2 hours PRN CIWA-Ar score increase by 2 points and a total score of 7 or less        PHYSICAL EXAM:  GENERAL:  mildly anxious,  No acute distress  EYES: clear conjunctiva  ENMT: Moist mucous membranes  NECK: Supple, No JVD  CHEST/LUNG: Clear to auscultation bilaterally; No rales, rhonchi, wheezing, or rubs  HEART: S1, S2, Regular rate and rhythm  ABDOMEN: Soft, Nontender, Nondistended; Bowel sounds present  NEURO: Alert & Oriented X3, dec sensation b/l LE   EXTREMITIES: + mild tremors to bilateral hands. No LE edema, no calf tenderness  SKIN: No rashes or lesions    Consultant(s) Notes Reviewed:  [x ] YES  [ ] NO  Care Discussed with Consultants/Other Providers [ x] YES  [ ] NO                          14.4   6.08  )-----------( 66       ( 17 Mar 2022 08:02 )             41.4       03-17    137  |  103  |  8   ----------------------------<  85  3.5   |  25  |  0.75    Ca    8.7      17 Mar 2022 08:02  Phos  3.8     03-16  Mg     2.0     03-16    TPro  6.5  /  Alb  3.0<L>  /  TBili  1.6<H>  /  DBili  0.4<H>  /  AST  54<H>  /  ALT  64<H>  /  AlkPhos  80  03-17      CAPILLARY BLOOD GLUCOSE  POCT Blood Glucose.: 96 mg/dL (16 Mar 2022 16:47)          RADIOLOGY & ADDITIONAL TESTS:  < from: MR Thoracic Spine No Cont (03.14.22 @ 14:04) >    ACC: [id number] EXAM:  MR SPINE LUMBAR                        ACC: [id number] EXAM:  MR SPINE THORACIC                          PROCEDURE DATE:  03/14/2022          INTERPRETATION:  MR thoracic and lumbar without gadolinium    CLINICAL INDICATION: Lower extremity weakness and decreased sensation    TECHNIQUE:   Sagittal  and axial T1-weighted images, sagittal STIR   images,  and sagittal and axial T2-weighted images of the thoracic spine   were obtained.    FINDINGS:   No prior similar studies are available for review.    Thoracic and lumbar vertebral alignment is significant for a slightly   exaggerated thoracic kyphosis.  Thoracic and lumbar vertebral body   heights are maintained.  Marrow signal intensity within thoracic and   lumbar vertebral bodies and posterior elements is unremarkable.  No   osseous expansion, epidural disease or paraspinal abnormality is found.    Thoracic and lumbar intervertebral discs show disc degeneration and   spondylosis at T6-7 through T11-T12 as well as L3-4 through L5-S1 with   loss of disc height and signal within the nucleus pulposus. Disc bulges   are noted at these levels which minimally flatten the ventral thecal sac   disc herniation noted arachnoid space.    The thoracic cord maintains intact morphology.  Thoracic cord signal   intensity is intact. The conus medullaris terminates at T12.      IMPRESSION: Moderate disc degeneration and spondylosis at T6-7 through   T11-T12 as well as L3-4 through L5-S1 with loss of disc height and signal   within the nucleus pulposus. Disc bulges are noted at these levels which   minimally flatten the ventral thecal sac disc herniation noted arachnoid   space.      --- End of Report ---            KELL NGO MD; Attending Radiologist  This document has been electronically signed. Mar 14 2022  2:45PM    < end of copied text >  < from: US Abdomen Upper Quadrant Right (03.14.22 @ 15:40) >  ACC: [id number] EXAM:  US ABDOMEN RT UPR QUADRANT                          PROCEDURE DATE:  03/14/2022          INTERPRETATION:  CLINICAL INFORMATION: Elevated liver enzymes    COMPARISON: None available.    TECHNIQUE: Sonography of the right upper quadrant.    FINDINGS:    Liver: Increased echogenicity. Enlarged (17.9 cm). Patent main portal   vein with normal flow direction.  Bile ducts: Normal caliber. Common bile duct measures 4.6 mm.  Gallbladder: Within normal limits.  Pancreas: Visualized portions are within normal limits.  Right kidney: 10.8 cm. No hydronephrosis.  Ascites: None.  IVC: Visualized portions are within normal limits.    IMPRESSION:    Hepatic steatosis.  No cholelithiasis or biliary ductal dilatation.        --- End of Report ---            ELIE BAILEY MD; Attending Radiologist  This document has been electronically signed. Mar 14 2022  3:41PM    < end of copied text >  < from: Xray Chest 1 View-PORTABLE IMMEDIATE (Xray Chest 1 View-PORTABLE IMMEDIATE .) (03.13.22 @ 21:06) >    ACC: [id number] EXAM:  XR CHEST PORTABLE IMMED 1V                          PROCEDURE DATE:  03/13/2022          INTERPRETATION:  Unsteady gait.    AP chest.    Normal heart mediastinum. No consolidation or effusion. Elevation right   hemidiaphragm.    IMPRESSION: No active infiltrates.    --- End of Report ---            ARMAND DELGADO MD; Attending Radiologist  This document has been electronically signed. Mar 14 2022  9:47AM    < end of copied text >  < from: CT Head No Cont (03.13.22 @ 14:35) >  ACC: [id number] EXAM:  CT BRAIN                          PROCEDURE DATE:  03/13/2022          INTERPRETATION:  CT HEAD  HEAD CT    INDICATIONS: headache and leg numbness, brother called 911 because he   found the patient on the floor and the patient was having trouble moving.   reports headache that began approximately 1 hour prior to arrival. no   changes in vision. has had ongoing numbness in his legs for months, as   well as chest pain and shortness of breath. reports he last drink alcohol   last night, 2 glasses of wine. patient has been complaining of similar   symptoms for several months.    TECHNIQUE:    HEAD CT:  Serial axial images were obtained from the skull base to the vertex   without the use of intravenous contrast.    COMPARISON EXAMINATION: None.    FINDINGS:    HEAD CT:    VENTRICLES AND SULCI: Ventricles and sulci are unremarkable for patient   age.  INTRA-AXIAL: No intracranial mass, acute hemorrhage, or midline shift is   present. There is non-specific decreased attenuation in the white matter   likely related to sequelae of microvascular disease.  EXTRA-AXIAL: No extra-axial fluid collection is present.  INTRACRANIAL HEMORRHAGE: None.    VISUALIZED SINUSES: No air-fluid levels are identified.  VISUALIZED MASTOIDS: Clear.  CALVARIUM:  Intact.  MISCELLANEOUS:  None.    SOFT TISSUES: Unremarkable.  BONES: Unremarkable.      IMPRESSION:    HEAD CT: No acute hemorrhage or midline shift.    --- End of Report ---            MICHAEL AGUIRRE MD; Attending Radiologist  This document has been electronically signed. Mar 13 2022  3:03PM    < end of copied text >      Imaging Personally Reviewed:  [ ] YES  [ ] NO

## 2022-03-17 NOTE — PROGRESS NOTE ADULT - ATTENDING COMMENTS
56 year old man with PMH of chronic alcohol abuse here with acute onset difficulty ambulating with generalized weakness. He was found on the floor by family weak. No LOC, no focal weakness, no GI symptoms. Hospital course c/b delirium tremens and was transferred to ICU, now downgraded to F.     Patient's CIWA was 5 this am.     labs reviewed.     P/E as above     A/P:  #Alcohol withdrawal   # Thrombocytopenia   # Transaminitis   #Bilateral lower extremity weakness  #Wernicke's Encephalopathy     Plan:  -Patient's last CIWA was 5 this am, will c/w librium taper. CIWA protocol. Folic acid, thiamine, multi-vitamin.   - 56 year old man with PMH of chronic alcohol abuse here with acute onset difficulty ambulating with generalized weakness. He was found on the floor by family weak. No LOC, no focal weakness, no GI symptoms. Hospital course c/b delirium tremens and was transferred to ICU, now downgraded to F.     Patient's CIWA was 5 this am.     labs reviewed.     P/E as above     A/P:  #Alcohol withdrawal   # Thrombocytopenia   # Transaminitis   #Bilateral lower extremity weakness  #Wernicke's Encephalopathy     Plan:  -Patient's last CIWA was 5 this am, will c/w librium taper. CIWA protocol. Folic acid, thiamine, multi-vitamin.   -Thrombocytopenia likely 2/2 alcohol use. Plt count improved since yesterday   -Mild transaminitis, US abd with hepatic steatosis.   -Patient with b/l peripheral neuropathy, B12 394, with elevated homocysteine levels and normal folic acid. Will give Vitamin B12. Neuro f/u 56 year old man with PMH of chronic alcohol abuse here with acute onset difficulty ambulating with generalized weakness. He was found on the floor by family weak. No LOC, no focal weakness, no GI symptoms. Hospital course c/b delirium tremens and was transferred to ICU, now downgraded to F.     Patient's CIWA was 5 this am. He was agitated this afternoon, requiring 6mg ativan PRN since morning.     labs reviewed.     P/E as above     A/P:  #Alcohol withdrawal   # Thrombocytopenia   # Transaminitis   #Bilateral lower extremity weakness  #Wernicke's Encephalopathy     Plan:  -Patient's last CIWA was 5 this am, will switch to ativan taper and PRN symptom triggered ativan. CIWA protocol. Folic acid, thiamine, multi-vitamin.   -Thrombocytopenia likely 2/2 alcohol use. Plt count improved since yesterday   -Mild transaminitis, US abd with hepatic steatosis.   -Patient with b/l peripheral neuropathy, B12 394, with elevated homocysteine levels and normal folic acid. Will give Vitamin B12. Neuro f/u 56 year old man with PMH of chronic alcohol abuse here with acute onset difficulty ambulating with generalized weakness. He was found on the floor by family weak. No LOC, no focal weakness, no GI symptoms. Hospital course c/b delirium tremens and was transferred to ICU, now downgraded to F.     Patient's CIWA was 5 this am. He was agitated this afternoon, requiring 6mg ativan PRN since morning.     labs reviewed.     P/E as above     A/P:  #Alcohol withdrawal   # Thrombocytopenia   # Transaminitis   #Bilateral lower extremity weakness  #Wernicke's Encephalopathy     Plan:  -Patient CIWA 13 this afternoon, was agitated. Will switch to ativan taper, may need slow taper and PRN symptom triggered ativan. If does not improve, may need ICU re-eval. [facility] protocol. Folic acid, thiamine, multi-vitamin.   -Thrombocytopenia likely 2/2 alcohol use. Plt count improved since yesterday   -Mild transaminitis, US abd with hepatic steatosis.   -Patient with b/l peripheral neuropathy, B12 394, with elevated homocysteine levels and normal folic acid. Will give Vitamin B12. Neuro f/u

## 2022-03-17 NOTE — SBIRT NOTE ADULT - NSSBIRTBRIEFINTDET_GEN_A_CORE
SW educated patient on the harms of excessive alcohol use. Patient reported he only drinks two cups a wine 3-4 times a week. Patient does not view his alcohol use as problematic and declined any sort of substance abuse resources.

## 2022-03-17 NOTE — PROGRESS NOTE ADULT - PROBLEM SELECTOR PLAN 2
-  Patient has hx of alcoholic use   -  Unknown how much he drinks  -  Alcohol level of 23 on admission, urine drug screen negative  -  continue CIWA protocol with IV ativan 2mg standing and PRN  -  Follow up with  social work for ETOH resources -  Patient has hx of alcoholic use   -  Unknown how much he drinks  -  Alcohol level of 23 on admission, urine drug screen negative  -  continue CIWA protocol with IV ativan 2mg standing and PRN  -  Librium taper switched to ativan taper this afternoon for increased agitation and aggression towards staff.  s/p code grey  -  Follow up with  social work for ETOH resources

## 2022-03-17 NOTE — PROGRESS NOTE ADULT - PROBLEM SELECTOR PLAN 1
-  Patient with hx of weakness was brought after he was found on floor.  -  Patient is complaining of ambulatory dysfunction and sensory deficits from toes to umbilical level ( fine touch and pressure)  -  CT head didn't show any acute deficits or hemorrhage.  -  MRI thoracic and lumbar showed Moderate disc degeneration and spondylosis at T6-7 through T11-T12 as well as L3-4 through L5-S1 with loss of disc height and signal   within the nucleus pulposus. Disc bulges are noted at these levels which minimally flatten the ventral thecal sac disc herniation noted arachnoid   space.  -  start thiamine 500 mg IV daily x 3 days  -  then start thiamine 100mg PO daily  -  continue folic acid 1 mg daily  -   Neurology Dr. Tyler consulted.

## 2022-03-17 NOTE — PROGRESS NOTE ADULT - ASSESSMENT
57 y/o male with past medical history of chronic alcohol use was brought to ED by family member after he was found on floor in home today afternoon. Patient complaining of generalized weakness and he said "I didn't feel my legs". Patient reports that he fell many times in last month. Patient admits that he drinks alcohol occasionally and not sure when the last time he drunk. Pt was also found to have elevated liver enzymes and blood alcohol level of 23. Patient will be admitted to medicine for further management of alcohol withdrawal and generalized weakness, Neurology Dr. Tyler consulted.  CT head was negative for any acute abnormalities.   MRI thoracic and lumbar - Moderate disc degeneration and spondylosis at T6-7 through T11-T12 as well as L3-4 through L5-S1 with loss of disc height and signal   within the nucleus pulposus. Disc bulges are noted at these levels which minimally flatten the ventral thecal sac disc herniation noted arachnoid   space.  US RUQ- Hepatic steatosis. No cholelithiasis or biliary ductal dilatation.    Pt is alert, awake, appears anxious, +tremors. Denies any headache, nausea, vomiting or dizziness.

## 2022-03-17 NOTE — PROGRESS NOTE ADULT - PROBLEM SELECTOR PLAN 6
- PT recommend home with home PT when medically cleared for discharge -  PT recommend home with home PT when medically cleared for discharge  -  Patient reports living in [place] with brother,  not in [place] with ex wife,  currently unsure of where he would go when medically stable for discharge.

## 2022-03-18 PROCEDURE — 99233 SBSQ HOSP IP/OBS HIGH 50: CPT | Mod: GC

## 2022-03-18 RX ORDER — THIAMINE MONONITRATE (VIT B1) 100 MG
100 TABLET ORAL DAILY
Refills: 0 | Status: DISCONTINUED | OUTPATIENT
Start: 2022-03-18 | End: 2022-03-28

## 2022-03-18 RX ADMIN — Medication 50 MILLIGRAM(S): at 21:57

## 2022-03-18 RX ADMIN — Medication 100 MILLIGRAM(S): at 11:55

## 2022-03-18 RX ADMIN — Medication 2 MILLIGRAM(S): at 11:26

## 2022-03-18 RX ADMIN — Medication 1 MILLIGRAM(S): at 11:54

## 2022-03-18 RX ADMIN — Medication 2 MILLIGRAM(S): at 03:16

## 2022-03-18 RX ADMIN — Medication 2 MILLIGRAM(S): at 10:04

## 2022-03-18 RX ADMIN — Medication 2 MILLIGRAM(S): at 01:18

## 2022-03-18 RX ADMIN — Medication 1 TABLET(S): at 11:55

## 2022-03-18 RX ADMIN — Medication 2 MILLIGRAM(S): at 05:09

## 2022-03-18 NOTE — PROGRESS NOTE ADULT - ATTENDING COMMENTS
Assessment:  1. Alcohol withdrawal syndrome  2. Thrombocytopenia   3. Transaminitis   4. Bilateral lower extremity weakness  5. Wernicke's Encephalopathy     Plan:  - Re-evaluated patient at bedside, calm not in distress. Oriented to self  - Recommend to reinitiate Librium standing with PRN ativan based on CIWA > 7  - Aspiration and seizure precautions  - Thiamine and folate supplementation  - Oral diet  - Monitor for signs of bleeding  - LFTs downtrending  - Thrombocytopenia is slowly improving  - On 1:1 observation  - Discussed with PA, will continue to monitor on medical service for now

## 2022-03-18 NOTE — PROGRESS NOTE ADULT - SUBJECTIVE AND OBJECTIVE BOX
INTERVAL HPI/OVERNIGHT EVENTS: ICU was reconsulted due to CIWA 16. On examination, CIWA noted to be 5 for disorientation to place, mild anxiety, mild agitation and pins/needle sensation on both legs. Pt cooperating and responding adequately to questions. Currently pt on ativan taper and prn ativan. Pt got last ativan dose at 5 AM. Pt total got 12 mg ativan in last 24 hrs which was mostly prn ativan.     PRESSORS: [ ] YES [ ] NO  WHICH:    ANTIBIOTICS:                  DATE STARTED:  ANTIBIOTICS:                  DATE STARTED:  ANTIBIOTICS:                  DATE STARTED:    Antimicrobial:    Cardiovascular:    Pulmonary:    Hematalogic:    Other:  cyanocobalamin Injectable 1000 MICROGram(s) IntraMuscular <User Schedule>  dextrose 5%. 1000 milliLiter(s) IV Continuous <Continuous>  folic acid 1 milliGRAM(s) Oral daily  influenza   Vaccine 0.5 milliLiter(s) IntraMuscular once  LORazepam   Injectable 2 milliGRAM(s) IV Push every 2 hours PRN  LORazepam   Injectable   IV Push   LORazepam   Injectable 2 milliGRAM(s) IV Push every 4 hours  LORazepam   Injectable 1.5 milliGRAM(s) IV Push every 4 hours  multivitamin 1 Tablet(s) Oral daily  pantoprazole    Tablet 40 milliGRAM(s) Oral before breakfast    cyanocobalamin Injectable 1000 MICROGram(s) IntraMuscular <User Schedule>  dextrose 5%. 1000 milliLiter(s) IV Continuous <Continuous>  folic acid 1 milliGRAM(s) Oral daily  influenza   Vaccine 0.5 milliLiter(s) IntraMuscular once  LORazepam   Injectable 2 milliGRAM(s) IV Push every 2 hours PRN  LORazepam   Injectable   IV Push   LORazepam   Injectable 2 milliGRAM(s) IV Push every 4 hours  LORazepam   Injectable 1.5 milliGRAM(s) IV Push every 4 hours  multivitamin 1 Tablet(s) Oral daily  pantoprazole    Tablet 40 milliGRAM(s) Oral before breakfast    Drug Dosing Weight  Height (cm): 188 (13 Mar 2022 13:38)  Weight (kg): 91.3 (15 Mar 2022 00:00)  BMI (kg/m2): 25.8 (15 Mar 2022 00:00)  BSA (m2): 2.18 (15 Mar 2022 00:00)    CENTRAL LINE: [ ] YES [ ] NO  LOCATION:   DATE INSERTED:  REMOVE: [ ] YES [ ] NO  EXPLAIN:    BURNETT: [ ] YES [ ] NO    DATE INSERTED:  REMOVE:  [ ] YES [ ] NO  EXPLAIN:    A-LINE:  [ ] YES [ ] NO  LOCATION:   DATE INSERTED:  REMOVE:  [ ] YES [ ] NO  EXPLAIN:    PMH -reviewed admission note, no change since admission  PAST MEDICAL & SURGICAL HISTORY:  Alcohol abuse    H/O weakness        ICU Vital Signs Last 24 Hrs  T(C): 36.4 (18 Mar 2022 05:01), Max: 36.6 (17 Mar 2022 16:33)  T(F): 97.6 (18 Mar 2022 05:01), Max: 97.8 (17 Mar 2022 16:33)  HR: 96 (18 Mar 2022 05:01) (94 - 107)  BP: 132/80 (18 Mar 2022 05:01) (118/72 - 153/76)  BP(mean): --  ABP: --  ABP(mean): --  RR: 18 (18 Mar 2022 05:01) (18 - 20)  SpO2: 97% (18 Mar 2022 05:01) (97% - 99%)            03-17 @ 07:01  -  03-18 @ 07:00  --------------------------------------------------------  IN: 0 mL / OUT: 750 mL / NET: -750 mL            PHYSICAL EXAM:    GENERAL:  mildly anxious,  No acute distress  EYES: clear conjunctiva  ENMT: Moist mucous membranes  NECK: Supple, No JVD  CHEST/LUNG: Clear to auscultation bilaterally; No rales, rhonchi, wheezing, or rubs  HEART: S1, S2, Regular rate and rhythm  ABDOMEN: Soft, Nontender, Nondistended; Bowel sounds present  NEURO: Alert & Oriented X2, decreased sensation b/l LE   EXTREMITIES: + mild tremors to bilateral hands. No LE edema, no calf tenderness  SKIN: No rashes or lesions        LABS:  CBC Full  -  ( 17 Mar 2022 08:02 )  WBC Count : 6.08 K/uL  RBC Count : 4.12 M/uL  Hemoglobin : 14.4 g/dL  Hematocrit : 41.4 %  Platelet Count - Automated : 66 K/uL  Mean Cell Volume : 100.5 fl  Mean Cell Hemoglobin : 35.0 pg  Mean Cell Hemoglobin Concentration : 34.8 gm/dL  Auto Neutrophil # : x  Auto Lymphocyte # : x  Auto Monocyte # : x  Auto Eosinophil # : x  Auto Basophil # : x  Auto Neutrophil % : x  Auto Lymphocyte % : x  Auto Monocyte % : x  Auto Eosinophil % : x  Auto Basophil % : x    03-17    137  |  103  |  8   ----------------------------<  85  3.5   |  25  |  0.75    Ca    8.7      17 Mar 2022 08:02    TPro  6.5  /  Alb  3.0<L>  /  TBili  1.6<H>  /  DBili  0.4<H>  /  AST  54<H>  /  ALT  64<H>  /  AlkPhos  80  03-17            RADIOLOGY & ADDITIONAL STUDIES REVIEWED:  ***    [ ]GOALS OF CARE DISCUSSION WITH PATIENT/FAMILY/PROXY:    CRITICAL CARE TIME SPENT: 35 minutes

## 2022-03-18 NOTE — PROGRESS NOTE ADULT - ATTENDING COMMENTS
Patient seen/evaluated at bedside on 3/18/2022. I agree with the resident progress note/outlined plan of care. My independent findings and conclusions are documented.    57 y/o male with alcohol dependence p/w withdrawal syndrome requiring ICU management including phenobarbital, IV thiamine. Today agitated, restless with CIWA score of 16- triggering ICU consult. Had previously been on librium but now on ativan. At time of my assessment however, the patient was quite somnolent.    1. Complicated alcohol withdrawal syndrome with   2. delirium tremens  3. Toxic-Metabolic encephalopathy with Wernicke's  4. thrombocytopenia-improving  5. transaminitis- improving    better symptom control with librium. After several hours of agitation, now somnolent likely in setting of frequent ativan stacking  for now switch to librium though hold in current state for sedation. Start with librium taper of q 8 hours though may adjust to less frequent administration based on clinical response. Continue with 1:1 safety observer  -prn ativan per ciwa score  s/p 3 day course of IV thiamine in the ICU  out of bed to chair

## 2022-03-18 NOTE — PROGRESS NOTE ADULT - SUBJECTIVE AND OBJECTIVE BOX
HPI:  Patient is 56 years old male with past medical history of chronic alcohol use was brought to ED by family member after he was found on floor in home today afternoon. during Examination, Patient was AAOx1-2 but could be due to Ativan he got in ED. Attempted to call Ck but didn't answer. So information was obtained from charts. Patient has been complaining of generalized weakness and he said "I didn't feel my legs". Patient reports that he fell many times in last month. Patient admits that he drinks alcohol occasionally and not sure when the last time he drunk. On Examination; Patient had sensory deficits but able to move his extremities. Patient is able to follow commands. Patient denied leg pain, recent trauma except falls, chest pain, abdominal pain, N/V or change in bowel movements.  (13 Mar 2022 20:55)    Patient is a 56y old  Male who presents with a chief complaint of Weakness / Ataxia / Possible alcohol intoxication (18 Mar 2022 09:50). Patient seen, lethargic, NAD.    INTERVAL HPI/OVERNIGHT EVENTS:  T(C): 36.4 (03-18-22 @ 05:01), Max: 36.5 (03-17-22 @ 20:50)  HR: 96 (03-18-22 @ 05:01) (96 - 100)  BP: 132/80 (03-18-22 @ 05:01) (132/80 - 153/76)  RR: 18 (03-18-22 @ 05:01) (18 - 18)  SpO2: 97% (03-18-22 @ 05:01) (97% - 97%)  Wt(kg): --  I&O's Summary    17 Mar 2022 07:01  -  18 Mar 2022 07:00  --------------------------------------------------------  IN: 0 mL / OUT: 750 mL / NET: -750 mL        REVIEW OF SYSTEMS: denies fever, chills, SOB, palpitations, chest pain, abdominal pain, nausea, vomitting, diarrhea, constipation, dizziness    MEDICATIONS  (STANDING):  chlordiazePOXIDE 50 milliGRAM(s) Oral every 8 hours  cyanocobalamin Injectable 1000 MICROGram(s) IntraMuscular <User Schedule>  dextrose 5%. 1000 milliLiter(s) (75 mL/Hr) IV Continuous <Continuous>  folic acid 1 milliGRAM(s) Oral daily  influenza   Vaccine 0.5 milliLiter(s) IntraMuscular once  multivitamin 1 Tablet(s) Oral daily  pantoprazole    Tablet 40 milliGRAM(s) Oral before breakfast  thiamine 100 milliGRAM(s) Oral daily    MEDICATIONS  (PRN):  LORazepam     Tablet 0.5 milliGRAM(s) Oral every 4 hours PRN Agitation      PHYSICAL EXAM:  GENERAL: NAD  HEAD:  Atraumatic, Normocephalic  EYES: EOMI  ENMT: Nares patent  NECK: Supple  NERVOUS SYSTEM:  N focal deficits  CHEST/LUNG: CTA  HEART: S1, S2  ABDOMEN: Soft, Nontender  EXTREMITIES: mild tremors BL hands  LYMPH: No lymphadenopathy noted  SKIN: No rashes or lesions  LABS:                        14.4   6.08  )-----------( 66       ( 17 Mar 2022 08:02 )             41.4     03-17    137  |  103  |  8   ----------------------------<  85  3.5   |  25  |  0.75    Ca    8.7      17 Mar 2022 08:02    TPro  6.5  /  Alb  3.0<L>  /  TBili  1.6<H>  /  DBili  0.4<H>  /  AST  54<H>  /  ALT  64<H>  /  AlkPhos  80  03-17        CAPILLARY BLOOD GLUCOSE

## 2022-03-18 NOTE — PROGRESS NOTE ADULT - ASSESSMENT
55 y/o male with past medical history of chronic alcohol use was brought to ED by family member after he was found on floor in home today afternoon. Patient complaining of generalized weakness and he said "I didn't feel my legs". Patient reports that he fell many times in last month. Patient admits that he drinks alcohol occasionally and not sure when the last time he drunk. Pt was also found to have elevated liver enzymes and blood alcohol level of 23. Patient will be admitted to medicine for further management of alcohol withdrawal and generalized weakness, Neurology Dr. Tyler consulted.  CT head was negative for any acute abnormalities.   MRI thoracic and lumbar - Moderate disc degeneration and spondylosis at T6-7 through T11-T12 as well as L3-4 through L5-S1 with loss of disc height and signal   within the nucleus pulposus. Disc bulges are noted at these levels which minimally flatten the ventral thecal sac disc herniation noted arachnoid   space.  US RUQ- Hepatic steatosis. No cholelithiasis or biliary ductal dilatation.    Pt is alert, awake, appears anxious, +tremors. Denies any headache, nausea, vomiting or dizziness.    55 y/o male with past medical history of chronic alcohol use was brought to ED by family member after he was found on floor in home today afternoon. Patient complaining of generalized weakness and he said "I didn't feel my legs". Patient reports that he fell many times in last month. Patient admits that he drinks alcohol occasionally and not sure when the last time he drunk. Pt was also found to have elevated liver enzymes and blood alcohol level of 23. Patient will be admitted to medicine for further management of alcohol withdrawal and generalized weakness, Neurology Dr. Tyler consulted.  CT head was negative for any acute abnormalities.   MRI thoracic and lumbar - Moderate disc degeneration and spondylosis at T6-7 through T11-T12 as well as L3-4 through L5-S1 with loss of disc height and signal   within the nucleus pulposus. Disc bulges are noted at these levels which minimally flatten the ventral thecal sac disc herniation noted arachnoid   space.  US RUQ- Hepatic steatosis. No cholelithiasis or biliary ductal dilatation.    Pt is alert, awake, appears anxious, +tremors. Denies any headache, nausea, vomiting or dizziness.     3/18 Patient back on Librium, more calm.

## 2022-03-18 NOTE — PROGRESS NOTE ADULT - PROBLEM SELECTOR PLAN 4
-  Likely due to alcohol use and liver problems   -  No bleeding noted, plts 66 today (3/18/22)  -  Will continue to monitor

## 2022-03-18 NOTE — PROGRESS NOTE ADULT - ASSESSMENT
57yo M with PMH of chronic alcohol  p/w acute onset difficulty ambulating with generalized weakness. Pt admitted for Alcohol withdrawal. Patient was downgraded from ICU 2 days ago where he received Precedex gtt. ICU reconsulted due to high CIWA score. On examination, CIWA noted to be 5 for disorientation to place, mild anxiety, mild agitation and pins/needle sensation on both legs. Pt cooperating and responding adequately to questions. Currently pt on ativan taper and prn ativan. Pt got last ativan dose at 5 AM. Pt total got 12 mg ativan in last 24 hrs which was mostly prn ativan.    Assessment:  Alcohol withdrawal   Moderate disc degeneration and spondylosis  Wernicke's Encephalopathy   Peripheral neuropathy         Plan  =====================[CNS ]==============================  # Alcohol withdrawal  -CIWA 5 for disorientation to place, mild anxiety, mild agitation and pins/needle sensation on both legs.  - s/p 12 mg Ativan over last 24 hours  - pt on ativan tapering dose and 2q2 PRN   - Downgraded from ICU 2 days ago where he was on Precedex gtt  - Monitor electrolytes and replace as needed     #Peripheral neuropathy  - MR Thoracic and lumbar spine: Moderate disc degeneration and spondylosis at T6-7 through   T11-T12 as well as L3-4 through L5-S1 with loss of disc height and signal within the nucleus pulposus. Disc bulges are noted at these levels which minimally flatten the ventral thecal sac disc herniation noted arachnoid space.  -Neuro consulted         =====================[CVS ]===============================  #No issues   =====================[RESP ]==============================  # No issues   - currently on RA     =====================[ GI ]================================  # Mild Alcohol Liver disease   - Monitor LFT  - avoid hepatotoxic meds      ====================[ RENAL ]=============================   # No issues   - monitor BMP and electrolytes       =====================[ ID ]================================  # No issues     ===================[ HEME/ONC ]===========================  # Thrombocytopenia :   - likely 2/2 chronic alcohol abuse   - no active bleeding   - monitor CBC daily     =====================[ ENDO ]=============================  # Hyperglycemia   - A1c 5.4  - target CBG < 180  - currently well controlled on HSS     ================= Skin/Catheters============================  # No active issues   - No rashes.      ==================[ PROPHYLAXIS ]==========================   # Dvt : SCD boots  # Gi : Protonix      55yo M with PMH of chronic alcohol  p/w acute onset difficulty ambulating with generalized weakness. Pt admitted for Alcohol withdrawal. Patient was downgraded from ICU 2 days ago where he received Precedex gtt. ICU reconsulted due to high CIWA score. On examination, CIWA noted to be 5 for disorientation to place, mild anxiety, mild agitation and pins/needle sensation on both legs. Pt cooperating and responding adequately to questions. Currently pt on ativan taper and prn ativan. Pt got last ativan dose at 5 AM. Pt total got 12 mg ativan in last 24 hrs which was mostly prn ativan.    Assessment:  Alcohol withdrawal   Moderate disc degeneration and spondylosis  Wernicke's Encephalopathy   Peripheral neuropathy         Plan  [CNS]  # Alcohol withdrawal  -CIWA 5 for disorientation to place, mild anxiety, mild agitation and pins/needle sensation on both legs.  - s/p 12 mg Ativan over last 24 hours  - pt on ativan tapering dose and 2q2 PRN   -Recommend starting Librium   - Downgraded from ICU 2 days ago where he was on Precedex gtt  - Monitor electrolytes and replace as needed     #Peripheral neuropathy  - MR Thoracic and lumbar spine: Moderate disc degeneration and spondylosis at T6-7 through   T11-T12 as well as L3-4 through L5-S1 with loss of disc height and signal within the nucleus pulposus. Disc bulges are noted at these levels which minimally flatten the ventral thecal sac disc herniation noted arachnoid space.  -Neuro consulted         [CVS ]  #No issues     [RESP ]  # No issues   - currently on RA     [ GI ]  # Mild Alcohol Liver disease   - Monitor LFT  - avoid hepatotoxic meds      [ RENAL ]   # No issues   - monitor BMP and electrolytes       [ ID ]  # No issues     [ HEME/ONC ]  # Thrombocytopenia :   - likely 2/2 chronic alcohol abuse   - no active bleeding   - monitor CBC daily     [ ENDO ]  # Hyperglycemia   - A1c 5.4  - target CBG < 180  - currently well controlled on HSS       [ PROPHYLAXIS ]  # Dvt : SCD boots  # Gi : Protonix

## 2022-03-18 NOTE — PROGRESS NOTE ADULT - PROBLEM SELECTOR PLAN 2
-  Patient has hx of alcoholic use   -  Unknown how much he drinks  -  Alcohol level of 23 on admission, urine drug screen negative  -  continue CIWA protocol with IV ativan 2mg standing and PRN  -  Librium taper switched to ativan taper 3/17, had CIWA 16 this am, ICU consulted, recommended switching back to Librium.  Discussed with Dr. Chau as well.  Switched back to Librium ntoday (3/18). Discussed with Dr. Muniz.   -  Follow up with  social work for ETOH resources -  Patient has hx of alcoholic use   -  Unknown how much he drinks  -  Alcohol level of 23 on admission, urine drug screen negative  -  continue CIWA protocol with IV ativan 2mg standing and PRN  -  Librium taper switched to ativan taper 3/17, had CIWA 16 this am, ICU consulted, recommended switching back to Librium.  Discussed with Dr. Chau as well.  Switched back to Librium today (3/18). Discussed with Dr. Muniz.   -  Follow up with  social work for ETOH resources

## 2022-03-18 NOTE — PROGRESS NOTE ADULT - PROBLEM SELECTOR PLAN 6
-  PT recommend home with home PT when medically cleared for discharge  -  Patient reports living in [place] with brother,  not in [place] with ex wife,  currently unsure of where he would go when medically stable for discharge.

## 2022-03-19 LAB
ALBUMIN SERPL ELPH-MCNC: 3.2 G/DL — LOW (ref 3.5–5)
ALP SERPL-CCNC: 78 U/L — SIGNIFICANT CHANGE UP (ref 40–120)
ALT FLD-CCNC: 61 U/L DA — HIGH (ref 10–60)
ANION GAP SERPL CALC-SCNC: 14 MMOL/L — SIGNIFICANT CHANGE UP (ref 5–17)
AST SERPL-CCNC: 47 U/L — HIGH (ref 10–40)
BILIRUB SERPL-MCNC: 1.4 MG/DL — HIGH (ref 0.2–1.2)
BUN SERPL-MCNC: 8 MG/DL — SIGNIFICANT CHANGE UP (ref 7–18)
CALCIUM SERPL-MCNC: 8.9 MG/DL — SIGNIFICANT CHANGE UP (ref 8.4–10.5)
CHLORIDE SERPL-SCNC: 108 MMOL/L — SIGNIFICANT CHANGE UP (ref 96–108)
CO2 SERPL-SCNC: 19 MMOL/L — LOW (ref 22–31)
CREAT SERPL-MCNC: 0.79 MG/DL — SIGNIFICANT CHANGE UP (ref 0.5–1.3)
EGFR: 105 ML/MIN/1.73M2 — SIGNIFICANT CHANGE UP
GLUCOSE SERPL-MCNC: 74 MG/DL — SIGNIFICANT CHANGE UP (ref 70–99)
HCT VFR BLD CALC: 42.1 % — SIGNIFICANT CHANGE UP (ref 39–50)
HGB BLD-MCNC: 14.6 G/DL — SIGNIFICANT CHANGE UP (ref 13–17)
MCHC RBC-ENTMCNC: 34.7 GM/DL — SIGNIFICANT CHANGE UP (ref 32–36)
MCHC RBC-ENTMCNC: 34.7 PG — HIGH (ref 27–34)
MCV RBC AUTO: 100 FL — SIGNIFICANT CHANGE UP (ref 80–100)
NRBC # BLD: 0 /100 WBCS — SIGNIFICANT CHANGE UP (ref 0–0)
PLATELET # BLD AUTO: 107 K/UL — LOW (ref 150–400)
POTASSIUM SERPL-MCNC: 3.8 MMOL/L — SIGNIFICANT CHANGE UP (ref 3.5–5.3)
POTASSIUM SERPL-SCNC: 3.8 MMOL/L — SIGNIFICANT CHANGE UP (ref 3.5–5.3)
PROT SERPL-MCNC: 6.8 G/DL — SIGNIFICANT CHANGE UP (ref 6–8.3)
RBC # BLD: 4.21 M/UL — SIGNIFICANT CHANGE UP (ref 4.2–5.8)
RBC # FLD: 11.3 % — SIGNIFICANT CHANGE UP (ref 10.3–14.5)
SODIUM SERPL-SCNC: 141 MMOL/L — SIGNIFICANT CHANGE UP (ref 135–145)
WBC # BLD: 6.58 K/UL — SIGNIFICANT CHANGE UP (ref 3.8–10.5)
WBC # FLD AUTO: 6.58 K/UL — SIGNIFICANT CHANGE UP (ref 3.8–10.5)

## 2022-03-19 PROCEDURE — 99232 SBSQ HOSP IP/OBS MODERATE 35: CPT

## 2022-03-19 RX ADMIN — Medication 25 MILLIGRAM(S): at 21:44

## 2022-03-19 RX ADMIN — Medication 1 TABLET(S): at 12:13

## 2022-03-19 RX ADMIN — Medication 1 MILLIGRAM(S): at 12:14

## 2022-03-19 RX ADMIN — PANTOPRAZOLE SODIUM 40 MILLIGRAM(S): 20 TABLET, DELAYED RELEASE ORAL at 05:33

## 2022-03-19 RX ADMIN — Medication 50 MILLIGRAM(S): at 05:32

## 2022-03-19 RX ADMIN — PREGABALIN 1000 MICROGRAM(S): 225 CAPSULE ORAL at 17:34

## 2022-03-19 RX ADMIN — Medication 100 MILLIGRAM(S): at 12:13

## 2022-03-19 RX ADMIN — Medication 25 MILLIGRAM(S): at 13:03

## 2022-03-19 NOTE — PROGRESS NOTE ADULT - PROBLEM SELECTOR PLAN 6
-  PT recommend home with home PT when medically cleared for discharge  -  Patient reports living in [place] with brother,  not in [place] with ex wife,  currently unsure of where he would go when medically stable for discharge.

## 2022-03-19 NOTE — PROGRESS NOTE ADULT - SUBJECTIVE AND OBJECTIVE BOX
HPI:  Patient is 56 years old male with past medical history of chronic alcohol use was brought to ED by family member after he was found on floor in home today afternoon. during Examination, Patient was AAOx1-2 but could be due to Ativan he got in ED. Attempted to call Ck but didn't answer. So information was obtained from charts. Patient has been complaining of generalized weakness and he said "I didn't feel my legs". Patient reports that he fell many times in last month. Patient admits that he drinks alcohol occasionally and not sure when the last time he drunk. On Examination; Patient had sensory deficits but able to move his extremities. Patient is able to follow commands. Patient denied leg pain, recent trauma except falls, chest pain, abdominal pain, N/V or change in bowel movements.  (13 Mar 2022 20:55)    Patient is a 56y old  Male who presents with a chief complaint of Weakness / Ataxia / Possible alcohol intoxication (18 Mar 2022 09:50). Patient seen, lethargic, NAD.    INTERVAL HPI/OVERNIGHT EVENTS:  T(C): 36.4 (03-18-22 @ 05:01), Max: 36.5 (03-17-22 @ 20:50)  HR: 96 (03-18-22 @ 05:01) (96 - 100)  BP: 132/80 (03-18-22 @ 05:01) (132/80 - 153/76)  RR: 18 (03-18-22 @ 05:01) (18 - 18)  SpO2: 97% (03-18-22 @ 05:01) (97% - 97%)  Wt(kg): --  I&O's Summary    17 Mar 2022 07:01  -  18 Mar 2022 07:00  --------------------------------------------------------  IN: 0 mL / OUT: 750 mL / NET: -750 mL        REVIEW OF SYSTEMS: denies fever, chills, SOB, palpitations, chest pain, abdominal pain, nausea, vomitting, diarrhea, constipation, dizziness    MEDICATIONS  (STANDING):  chlordiazePOXIDE 50 milliGRAM(s) Oral every 8 hours  cyanocobalamin Injectable 1000 MICROGram(s) IntraMuscular <User Schedule>  dextrose 5%. 1000 milliLiter(s) (75 mL/Hr) IV Continuous <Continuous>  folic acid 1 milliGRAM(s) Oral daily  influenza   Vaccine 0.5 milliLiter(s) IntraMuscular once  multivitamin 1 Tablet(s) Oral daily  pantoprazole    Tablet 40 milliGRAM(s) Oral before breakfast  thiamine 100 milliGRAM(s) Oral daily    MEDICATIONS  (PRN):  LORazepam     Tablet 0.5 milliGRAM(s) Oral every 4 hours PRN Agitation      PHYSICAL EXAM:  GENERAL: NAD  HEAD:  Atraumatic, Normocephalic  EYES: EOMI  ENMT: Nares patent  NECK: Supple  NERVOUS SYSTEM:  N focal deficits  CHEST/LUNG: CTA  HEART: S1, S2  ABDOMEN: Soft, Nontender  EXTREMITIES: mild tremors BL hands  LYMPH: No lymphadenopathy noted  SKIN: No rashes or lesions  LABS:                        14.4   6.08  )-----------( 66       ( 17 Mar 2022 08:02 )             41.4     03-17    137  |  103  |  8   ----------------------------<  85  3.5   |  25  |  0.75    Ca    8.7      17 Mar 2022 08:02    TPro  6.5  /  Alb  3.0<L>  /  TBili  1.6<H>  /  DBili  0.4<H>  /  AST  54<H>  /  ALT  64<H>  /  AlkPhos  80  03-17        CAPILLARY BLOOD GLUCOSE         HPI:  Patient is 56 years old male with past medical history of chronic alcohol use was brought to ED by family member after he was found on floor in home today afternoon. during Examination, Patient was AAOx1-2 but could be due to Ativan he got in ED. Attempted to call Ck but didn't answer. So information was obtained from charts. Patient has been complaining of generalized weakness and he said "I didn't feel my legs". Patient reports that he fell many times in last month. Patient admits that he drinks alcohol occasionally and not sure when the last time he drunk. On Examination; Patient had sensory deficits but able to move his extremities. Patient is able to follow commands. Patient denied leg pain, recent trauma except falls, chest pain, abdominal pain, N/V or change in bowel movements.  (13 Mar 2022 20:55)    Patient is a 56y old  Male who presents with a chief complaint of Weakness / Ataxia / Possible alcohol intoxication. Pt seen somnolent in bad, awakens to name.             REVIEW OF SYSTEMS: denies fever, chills, SOB, palpitations, chest pain, abdominal pain, nausea, vomitting, diarrhea, constipation, dizziness    MEDICATIONS  (STANDING):  chlordiazePOXIDE 50 milliGRAM(s) Oral every 8 hours  cyanocobalamin Injectable 1000 MICROGram(s) IntraMuscular <User Schedule>  dextrose 5%. 1000 milliLiter(s) (75 mL/Hr) IV Continuous <Continuous>  folic acid 1 milliGRAM(s) Oral daily  influenza   Vaccine 0.5 milliLiter(s) IntraMuscular once  multivitamin 1 Tablet(s) Oral daily  pantoprazole    Tablet 40 milliGRAM(s) Oral before breakfast  thiamine 100 milliGRAM(s) Oral daily    MEDICATIONS  (PRN):  LORazepam     Tablet 0.5 milliGRAM(s) Oral every 4 hours PRN Agitation      PHYSICAL EXAM:  VS- 114/67, P 82  GENERAL: Somnolent  HEAD:  Atraumatic, Normocephalic  EYES: EOMI  ENMT: Nares patent  NECK: Supple  NERVOUS SYSTEM:  N focal deficits  CHEST/LUNG: CTA  HEART: S1, S2  ABDOMEN: Soft, Nontender  EXTREMITIES: mild tremors BL hands  LYMPH: No lymphadenopathy noted  SKIN: No rashes or lesions

## 2022-03-19 NOTE — PROGRESS NOTE ADULT - PROBLEM SELECTOR PLAN 4
-  Likely due to alcohol use and liver problems   -  No bleeding noted, plts 66 today (3/18/22)  -  Will continue to monitor -  Likely due to alcohol use and liver problems   -  No bleeding noted, plts 107   -  Will continue to monitor

## 2022-03-19 NOTE — PROGRESS NOTE ADULT - NSPROGADDITIONALINFOA_GEN_ALL_CORE
55 y/o male with alcohol dependence p/w withdrawal syndrome requiring ICU management including phenobarbital, IV thiamine. Today agitated, restless with CIWA score of 16- triggering ICU consult. Had previously been on librium but now on ativan. At time of my assessment however, the patient was quite somnolent.    1. Complicated alcohol withdrawal syndrome with   2. delirium tremens  3. Toxic-Metabolic encephalopathy with Wernicke's  4. thrombocytopenia-improving  5. transaminitis- improving    better symptom control with librium. After several hours of agitation, now somnolent likely in setting of frequent ativan stacking  for now switch to librium though hold in current state for sedation. Start with librium taper of q 8 hours though may adjust to less frequent administration based on clinical response. Continue with 1:1 safety observer  -prn ativan per ciwa score  s/p 3 day course of IV thiamine in the ICU  out of bed to chair . 55 y/o male with alcohol dependence p/w withdrawal syndrome requiring ICU management including phenobarbital, IV thiamine. Today agitated, restless with CIWA score of 16- triggering ICU consult. Had previously been on librium but now on ativan. At time of my assessment however, the patient was quite somnolent.    1. Complicated alcohol withdrawal syndrome with   2. delirium tremens  3. Toxic-Metabolic encephalopathy with Wernicke's  4. thrombocytopenia-improving  5. transaminitis- improving     Librium dose decreased from 50mg q8 to 25 mg q8 due to lethargy.   Continue with 1:1 safety observer  -prn ativan per ciwa score  s/p 3 day course of IV thiamine in the ICU  out of bed to chair .

## 2022-03-19 NOTE — PROGRESS NOTE ADULT - PROBLEM SELECTOR PLAN 2
-  Patient has hx of alcoholic use   -  Unknown how much he drinks  -  Alcohol level of 23 on admission, urine drug screen negative  -  continue CIWA protocol with IV ativan 2mg standing and PRN  -  Librium taper switched to ativan taper 3/17, had CIWA 16 this am, ICU consulted, recommended switching back to Librium.  Discussed with Dr. Chau as well.  Switched back to Librium today (3/18). Discussed with Dr. Muniz.   -  Follow up with  social work for ETOH resources -  Patient has hx of alcoholic use   -  Unknown how much he drinks  -  Alcohol level of 23 on admission, urine drug screen negative  -  continue CIWA protocol with IV ativan 2mg standing and PRN  -  Librium taper switched to ativan taper 3/17, had CIWA 16, ICU consulted, recommended switching back to Librium.  Discussed with Dr. Chau as well.  Switched back to Librium  (3/18).   -Pt somnolent  3/19- Librium dose decreased from 50mg q8 to 25 mg q8.  -  Follow up with  social work for ETOH resources

## 2022-03-19 NOTE — PROGRESS NOTE ADULT - ASSESSMENT
55 y/o male with past medical history of chronic alcohol use was brought to ED by family member after he was found on floor in home today afternoon. Patient complaining of generalized weakness and he said "I didn't feel my legs". Patient reports that he fell many times in last month. Patient admits that he drinks alcohol occasionally and not sure when the last time he drunk. Pt was also found to have elevated liver enzymes and blood alcohol level of 23. Patient will be admitted to medicine for further management of alcohol withdrawal and generalized weakness, Neurology Dr. Tyler consulted.  CT head was negative for any acute abnormalities.   MRI thoracic and lumbar - Moderate disc degeneration and spondylosis at T6-7 through T11-T12 as well as L3-4 through L5-S1 with loss of disc height and signal   within the nucleus pulposus. Disc bulges are noted at these levels which minimally flatten the ventral thecal sac disc herniation noted arachnoid   space.  US RUQ- Hepatic steatosis. No cholelithiasis or biliary ductal dilatation.    Pt is alert, awake, appears anxious, +tremors. Denies any headache, nausea, vomiting or dizziness.     3/18 Patient back on Librium, more calm.   55 y/o male with past medical history of chronic alcohol use was brought to ED by family member after he was found on floor in home today afternoon. Patient complaining of generalized weakness and he said "I didn't feel my legs". Patient reports that he fell many times in last month. Patient admits that he drinks alcohol occasionally and not sure when the last time he drunk. Pt was also found to have elevated liver enzymes and blood alcohol level of 23. Patient will be admitted to medicine for further management of alcohol withdrawal and generalized weakness, Neurology Dr. Tyler consulted.  CT head was negative for any acute abnormalities.   MRI thoracic and lumbar - Moderate disc degeneration and spondylosis at T6-7 through T11-T12 as well as L3-4 through L5-S1 with loss of disc height and signal   within the nucleus pulposus. Disc bulges are noted at these levels which minimally flatten the ventral thecal sac disc herniation noted arachnoid   space.  US RUQ- Hepatic steatosis. No cholelithiasis or biliary ductal dilatation.    Pt is alert, awake, appears anxious, +tremors. Denies any headache, nausea, vomiting or dizziness.     3/18 Patient back on Librium, more calm.    3/19- Pt somnolent this morning; awakens to name.    Advancement Flap (Double) Text: The defect edges were debeveled with a #15 scalpel blade.  Given the location of the defect and the proximity to free margins a double advancement flap was deemed most appropriate.  Using a sterile surgical marker, the appropriate advancement flaps were drawn incorporating the defect and placing the expected incisions within the relaxed skin tension lines where possible.    The area thus outlined was incised deep to adipose tissue with a #15 scalpel blade.  The skin margins were undermined to an appropriate distance in all directions utilizing iris scissors.

## 2022-03-20 LAB
ALBUMIN SERPL ELPH-MCNC: 3.1 G/DL — LOW (ref 3.5–5)
ALP SERPL-CCNC: 81 U/L — SIGNIFICANT CHANGE UP (ref 40–120)
ALT FLD-CCNC: 60 U/L DA — SIGNIFICANT CHANGE UP (ref 10–60)
ANION GAP SERPL CALC-SCNC: 11 MMOL/L — SIGNIFICANT CHANGE UP (ref 5–17)
AST SERPL-CCNC: 41 U/L — HIGH (ref 10–40)
BILIRUB SERPL-MCNC: 1.2 MG/DL — SIGNIFICANT CHANGE UP (ref 0.2–1.2)
BUN SERPL-MCNC: 11 MG/DL — SIGNIFICANT CHANGE UP (ref 7–18)
CALCIUM SERPL-MCNC: 9.2 MG/DL — SIGNIFICANT CHANGE UP (ref 8.4–10.5)
CHLORIDE SERPL-SCNC: 107 MMOL/L — SIGNIFICANT CHANGE UP (ref 96–108)
CO2 SERPL-SCNC: 20 MMOL/L — LOW (ref 22–31)
CREAT SERPL-MCNC: 0.86 MG/DL — SIGNIFICANT CHANGE UP (ref 0.5–1.3)
EGFR: 102 ML/MIN/1.73M2 — SIGNIFICANT CHANGE UP
GLUCOSE SERPL-MCNC: 74 MG/DL — SIGNIFICANT CHANGE UP (ref 70–99)
HCT VFR BLD CALC: 43.2 % — SIGNIFICANT CHANGE UP (ref 39–50)
HGB BLD-MCNC: 15 G/DL — SIGNIFICANT CHANGE UP (ref 13–17)
MCHC RBC-ENTMCNC: 34.7 GM/DL — SIGNIFICANT CHANGE UP (ref 32–36)
MCHC RBC-ENTMCNC: 35 PG — HIGH (ref 27–34)
MCV RBC AUTO: 100.7 FL — HIGH (ref 80–100)
NRBC # BLD: 0 /100 WBCS — SIGNIFICANT CHANGE UP (ref 0–0)
PLATELET # BLD AUTO: 122 K/UL — LOW (ref 150–400)
POTASSIUM SERPL-MCNC: 3.9 MMOL/L — SIGNIFICANT CHANGE UP (ref 3.5–5.3)
POTASSIUM SERPL-SCNC: 3.9 MMOL/L — SIGNIFICANT CHANGE UP (ref 3.5–5.3)
PROT SERPL-MCNC: 7 G/DL — SIGNIFICANT CHANGE UP (ref 6–8.3)
RBC # BLD: 4.29 M/UL — SIGNIFICANT CHANGE UP (ref 4.2–5.8)
RBC # FLD: 11.3 % — SIGNIFICANT CHANGE UP (ref 10.3–14.5)
SARS-COV-2 RNA SPEC QL NAA+PROBE: SIGNIFICANT CHANGE UP
SODIUM SERPL-SCNC: 138 MMOL/L — SIGNIFICANT CHANGE UP (ref 135–145)
WBC # BLD: 7.13 K/UL — SIGNIFICANT CHANGE UP (ref 3.8–10.5)
WBC # FLD AUTO: 7.13 K/UL — SIGNIFICANT CHANGE UP (ref 3.8–10.5)

## 2022-03-20 PROCEDURE — 99232 SBSQ HOSP IP/OBS MODERATE 35: CPT

## 2022-03-20 RX ADMIN — PANTOPRAZOLE SODIUM 40 MILLIGRAM(S): 20 TABLET, DELAYED RELEASE ORAL at 05:26

## 2022-03-20 RX ADMIN — Medication 100 MILLIGRAM(S): at 11:29

## 2022-03-20 RX ADMIN — Medication 1 MILLIGRAM(S): at 11:29

## 2022-03-20 RX ADMIN — Medication 25 MILLIGRAM(S): at 05:26

## 2022-03-20 RX ADMIN — Medication 1 DROP(S): at 17:51

## 2022-03-20 RX ADMIN — Medication 1 TABLET(S): at 11:30

## 2022-03-20 RX ADMIN — Medication 25 MILLIGRAM(S): at 23:47

## 2022-03-20 NOTE — PROGRESS NOTE ADULT - PROBLEM SELECTOR PLAN 6
-  PT recommend home with home PT when medically cleared for discharge  -  Patient reports living in [place] with brother,  not in [place] with ex wife,  currently unsure of where he would go when medically stable for discharge.

## 2022-03-20 NOTE — PROGRESS NOTE ADULT - PROBLEM SELECTOR PLAN 2
-  Patient has hx of alcoholic use   -  Unknown how much he drinks  -  Alcohol level of 23 on admission, urine drug screen negative  -  continue CIWA protocol with IV ativan 2mg standing and PRN  -  Librium taper switched to ativan taper 3/17, had CIWA 16, ICU consulted, recommended switching back to Librium.  Discussed with Dr. Chau as well.  Switched back to Librium  (3/18).   - 3/20- Librium dose decreased to 25mg q12.   -  Follow up with  social work for ETOH resources

## 2022-03-20 NOTE — CHART NOTE - NSCHARTNOTEFT_GEN_A_CORE
Contacted pt's emergency contact / brother Ck and updated on pt's clinical condition, and plan of care, and discharge plan tomorrow.  D/w Ck that pt requests to be discharged to Ck's home. Ck expresses concerns re: pt's alcohol use , "Im not gonna babysit him, I want only the best for him, I'm aftraid he will fall....His wife will take him back in [place] where he can follow-up with his own doctor..."    All questions and concerns addressed.   F/u with EFFIE Gay NP Medicine  [phone]

## 2022-03-20 NOTE — CHART NOTE - NSCHARTNOTEFT_GEN_A_CORE
EVENT: Orders reviewed. Pt is on tapering Librium. Had Librium 25mg, PO @ 6AM. MD's notes dated 03/20 stated  Librium 25mg, Q12 today.    HPI: 56 years old male with past medical history of chronic alcohol use who was brought to ED by family member after he was found on floor .  Pt admitted  for delirium tremens, severe alcohol withdrawal,  to the ICU.    Subjective: "I need my medication now for the alcohol".    OBJECTIVE:  Vital Signs Last 24 Hrs  T(C): 36.4 (20 Mar 2022 20:11), Max: 37.1 (20 Mar 2022 12:46)  T(F): 97.6 (20 Mar 2022 20:11), Max: 98.7 (20 Mar 2022 12:46)  HR: 93 (20 Mar 2022 20:11) (86 - 93)  BP: 117/70 (20 Mar 2022 20:11) (117/70 - 123/76)  BP(mean): --  RR: 19 (20 Mar 2022 20:11) (17 - 19)  SpO2: 98% (20 Mar 2022 20:11) (97% - 98%)    FOCUSED PHYSICAL EXAM:  Neuro: awake, alert, In NAD  Cardiovascular: Pulses +2 B/L in lower and upper extremities, HR regular, BP stable, No edema.  Respiratory: Respirations regular, unlabored, breath sounds clear B/L.   GI: Abdomen soft, non-tender, positive bowel sounds.  : no bladder distention noted. No complaints at this time.  Skin: Dry, intact, no bruising, no diaphoresis.    LABS:                        15.0   7.13  )-----------( 122      ( 20 Mar 2022 06:21 )             43.2     03-20    138  |  107  |  11  ----------------------------<  74  3.9   |  20<L>  |  0.86    Ca    9.2      20 Mar 2022 06:21    TPro  7.0  /  Alb  3.1<L>  /  TBili  1.2  /  DBili  x   /  AST  41<H>  /  ALT  60  /  AlkPhos  81  03-20      EKG:   IMAGING:    ASSESSMENT/PROBLEM: Alcohol withdrawal      PLAN:   1. Librium 25mg, PO x 1 dose ordered  2. Monitor response to treatment  3. Cont present care/treatment  4. Supportive care

## 2022-03-20 NOTE — DISCHARGE NOTE PROVIDER - NSDCMRMEDTOKEN_GEN_ALL_CORE_FT
cyanocobalamin 100 mcg oral tablet: 1 tab(s) orally once a day   folic acid 1 mg oral tablet: 1 tab(s) orally once a day  Multiple Vitamins oral tablet: 1 tab(s) orally once a day  ocular lubricant ophthalmic solution: 1 drop(s) to each affected eye 2 times a day  pantoprazole 40 mg oral delayed release tablet: 1 tab(s) orally once a day (before a meal)  thiamine 100 mg oral tablet: 1 tab(s) orally once a day  tobramycin 0.3% ophthalmic solution: 1 drop(s) to each affected eye every 4 hours   cyanocobalamin 100 mcg oral tablet: 1 tab(s) orally once a day   folic acid 1 mg oral tablet: 1 tab(s) orally once a day  melatonin 3 mg oral tablet: 1 tab(s) orally once a day (at bedtime)  mirtazapine 15 mg oral tablet: 1 tab(s) orally once a day (at bedtime)  Multiple Vitamins oral tablet: 1 tab(s) orally once a day  ocular lubricant ophthalmic solution: 1 drop(s) to each affected eye 2 times a day  pantoprazole 40 mg oral delayed release tablet: 1 tab(s) orally once a day (before a meal)  thiamine 100 mg oral tablet: 1 tab(s) orally once a day  tobramycin 0.3% ophthalmic solution: 1 drop(s) to each affected eye every 4 hours   cyanocobalamin 100 mcg oral tablet: 1 tab(s) orally once a day   folic acid 1 mg oral tablet: 1 tab(s) orally once a day  melatonin 3 mg oral tablet: 1 tab(s) orally once a day (at bedtime)  mirtazapine 15 mg oral tablet: 1 tab(s) orally once a day (at bedtime)  Multiple Vitamins oral tablet: 1 tab(s) orally once a day  ocular lubricant ophthalmic solution: 1 drop(s) to each affected eye 2 times a day  pantoprazole 40 mg oral delayed release tablet: 1 tab(s) orally once a day (before a meal)  thiamine 100 mg oral tablet: 1 tab(s) orally once a day

## 2022-03-20 NOTE — DISCHARGE NOTE PROVIDER - NSDCCPCAREPLAN_GEN_ALL_CORE_FT
PRINCIPAL DISCHARGE DIAGNOSIS  Diagnosis: Weakness  Assessment and Plan of Treatment: due to Wernicke's encephalopathy  Continue Thiamine as prescribed.  Physical therapy.   Maintain adequate hydration, nutrition, rest .   Fall precautions.   Alcohol cessation.   Follow-up with your primary care physician within 1 week. Call for appointment.  Report to your doctor or seek immediate medical attention if you develop any changes in your condition and or worsening signs/symptoms (such as and not limited : worsening weakness, confusion, ).        SECONDARY DISCHARGE DIAGNOSES  Diagnosis: Transaminitis  Assessment and Plan of Treatment: liver disease due to alcohol intake  Alcohol cessation  Follow-up with your primary care physician within 1 week. Call for appointment.  Report to your doctor or seek immediate medical attention if you develop any changes in your condition and or worsening signs/symptoms (such as and not limited : fever, abdominal discomfort, nausea/vomiting).      Diagnosis: Alcohol abuse  Assessment and Plan of Treatment: Stop alcohol intake.  Reach out to alcohol rehab referrals.   Follow-up with your primary care physician       PRINCIPAL DISCHARGE DIAGNOSIS  Diagnosis: Wernicke's encephalopathy  Assessment and Plan of Treatment: You were admitted after sustaining a fall in your home with extreme weakness associated with confusion and delirium due to Wernick's encephalopathy due to alcohol abuse.  You were followed by neurologist, treated with intravenous fluids and Thiamine.  Your alcohol withdrawal symptoms were managed with medications and safety precautions.  Medications were titrated to off and you appear with no further withdrawal symptoms.  -Alcohol cessation with outpatient rehab  -Take supplements as prescribed  -Safety precautions      SECONDARY DISCHARGE DIAGNOSES  Diagnosis: Transaminitis  Assessment and Plan of Treatment: liver disease due to alcohol intake  Alcohol cessation  Follow-up with your primary care physician within 1 week. Call for appointment.  Report to your doctor or seek immediate medical attention if you develop any changes in your condition and or worsening signs/symptoms (such as and not limited : fever, abdominal discomfort, nausea/vomiting).      Diagnosis: Ambulatory dysfunction  Assessment and Plan of Treatment: due to unsteady gait likely due to alcohol abuse.  You were evaluated by physical therapy and recommended home PT.  -Activity as tolerated  -Safety precautions    Diagnosis: Thrombocytopenia  Assessment and Plan of Treatment: is blood clotting abnormality noted as low white blood count.  This is a result of excessive alcohol consumption and predisposes you to excessive bleeding.  Your white blood count is improved and you are noted with no signs and symptoms of bleeding.    Diagnosis: Alcohol abuse  Assessment and Plan of Treatment: Stop alcohol intake.  Reach out to alcohol rehab referrals.   Follow-up with your primary care physician      Diagnosis: Eye infection, right  Assessment and Plan of Treatment: Apply eye drops as prescribed  Follow up with primary care doctor     PRINCIPAL DISCHARGE DIAGNOSIS  Diagnosis: Wernicke's encephalopathy  Assessment and Plan of Treatment: You were admitted after sustaining a fall in your home with extreme weakness associated with confusion and delirium due to Wernick's encephalopathy due to alcohol abuse.  You were followed by neurologist, treated with intravenous fluids and Thiamine.  Your alcohol withdrawal symptoms were managed with medications and safety precautions.  Medications were titrated to off and you appear with no further withdrawal symptoms.  -Alcohol cessation with outpatient rehab  -Take supplements as prescribed  -Safety precautions      SECONDARY DISCHARGE DIAGNOSES  Diagnosis: Transaminitis  Assessment and Plan of Treatment: liver disease due to alcohol intake  Alcohol cessation  Follow-up with your primary care physician within 1 week. Call for appointment.  Report to your doctor or seek immediate medical attention if you develop any changes in your condition and or worsening signs/symptoms (such as and not limited : fever, abdominal discomfort, nausea/vomiting).      Diagnosis: Major depression  Assessment and Plan of Treatment: Depression is a medical condition that causes feelings of sadness or hopelessness that do not go away. Depression may cause you to lose interest in things you used to enjoy. These feelings may interfere with your daily life.  you were seen by a psychiatrist, you are to continue to take remeron 15 mg at bedtime.  How can I manage depression?  Get regular physical activity. Try to be active for 30 minutes, 3 to 5 days a week. Physical activity can help relieve depression. Work with your healthcare provider to develop a plan that you enjoy. It may help to ask someone to be active with you.  Create a regular sleep schedule. A routine can help you relax before bed. Listen to music, read, or do yoga. Try to go to bed and wake up at the same time every day. Sleep is important for emotional health.  Eat a variety of healthy foods. Healthy foods include fruits, vegetables, whole-grain breads, low-fat dairy products, lean meats, fish, and cooked beans. A healthy meal plan is low in fat, salt, and added sugar.  Do not drink alcohol or use drugs. Alcohol and drugs can make depression worse. Talk to your therapist or doctor if you need help quitting.  The following resources are available at any time to help you, if needed:  National Suicide Prevention Lifeline: 1-625.743.9849 (3-538-637-TALK)  Suicide Hotline: 1-186.247.3921 (3-663-BQZPUMA)  For a list of international numbers: https://save.org/find-help/international-resources/  Call your local emergency number (911 in the [place]) if:  You think about harming yourself or someone else.  You have done something on purpose to hurt yourself.    Diagnosis: Alcohol abuse  Assessment and Plan of Treatment: Stop alcohol intake.  Reach out to alcohol rehab referrals.   Follow-up with your primary care physician      Diagnosis: Thrombocytopenia  Assessment and Plan of Treatment: is blood clotting abnormality noted as low white blood count.  This is a result of excessive alcohol consumption and predisposes you to excessive bleeding.  Your white blood count is improved and you are noted with no signs and symptoms of bleeding.    Diagnosis: Ambulatory dysfunction  Assessment and Plan of Treatment: due to unsteady gait likely due to alcohol abuse.  You were evaluated by physical therapy and recommended home PT.  -Activity as tolerated  -Safety precautions    Diagnosis: Eye infection, right  Assessment and Plan of Treatment: Apply eye drops as prescribed  Follow up with primary care doctor     PRINCIPAL DISCHARGE DIAGNOSIS  Diagnosis: Wernicke's encephalopathy  Assessment and Plan of Treatment: You were admitted after sustaining a fall in your home with extreme weakness associated with confusion and delirium due to Wernick's encephalopathy due to alcohol abuse.  You were followed by neurologist, treated with intravenous fluids and Thiamine.  Your alcohol withdrawal symptoms were managed with medications and safety precautions.  Medications were titrated to off and you appear with no further withdrawal symptoms.  -Alcohol cessation with outpatient rehab  -Take supplements as prescribed  -Safety precautions      SECONDARY DISCHARGE DIAGNOSES  Diagnosis: Transaminitis  Assessment and Plan of Treatment: liver disease due to alcohol intake  Alcohol cessation  Follow-up with your primary care physician within 1 week. Call for appointment.  Report to your doctor or seek immediate medical attention if you develop any changes in your condition and or worsening signs/symptoms (such as and not limited : fever, abdominal discomfort, nausea/vomiting).      Diagnosis: Major depression  Assessment and Plan of Treatment: Depression is a medical condition that causes feelings of sadness or hopelessness that do not go away. Depression may cause you to lose interest in things you used to enjoy. These feelings may interfere with your daily life.  you were seen by a psychiatrist, you are to continue to take remeron 15 mg at bedtime and melatonin 3 mg at bedtime to help with insomnia  How can I manage depression?  Get regular physical activity. Try to be active for 30 minutes, 3 to 5 days a week. Physical activity can help relieve depression. Work with your healthcare provider to develop a plan that you enjoy. It may help to ask someone to be active with you.  Create a regular sleep schedule. A routine can help you relax before bed. Listen to music, read, or do yoga. Try to go to bed and wake up at the same time every day. Sleep is important for emotional health.  Eat a variety of healthy foods. Healthy foods include fruits, vegetables, whole-grain breads, low-fat dairy products, lean meats, fish, and cooked beans. A healthy meal plan is low in fat, salt, and added sugar.  Do not drink alcohol or use drugs. Alcohol and drugs can make depression worse. Talk to your therapist or doctor if you need help quitting.  The following resources are available at any time to help you, if needed:  National Suicide Prevention Lifeline: 1-134.443.5977 (4-857-059-TALK)  Suicide Hotline: 1-843.386.3113 (0-541-FLRRBWA)  For a list of international numbers: https://save.org/find-help/international-resources/  Call your local emergency number (911 in the [place]) if:  You think about harming yourself or someone else.  You have done something on purpose to hurt yourself.    Diagnosis: Alcohol abuse  Assessment and Plan of Treatment: Stop alcohol intake.  Reach out to alcohol rehab referrals.   Follow-up with your primary care physician      Diagnosis: Thrombocytopenia  Assessment and Plan of Treatment: is blood clotting abnormality noted as low white blood count.  This is a result of excessive alcohol consumption and predisposes you to excessive bleeding.  Your white blood count is improved and you are noted with no signs and symptoms of bleeding.    Diagnosis: Ambulatory dysfunction  Assessment and Plan of Treatment: due to unsteady gait likely due to alcohol abuse.  You were evaluated by physical therapy and recommended home PT.  -Activity as tolerated  -Safety precautions    Diagnosis: Eye infection, right  Assessment and Plan of Treatment: Apply eye drops as prescribed  Follow up with primary care doctor     PRINCIPAL DISCHARGE DIAGNOSIS  Diagnosis: Wernicke's encephalopathy  Assessment and Plan of Treatment: You were admitted after sustaining a fall in your home with extreme weakness associated with confusion and delirium due to Wernick's encephalopathy due to alcohol abuse.  You were followed by neurologist, treated with intravenous fluids and Thiamine.  Your alcohol withdrawal symptoms were managed with medications and safety precautions.  Medications were titrated to off and you appear with no further withdrawal symptoms.  -Alcohol cessation with outpatient rehab  -Take supplements as prescribed  -Safety precautions      SECONDARY DISCHARGE DIAGNOSES  Diagnosis: Transaminitis  Assessment and Plan of Treatment: liver disease due to alcohol intake  Alcohol cessation  Follow-up with your primary care physician within 1 week. Call for appointment.  Report to your doctor or seek immediate medical attention if you develop any changes in your condition and or worsening signs/symptoms (such as and not limited : fever, abdominal discomfort, nausea/vomiting).      Diagnosis: Major depression  Assessment and Plan of Treatment: Depression is a medical condition that causes feelings of sadness or hopelessness that do not go away. Depression may cause you to lose interest in things you used to enjoy. These feelings may interfere with your daily life.  you were seen by a psychiatrist, you are to continue to take remeron 15 mg at bedtime and melatonin 3 mg at bedtime to help with insomnia  How can I manage depression?  Get regular physical activity. Try to be active for 30 minutes, 3 to 5 days a week. Physical activity can help relieve depression. Work with your healthcare provider to develop a plan that you enjoy. It may help to ask someone to be active with you.  Create a regular sleep schedule. A routine can help you relax before bed. Listen to music, read, or do yoga. Try to go to bed and wake up at the same time every day. Sleep is important for emotional health.  Eat a variety of healthy foods. Healthy foods include fruits, vegetables, whole-grain breads, low-fat dairy products, lean meats, fish, and cooked beans. A healthy meal plan is low in fat, salt, and added sugar.  Do not drink alcohol or use drugs. Alcohol and drugs can make depression worse. Talk to your therapist or doctor if you need help quitting.  The following resources are available at any time to help you, if needed:  National Suicide Prevention Lifeline: 1-555.427.7193 (8-496-321-TALK)  Suicide Hotline: 1-386.664.5698 (9-175-LAKBEDY)  For a list of international numbers: https://save.org/find-help/international-resources/  Call your local emergency number (911 in the [place]) if:  You think about harming yourself or someone else.  You have done something on purpose to hurt yourself.    Diagnosis: Alcohol abuse  Assessment and Plan of Treatment: Stop alcohol intake.  Reach out to alcohol rehab referrals.   Follow-up with your primary care physician      Diagnosis: Thrombocytopenia  Assessment and Plan of Treatment: is blood clotting abnormality noted as low white blood count.  This is a result of excessive alcohol consumption and predisposes you to excessive bleeding.  Your white blood count is improved and you are noted with no signs and symptoms of bleeding.    Diagnosis: Ambulatory dysfunction  Assessment and Plan of Treatment: due to unsteady gait likely due to alcohol abuse.  You were evaluated by physical therapy and recommended home PT.  -Activity as tolerated  -Safety precautions    Diagnosis: Eye infection, right  Assessment and Plan of Treatment: Apply eye drops as prescribed  Follow up with primary care doctor    Diagnosis: H/O degenerative disc disease  Assessment and Plan of Treatment: you had an MRI thoracic and lumbar showed Moderate disc degeneration and spondylosis at T6-7 through T11-T12 as well as L3-4 through L5-S1 with loss of disc height and signal within the nucleus pulposus. Disc bulges are noted at these levels which minimally flatten the ventral thecal sac disc herniation noted arachnoid space.  you can take tylenol 650 mg every 6 hours as needed for pain.  physical therapy recommended you to get daily physical therapy at rehab center.        PRINCIPAL DISCHARGE DIAGNOSIS  Diagnosis: Wernicke's encephalopathy  Assessment and Plan of Treatment: You were admitted after sustaining a fall in your home with extreme weakness associated with confusion and delirium due to Wernick's encephalopathy due to alcohol abuse.  You were followed by neurologist, treated with intravenous fluids and Thiamine.  Your alcohol withdrawal symptoms were managed with medications and safety precautions.  Medications were titrated to off and you appear with no further withdrawal symptoms.  -Alcohol cessation with outpatient rehab  -Take supplements as prescribed  -Safety precautions      SECONDARY DISCHARGE DIAGNOSES  Diagnosis: Transaminitis  Assessment and Plan of Treatment: liver disease due to alcohol intake  Alcohol cessation  Follow-up with your primary care physician within 1 week. Call for appointment.  Report to your doctor or seek immediate medical attention if you develop any changes in your condition and or worsening signs/symptoms (such as and not limited : fever, abdominal discomfort, nausea/vomiting).      Diagnosis: Alcohol abuse  Assessment and Plan of Treatment: Stop alcohol intake.  Reach out to alcohol rehab referrals.   Follow-up with your primary care physician      Diagnosis: Thrombocytopenia  Assessment and Plan of Treatment: is blood clotting abnormality noted as low white blood count.  This is a result of excessive alcohol consumption and predisposes you to excessive bleeding.  Your white blood count is improved and you are noted with no signs and symptoms of bleeding.    Diagnosis: Ambulatory dysfunction  Assessment and Plan of Treatment: due to unsteady gait likely due to alcohol abuse.  You were evaluated by physical therapy and recommended home PT.  -Activity as tolerated  -Safety precautions    Diagnosis: Eye infection, right  Assessment and Plan of Treatment: Apply eye drops as prescribed  Follow up with primary care doctor    Diagnosis: Major depression  Assessment and Plan of Treatment: Depression is a medical condition that causes feelings of sadness or hopelessness that do not go away. Depression may cause you to lose interest in things you used to enjoy. These feelings may interfere with your daily life.  you were seen by a psychiatrist, you are to continue to take remeron 15 mg at bedtime and melatonin 3 mg at bedtime to help with insomnia  How can I manage depression?  Get regular physical activity. Try to be active for 30 minutes, 3 to 5 days a week. Physical activity can help relieve depression. Work with your healthcare provider to develop a plan that you enjoy. It may help to ask someone to be active with you.  Create a regular sleep schedule. A routine can help you relax before bed. Listen to music, read, or do yoga. Try to go to bed and wake up at the same time every day. Sleep is important for emotional health.  Eat a variety of healthy foods. Healthy foods include fruits, vegetables, whole-grain breads, low-fat dairy products, lean meats, fish, and cooked beans. A healthy meal plan is low in fat, salt, and added sugar.  Do not drink alcohol or use drugs. Alcohol and drugs can make depression worse. Talk to your therapist or doctor if you need help quitting.  The following resources are available at any time to help you, if needed:  National Suicide Prevention Lifeline: 1-516.190.8502 (8-943-416-TALK)  Suicide Hotline: 1-816.365.4729 (1-894-EBMXKRM)  For a list of international numbers: https://save.org/find-help/international-resources/  Call your local emergency number (911 in the [place]) if:  You think about harming yourself or someone else.  You have done something on purpose to hurt yourself.    Diagnosis: H/O degenerative disc disease  Assessment and Plan of Treatment: you had an MRI thoracic and lumbar showed Moderate disc degeneration and spondylosis at T6-7 through T11-T12 as well as L3-4 through L5-S1 with loss of disc height and signal within the nucleus pulposus. Disc bulges are noted at these levels which minimally flatten the ventral thecal sac disc herniation noted arachnoid space.  you can take tylenol 650 mg every 6 hours as needed for pain.  physical therapy recommended you to get daily physical therapy at rehab center.       Diagnosis: Alcohol abuse with withdrawal delirium  Assessment and Plan of Treatment:

## 2022-03-20 NOTE — DISCHARGE NOTE PROVIDER - HOSPITAL COURSE
56 years old male with past medical history of chronic alcohol use who was brought to ED by family member after he was found on floor .  Pt admitted  for delirium tremens, severe alcohol withdrawal to the ICU. Pt was started on Precedex gtt, librium taper, and ativan prn. Pt was placed on CIWA protocol. Pt was started on thiamine to for Wernicke's Encephalopathy. Pt has history of leg pain and ataxic gait. MR Thoracic and lumbar spine showed moderate disc degeneration and spondylosis at T6-7 through T11-T12 as well as L3-4 through L5-S1 with loss of disc height and signal within the nucleus pulposus. Disc bulges are noted at these levels which minimally flatten the ventral thecal sac disc herniation noted arachnoid space. Neuro Dr. Tyler consulted.  Ataxia and weakness most likely c/w Wernicke's Encephalopathy and peripheral neuropathy . Encourage Thiamine replacement & alcohol abuse treatment. Maintain fall precautions    Pt had thrombocytopenia, likely 2/2 chronic alcohol abuse. SCD boots and Protonix for DVT and GI ppx. .   Pt improving. CIWA scores 4--->>1. PT evaluated and endorses home PT . Pt wants to be discharged to his brother's home. CM following.       ----------incomplete-----------       56 years old male with past medical history of chronic alcohol use who was brought to ED by family member after he was found on floor .  Pt admitted  for delirium tremens, severe alcohol withdrawal to the ICU. Pt was started on Precedex gtt, librium taper, and ativan prn. Pt was placed on CIWA protocol. Pt was started on thiamine to for Wernicke's Encephalopathy. Pt has history of leg pain and ataxic gait. MR Thoracic and lumbar spine showed moderate disc degeneration and spondylosis at T6-7 through T11-T12 as well as L3-4 through L5-S1 with loss of disc height and signal within the nucleus pulposus. Disc bulges are noted at these levels which minimally flatten the ventral thecal sac disc herniation noted arachnoid space. Neuro Dr. Tyler consulted.  Ataxia and weakness most likely c/w Wernicke's Encephalopathy and peripheral neuropathy . Encourage Thiamine replacement & alcohol abuse treatment. Maintain fall precautions    Pt had thrombocytopenia, likely 2/2 chronic alcohol abuse. SCD boots and Protonix for DVT and GI ppx. .   Pt improving. CIWA scores 4--->>1. PT evaluated and endorses home PT . Pt wants to be discharged to his brother's home. CM following. List Alcohol treatment programs provided to pt.       ----------------incomplete---------------- 56 years old male with past medical history of chronic alcohol use who was brought to ED by family member after he was found on floor .  Pt admitted  for delirium tremors, severe alcohol withdrawal to the ICU. Pt was started on Precedex gtt, librium taper, and ativan prn. Pt was placed on CIWA protocol. Pt was started on thiamine to for Wernicke's Encephalopathy. Pt has history of leg pain and ataxic gait. MR Thoracic and lumbar spine showed moderate disc degeneration and spondylosis at T6-7 through T11-T12 as well as L3-4 through L5-S1 with loss of disc height and signal within the nucleus pulposus. Disc bulges are noted at these levels which minimally flatten the ventral thecal sac disc herniation noted arachnoid space. Neuro Dr. Tyler consulted.  Ataxia and weakness most likely c/w Wernicke's Encephalopathy and peripheral neuropathy . Encourage Thiamine replacement & alcohol abuse treatment. Maintain fall precautions    Pt had thrombocytopenia, likely 2/2 chronic alcohol abuse. SCD boots and Protonix for DVT and GI ppx. .   Pt improving. CIWA scores 4--->>0. PT evaluated and endorses home PT . Pt's spouse resides in [place].  Pt. will be discharged to [place] where his wife has found an OP rehab for sub abuse as pt. does not want an inpatient rehab. Pt. is medically optimized for discharge to home.       56 years old male with past medical history of chronic alcohol use who was brought to ED by family member after he was found on floor .  Pt admitted  for delirium tremors, severe alcohol withdrawal to the ICU. Pt was started on Precedex gtt, librium taper, and ativan prn. Pt was placed on CIWA protocol. Pt was started on thiamine to for Wernicke's Encephalopathy. Pt has history of leg pain and ataxic gait. MR Thoracic and lumbar spine showed moderate disc degeneration and spondylosis at T6-7 through T11-T12 as well as L3-4 through L5-S1 with loss of disc height and signal within the nucleus pulposus. Disc bulges are noted at these levels which minimally flatten the ventral thecal sac disc herniation noted arachnoid space. Neuro Dr. Tyler consulted.  Ataxia and weakness most likely c/w Wernicke's Encephalopathy and peripheral neuropathy . Encourage Thiamine replacement & alcohol abuse treatment. Maintain fall precautions    Pt had thrombocytopenia, likely 2/2 chronic alcohol abuse. SCD boots and Protonix for DVT and GI ppx. .   Pt improving. CIWA scores 4--->>0. PT evaluated 3/14 and recc home PT . Repeat PT eval on 3/23 recc KIKI as pt. with increased weakness due to deconditioning during prolonged hospitalization.    Pt's mentation greatly improved off anti withdrawal medications with no further s&s of ETOH withdrawal.    Spouse resides in [place].  Inpatient rehab placement arranged for pt. by spouse with pt. agreement.  Pt. is undergoing aggressive PT while inpatient prior to discharge to ETOH rehab, noted making pdaily progress.     Pt. is medically optimized for discharge to ETOH inpatient rehab in [place].       56 years old male with past medical history of chronic alcohol use who was brought to ED by family member after he was found on floor .  Pt admitted  for delirium tremors, severe alcohol withdrawal to the ICU. Pt was started on Precedex gtt, librium taper, and ativan prn. Pt was placed on CIWA protocol. Pt was started on thiamine to for Wernicke's Encephalopathy. Pt has history of leg pain and ataxic gait. MR Thoracic and lumbar spine showed moderate disc degeneration and spondylosis at T6-7 through T11-T12 as well as L3-4 through L5-S1 with loss of disc height and signal within the nucleus pulposus. Disc bulges are noted at these levels which minimally flatten the ventral thecal sac disc herniation noted arachnoid space. Neuro Dr. Tyler consulted.  Ataxia and weakness most likely c/w Wernicke's Encephalopathy and peripheral neuropathy . Encourage Thiamine replacement & alcohol abuse treatment. Maintain fall precautions    Pt had thrombocytopenia, likely 2/2 chronic alcohol abuse. SCD boots and Protonix for DVT and GI ppx. .   Pt improving. CIWA scores 4--->>0. PT evaluated 3/14 and recc home PT . Repeat PT eval on 3/23 recc KIKI as pt. with increased weakness due to deconditioning during prolonged hospitalization.    Pt's mentation greatly improved off anti withdrawal medications with no further s&s of ETOH withdrawal.    Spouse resides in [place].  Inpatient rehab placement arranged for pt. by spouse with pt. agreement.  Pt. is undergoing aggressive PT while inpatient prior to discharge to ETOH rehab, noted making pdaily progress.       Pt was also seen by psychiatry Dr. Chau, pt is to continue remeron 15 mg and melatonin 3 mg daily for depression and insomnia.     Pt. is medically optimized for discharge to ETOH inpatient rehab in [place].       56 years old male with past medical history of chronic alcohol use who was brought to ED by family member after he was found on floor .  Pt admitted  for delirium tremors, severe alcohol withdrawal to the ICU. Pt was started on Precedex gtt, librium taper, and ativan prn. Pt was placed on CIWA protocol. Pt was started on thiamine to for Wernicke's Encephalopathy. Pt has history of leg pain and ataxic gait. MR Thoracic and lumbar spine showed moderate disc degeneration and spondylosis at T6-7 through T11-T12 as well as L3-4 through L5-S1 with loss of disc height and signal within the nucleus pulposus. Disc bulges are noted at these levels which minimally flatten the ventral thecal sac disc herniation noted arachnoid space. Neuro Dr. Tyler consulted.  Ataxia and weakness most likely c/w Wernicke's Encephalopathy and peripheral neuropathy . Encourage Thiamine replacement & alcohol abuse treatment. Maintain fall precautions    Pt had thrombocytopenia, likely 2/2 chronic alcohol abuse. SCD boots and Protonix for DVT and GI ppx. .   Pt improving. CIWA scores 4--->>0. PT evaluated 3/14 and recc home PT . Repeat PT eval on 3/23 recc KIKI as pt. with increased weakness due to deconditioning during prolonged hospitalization.    Pt's mentation greatly improved off anti withdrawal medications with no further s&s of ETOH withdrawal.    Spouse resides in [place].  Inpatient rehab placement arranged for pt. by spouse with pt. agreement.  Pt. is undergoing aggressive PT while inpatient prior to discharge to ETOH rehab, noted making daily progress.       Pt was also seen by psychiatry Dr. Chau, pt is to continue remeron 15 mg and melatonin 3 mg daily for depression and insomnia.     Pt. is medically optimized for discharge to ETOH inpatient rehab in [place].

## 2022-03-20 NOTE — DISCHARGE NOTE PROVIDER - CARE PROVIDER_API CALL
Dr. Julio C Trotter, PCP  Phone: (   )    -  Fax: (   )    -  Established Patient  Follow Up Time: 1 week    [facility], General Medicine  [address] [place] [place] [address]  Phone: [phone]  Fax: (   )    -  Follow Up Time: 1 week  
- s/p coiling/stent of aneurysm on ASA and plavix  - will treat with ASA and plavix

## 2022-03-20 NOTE — PROGRESS NOTE ADULT - SUBJECTIVE AND OBJECTIVE BOX
HPI:  Patient is 56 years old male with past medical history of chronic alcohol use was brought to ED by family member after he was found on floor in home today afternoon. during Examination, Patient was AAOx1-2 but could be due to Ativan he got in ED. Attempted to call Ck but didn't answer. So information was obtained from charts. Patient has been complaining of generalized weakness and he said "I didn't feel my legs". Patient reports that he fell many times in last month. Patient admits that he drinks alcohol occasionally and not sure when the last time he drunk. On Examination; Patient had sensory deficits but able to move his extremities. Patient is able to follow commands. Patient denied leg pain, recent trauma except falls, chest pain, abdominal pain, N/V or change in bowel movements.  (13 Mar 2022 20:55)    Patient is a 56y old  Male who presents with a chief complaint of Weakness / Ataxia / Possible alcohol intoxication. Pt seen somnolent in bad, awakens to name.       REVIEW OF SYSTEMS: denies fever, chills, SOB, palpitations, chest pain, abdominal pain, nausea, vomitting, diarrhea, constipation, dizziness    MEDICATIONS  (STANDING):  chlordiazePOXIDE 50 milliGRAM(s) Oral every 8 hours  cyanocobalamin Injectable 1000 MICROGram(s) IntraMuscular <User Schedule>  dextrose 5%. 1000 milliLiter(s) (75 mL/Hr) IV Continuous <Continuous>  folic acid 1 milliGRAM(s) Oral daily  influenza   Vaccine 0.5 milliLiter(s) IntraMuscular once  multivitamin 1 Tablet(s) Oral daily  pantoprazole    Tablet 40 milliGRAM(s) Oral before breakfast  thiamine 100 milliGRAM(s) Oral daily    MEDICATIONS  (PRN):  LORazepam     Tablet 0.5 milliGRAM(s) Oral every 4 hours PRN Agitation      PHYSICAL EXAM:  VS- 117/71, P 86  GENERAL: Pt awake, responding to questions.   HEAD:  Atraumatic, Normocephalic  EYES: EOMI  ENMT: Nares patent  NECK: Supple  NERVOUS SYSTEM:  N focal deficits  CHEST/LUNG: CTA  HEART: S1, S2  ABDOMEN: Soft, Nontender  EXTREMITIES: mild tremors BL hands  LYMPH: No lymphadenopathy noted  SKIN: No rashes or lesions

## 2022-03-20 NOTE — PROGRESS NOTE ADULT - ASSESSMENT
55 y/o male with past medical history of chronic alcohol use was brought to ED by family member after he was found on floor in home today afternoon. Patient complaining of generalized weakness and he said "I didn't feel my legs". Patient reports that he fell many times in last month. Patient admits that he drinks alcohol occasionally and not sure when the last time he drunk. Pt was also found to have elevated liver enzymes and blood alcohol level of 23. Patient will be admitted to medicine for further management of alcohol withdrawal and generalized weakness, Neurology Dr. Tyler consulted.  CT head was negative for any acute abnormalities.   MRI thoracic and lumbar - Moderate disc degeneration and spondylosis at T6-7 through T11-T12 as well as L3-4 through L5-S1 with loss of disc height and signal   within the nucleus pulposus. Disc bulges are noted at these levels which minimally flatten the ventral thecal sac disc herniation noted arachnoid   space.  US RUQ- Hepatic steatosis. No cholelithiasis or biliary ductal dilatation.    Pt is alert, awake, appears anxious, +tremors. Denies any headache, nausea, vomiting or dizziness.     3/18 Patient back on Librium, more calm.    3/19- Pt somnolent this morning; awakens to name.     3/20- Pt awake this morning, responding to questions.

## 2022-03-20 NOTE — DISCHARGE NOTE PROVIDER - ATTENDING DISCHARGE PHYSICAL EXAMINATION:
General: atraumatic, no acute distress  Cardiac: RRR, normal S1, S2  Lungs: CTAB  Abdomen: Soft, non-tender  Extremities: skin intact, no edema  Neuro: A&Ox3, unsteady gait

## 2022-03-20 NOTE — PROGRESS NOTE ADULT - NSPROGADDITIONALINFOA_GEN_ALL_CORE
57 y/o male with alcohol dependence p/w withdrawal syndrome requiring ICU management including phenobarbital, IV thiamine. Today agitated, restless with CIWA score of 16- triggering ICU consult. Had previously been on librium but now on ativan. At time of my assessment however, the patient was quite somnolent.    1. Complicated alcohol withdrawal syndrome with   2. delirium tremens  3. Toxic-Metabolic encephalopathy with Wernicke's  4. thrombocytopenia-improving  5. transaminitis- improving     Librium dose tapered to 25mg q12.   Continue with 1:1 safety observer  -prn ativan per ciwa score  s/p 3 day course of IV thiamine in the ICU  out of bed to chair .

## 2022-03-20 NOTE — DISCHARGE NOTE PROVIDER - PROVIDER TOKENS
FREE:[LAST:[Dr. Julio C Trotter],FIRST:[PCP],PHONE:[(   )    -],FAX:[(   )    -],FOLLOWUP:[1 week],ESTABLISHEDPATIENT:[T]],FREE:[LAST:[[facility]],FIRST:[General Medicine],PHONE:[phone]],FAX:[(   )    -],ADDRESS:[[address] [place] [address]],FOLLOWUP:[1 week]]

## 2022-03-20 NOTE — DISCHARGE NOTE PROVIDER - NSDCPNSUBOBJ_GEN_ALL_CORE
#Alcohol Withdrawal with Delirium Tremens  #Thrombocytopenia, resolved  #Degenerative Disc Disease  #Depression    Discharge patient to alcohol rehab today.

## 2022-03-20 NOTE — PROGRESS NOTE ADULT - PROBLEM SELECTOR PLAN 4
-  Likely due to alcohol use and liver problems   -  No bleeding noted, plts 107   -  Will continue to monitor

## 2022-03-21 DIAGNOSIS — F10.20 ALCOHOL DEPENDENCE, UNCOMPLICATED: ICD-10-CM

## 2022-03-21 PROCEDURE — 99232 SBSQ HOSP IP/OBS MODERATE 35: CPT

## 2022-03-21 PROCEDURE — 90792 PSYCH DIAG EVAL W/MED SRVCS: CPT

## 2022-03-21 RX ADMIN — Medication 1 MILLIGRAM(S): at 12:49

## 2022-03-21 RX ADMIN — PANTOPRAZOLE SODIUM 40 MILLIGRAM(S): 20 TABLET, DELAYED RELEASE ORAL at 06:16

## 2022-03-21 RX ADMIN — Medication 1 TABLET(S): at 12:49

## 2022-03-21 RX ADMIN — Medication 25 MILLIGRAM(S): at 22:40

## 2022-03-21 RX ADMIN — Medication 1 DROP(S): at 18:27

## 2022-03-21 RX ADMIN — Medication 100 MILLIGRAM(S): at 13:09

## 2022-03-21 RX ADMIN — PREGABALIN 1000 MICROGRAM(S): 225 CAPSULE ORAL at 18:27

## 2022-03-21 RX ADMIN — Medication 1 DROP(S): at 06:16

## 2022-03-21 NOTE — ADVANCED PRACTICE NURSE CONSULT - RECOMMEDATIONS
-Apply a Foam dressing to the Coccyx area Q 72hrs PRN  -Elevate/float the patiens heels using heel protectors and reposition Q 2hrs using wedges or pillows

## 2022-03-21 NOTE — PROGRESS NOTE ADULT - ASSESSMENT
57 y/o male with past medical history of chronic alcohol use was brought to ED by family member after he was found on floor in home today afternoon. Patient complaining of generalized weakness and he said "I didn't feel my legs". Patient reports that he fell many times in last month. Patient admits that he drinks alcohol occasionally and not sure when the last time he drunk. Pt was also found to have elevated liver enzymes and blood alcohol level of 23. Patient will be admitted to medicine for further management of alcohol withdrawal and generalized weakness, Neurology Dr. Tyler consulted.  CT head was negative for any acute abnormalities.   MRI thoracic and lumbar - Moderate disc degeneration and spondylosis at T6-7 through T11-T12 as well as L3-4 through L5-S1 with loss of disc height and signal   within the nucleus pulposus. Disc bulges are noted at these levels which minimally flatten the ventral thecal sac disc herniation noted arachnoid   space.  US RUQ- Hepatic steatosis. No cholelithiasis or biliary ductal dilatation.    3/21-Pt. is awake and alert confused about place and time, delirious likely in setting of Librium therapy.  Pt. seen by psych, will follow reccs.

## 2022-03-21 NOTE — BH CONSULTATION LIAISON ASSESSMENT NOTE - NSTXRELFACTOR_PSY_ALL_CORE
09/15/20 1612   Vital Signs   Temp 98.4 °F (36.9 °C)   Temp Source Oral   Pulse (Heart Rate) (!) 113   Heart Rate Source Monitor   Resp Rate 18   O2 Sat (%) 96 %   Level of Consciousness Alert   BP (!) 124/54   MAP (Calculated) 77   Provider contacted about the patients vitals, no orders were recommended at this time will continue to monitor. Non-compliant or not receiving treatment

## 2022-03-21 NOTE — BH CONSULTATION LIAISON ASSESSMENT NOTE - DETAILS
Hospital staff reported pt made statement on 3/21/22 that he wanted to kill his brother Ck, but pt denies this Dry eyes, purulent discharge from R eye Back pain, herniated discs

## 2022-03-21 NOTE — BH CONSULTATION LIAISON ASSESSMENT NOTE - NSBHSACONSEQUENCE_PSY_A_CORE FT
Multiple ER visits for alcohol withdrawal, DUI in 2021. Has never attended formal [facility] rehab program

## 2022-03-21 NOTE — BH CONSULTATION LIAISON ASSESSMENT NOTE - SUMMARY
56M, unemployed, normally lives in [place] with wife and 2 adult children, with PHx of alcohol use DO and MHx of back pain, BIBEMS 3/13 after being FOF of brother's apt in [place] and admitted for alcohol withdrawal, currently on Librium taper scheduled to end on 3/22. Psych consulted today after staff reported pt said he wanted to kill his brother. On exam, pt presents oriented only to self and acutely delirious, insisting he is not at a medical hospital and was dropped off by his brother Ck at "some rehab place on [address]" only a few hours ago. Pt denies any memory of saying he wanted to kill his brother, and denies any ideation, intent or plan to cause harm to brother or anyone else including himself. Given obvious severity of pt's delirium on today's interview, as well as collateral from pt's brother and wife which indicates that he has no h/o SI/HI or physical aggression, we strongly believe that pt's alleged statement was triggered by delirium and not by homicidal or aggressive intent. Accordingly, pt is psych cleared for HI as well as SI and does not appear to be in need of CO 1:1 observation. However, it is clear that pt has a significant problem with alcohol and appears highly likely to benefit from engagement in an organized [facility] rehab program after DC. Psychiatry will continue to follow and use best efforts to persuade pt to engage in IP or OP [facility] rehab. Pt does not appear to present an acute risk of harm to self or others at the time of assessment, and does not appear to be in need of admission to IP psych at the time of assessment.

## 2022-03-21 NOTE — BH CONSULTATION LIAISON ASSESSMENT NOTE - NSBHCHARTREVIEWINVESTIGATE_PSY_A_CORE FT
< from: CT Head No Cont (03.13.22 @ 14:35) >    HEAD CT:    VENTRICLES AND SULCI: Ventricles and sulci are unremarkable for patient   age.  INTRA-AXIAL: No intracranial mass, acute hemorrhage, or midline shift is   present. There is non-specific decreased attenuation in the white matter   likely related to sequelae of microvascular disease.  EXTRA-AXIAL: No extra-axial fluid collection is present.  INTRACRANIAL HEMORRHAGE: None.    < end of copied text >  < from: MR Thoracic Spine No Cont (03.14.22 @ 14:04) >    IMPRESSION: Moderate disc degeneration and spondylosis at T6-7 through   T11-T12 as well as L3-4 through L5-S1 with loss of disc height and signal   within the nucleus pulposus. Disc bulges are noted at these levels which   minimally flatten the ventral thecal sac disc herniation noted arachnoid   space.    < end of copied text >  < from: 12 Lead ECG (03.13.22 @ 14:10) >    QTC Calculation(Bazett) 412 ms    P Axis 57 degrees    R Axis 100 degrees    T Axis 97 degrees    Diagnosis Line Normal sinus rhythm  Rightward axis  Low voltage QRS  Cannot rule out Septal infarct , age undetermined  Abnormal ECG    < end of copied text >

## 2022-03-21 NOTE — PROGRESS NOTE ADULT - SUBJECTIVE AND OBJECTIVE BOX
NP Note discussed with  Primary Attending    Patient is a 56y old  Male who presents with a chief complaint of Weakness / Ataxia / Possible alcohol intoxication (20 Mar 2022 14:49)      INTERVAL HPI/OVERNIGHT EVENTS: no new complaints    MEDICATIONS  (STANDING):  artificial  tears Solution 1 Drop(s) Both EYES two times a day  chlordiazePOXIDE 25 milliGRAM(s) Oral every 12 hours  cyanocobalamin Injectable 1000 MICROGram(s) IntraMuscular <User Schedule>  dextrose 5%. 1000 milliLiter(s) (75 mL/Hr) IV Continuous <Continuous>  folic acid 1 milliGRAM(s) Oral daily  influenza   Vaccine 0.5 milliLiter(s) IntraMuscular once  multivitamin 1 Tablet(s) Oral daily  pantoprazole    Tablet 40 milliGRAM(s) Oral before breakfast  thiamine 100 milliGRAM(s) Oral daily    MEDICATIONS  (PRN):  LORazepam   Injectable 1 milliGRAM(s) IV Push every 2 hours PRN ciwa of 8 or greater      __________________________________________________  REVIEW OF SYSTEMS:    CONSTITUTIONAL: No fever,   EYES: no acute visual disturbances  NECK: No pain or stiffness  RESPIRATORY: No cough; No shortness of breath  CARDIOVASCULAR: No chest pain, no palpitations  GASTROINTESTINAL: No pain. No nausea or vomiting; No diarrhea   NEUROLOGICAL: No headache or numbness, no tremors  MUSCULOSKELETAL: No joint pain, no muscle pain  GENITOURINARY: no dysuria, no frequency, no hesitancy  PSYCHIATRY: no depression , no anxiety  ALL OTHER  ROS negative        Vital Signs Last 24 Hrs  T(C): 37.1 (21 Mar 2022 05:10), Max: 37.1 (20 Mar 2022 12:46)  T(F): 98.8 (21 Mar 2022 05:10), Max: 98.8 (21 Mar 2022 05:10)  HR: 86 (21 Mar 2022 05:10) (86 - 93)  BP: 126/79 (21 Mar 2022 05:10) (117/70 - 126/79)  BP(mean): --  RR: 18 (21 Mar 2022 05:10) (18 - 19)  SpO2: 98% (21 Mar 2022 05:10) (98% - 98%)    ________________________________________________  PHYSICAL EXAM:  GENERAL: NAD  HEENT: Normocephalic;  conjunctivae and sclerae clear; moist mucous membranes;   NECK : supple  CHEST/LUNG: Clear to auscultation bilaterally with good air entry   HEART: S1 S2  regular; no murmurs, gallops or rubs  ABDOMEN: Soft, Nontender, Nondistended; Bowel sounds present  EXTREMITIES: no cyanosis; no edema; no calf tenderness  SKIN: warm and dry; no rash  NERVOUS SYSTEM:  Awake and alert; Oriented  to place, person and time ; no new deficits    _________________________________________________  LABS:                        15.0   7.13  )-----------( 122      ( 20 Mar 2022 06:21 )             43.2     03-20    138  |  107  |  11  ----------------------------<  74  3.9   |  20<L>  |  0.86    Ca    9.2      20 Mar 2022 06:21    TPro  7.0  /  Alb  3.1<L>  /  TBili  1.2  /  DBili  x   /  AST  41<H>  /  ALT  60  /  AlkPhos  81  03-20        CAPILLARY BLOOD GLUCOSE    RADIOLOGY & ADDITIONAL TESTS:    < from: US Abdomen Upper Quadrant Right (03.14.22 @ 15:40) >    ACC: [id number] EXAM:  US ABDOMEN RT UPR QUADRANT                          PROCEDURE DATE:  03/14/2022          INTERPRETATION:  CLINICAL INFORMATION: Elevated liver enzymes    COMPARISON: None available.    TECHNIQUE: Sonography of the right upper quadrant.    FINDINGS:    Liver: Increased echogenicity. Enlarged (17.9 cm). Patent main portal   vein with normal flow direction.  Bile ducts: Normal caliber. Common bile duct measures 4.6 mm.  Gallbladder: Within normal limits.  Pancreas: Visualized portions are within normal limits.  Right kidney: 10.8 cm. No hydronephrosis.  Ascites: None.  IVC: Visualized portions are within normal limits.    IMPRESSION:    Hepatic steatosis.  No cholelithiasis or biliary ductal dilatation.    < end of copied text >  < from: MR Thoracic Spine No Cont (03.14.22 @ 14:04) >    ACC: [id number] EXAM:  MR SPINE LUMBAR                        ACC: [id number] EXAM:  MR SPINE THORACIC                          PROCEDURE DATE:  03/14/2022          INTERPRETATION:  MR thoracic and lumbar without gadolinium    CLINICAL INDICATION: Lower extremity weakness and decreased sensation    TECHNIQUE:   Sagittal  and axial T1-weighted images, sagittal STIR   images,  and sagittal and axial T2-weighted images of the thoracic spine   were obtained.    FINDINGS:   No prior similar studies are available for review.    Thoracic and lumbar vertebral alignment is significant for a slightly   exaggerated thoracic kyphosis.  Thoracic and lumbar vertebral body   heights are maintained.  Marrow signal intensity within thoracic and   lumbar vertebral bodies and posterior elements is unremarkable.  No   osseous expansion, epidural disease or paraspinal abnormality is found.    Thoracic and lumbar intervertebral discs show disc degeneration and   spondylosis at T6-7 through T11-T12 as well as L3-4 through L5-S1 with   loss of disc height and signal within the nucleus pulposus. Disc bulges   are noted at these levels which minimally flatten the ventral thecal sac   disc herniation noted arachnoid space.    The thoracic cord maintains intact morphology.  Thoracic cord signal   intensity is intact. The conus medullaris terminates at T12.      IMPRESSION: Moderate disc degeneration and spondylosis at T6-7 through   T11-T12 as well as L3-4 through L5-S1 with loss of disc height and signal   within the nucleus pulposus. Disc bulges are noted at these levels which   minimally flatten the ventral thecal sac disc herniation noted arachnoid   space.      --- End of Report ---    < end of copied text >        Imaging Personally Reviewed:  YES/NO    Consultant(s) Notes Reviewed:   YES/ No    Care Discussed with Consultants :     Plan of care was discussed with patient and /or primary care giver; all questions and concerns were addressed and care was aligned with patient's wishes.     NP Note discussed with  Primary Attending    Patient is a 56y old  Male who presents with a chief complaint of Weakness / Ataxia / Possible alcohol intoxication (20 Mar 2022 14:49)      INTERVAL HPI/OVERNIGHT EVENTS: no new complaints    MEDICATIONS  (STANDING):  artificial  tears Solution 1 Drop(s) Both EYES two times a day  chlordiazePOXIDE 25 milliGRAM(s) Oral every 12 hours  cyanocobalamin Injectable 1000 MICROGram(s) IntraMuscular <User Schedule>  dextrose 5%. 1000 milliLiter(s) (75 mL/Hr) IV Continuous <Continuous>  folic acid 1 milliGRAM(s) Oral daily  influenza   Vaccine 0.5 milliLiter(s) IntraMuscular once  multivitamin 1 Tablet(s) Oral daily  pantoprazole    Tablet 40 milliGRAM(s) Oral before breakfast  thiamine 100 milliGRAM(s) Oral daily    MEDICATIONS  (PRN):  LORazepam   Injectable 1 milliGRAM(s) IV Push every 2 hours PRN ciwa of 8 or greater      __________________________________________________  REVIEW OF SYSTEMS:    CONSTITUTIONAL: No fever,   EYES: no acute visual disturbances  NECK: No pain or stiffness  RESPIRATORY: No cough; No shortness of breath  CARDIOVASCULAR: No chest pain, no palpitations  GASTROINTESTINAL: No pain. No nausea or vomiting; No diarrhea   NEUROLOGICAL: No headache or numbness, no tremors  MUSCULOSKELETAL: No joint pain, no muscle pain  GENITOURINARY: no dysuria, no frequency, no hesitancy  PSYCHIATRY: no depression , no anxiety  ALL OTHER  ROS negative        Vital Signs Last 24 Hrs  T(C): 37.1 (21 Mar 2022 05:10), Max: 37.1 (20 Mar 2022 12:46)  T(F): 98.8 (21 Mar 2022 05:10), Max: 98.8 (21 Mar 2022 05:10)  HR: 86 (21 Mar 2022 05:10) (86 - 93)  BP: 126/79 (21 Mar 2022 05:10) (117/70 - 126/79)  BP(mean): --  RR: 18 (21 Mar 2022 05:10) (18 - 19)  SpO2: 98% (21 Mar 2022 05:10) (98% - 98%)    ________________________________________________  PHYSICAL EXAM:  well developed,   GENERAL: NAD  HEENT: Normocephalic;  conjunctivae and sclerae clear; moist mucous membranes;   NECK : supple  CHEST/LUNG: Clear to auscultation bilaterally with good air entry   HEART: S1 S2  regular; no murmurs, gallops or rubs  ABDOMEN: Soft, Nontender, Nondistended; Bowel sounds present  EXTREMITIES: no cyanosis; no edema; no calf tenderness  SKIN: warm and dry; no rash  NERVOUS SYSTEM:  Awake and alert; Oriented  to place, person and time ; no new deficits    _________________________________________________  LABS:                        15.0   7.13  )-----------( 122      ( 20 Mar 2022 06:21 )             43.2     03-20    138  |  107  |  11  ----------------------------<  74  3.9   |  20<L>  |  0.86    Ca    9.2      20 Mar 2022 06:21    TPro  7.0  /  Alb  3.1<L>  /  TBili  1.2  /  DBili  x   /  AST  41<H>  /  ALT  60  /  AlkPhos  81  03-20        CAPILLARY BLOOD GLUCOSE    RADIOLOGY & ADDITIONAL TESTS:    < from: US Abdomen Upper Quadrant Right (03.14.22 @ 15:40) >    ACC: [id number] EXAM:  US ABDOMEN RT UPR QUADRANT                          PROCEDURE DATE:  03/14/2022          INTERPRETATION:  CLINICAL INFORMATION: Elevated liver enzymes    COMPARISON: None available.    TECHNIQUE: Sonography of the right upper quadrant.    FINDINGS:    Liver: Increased echogenicity. Enlarged (17.9 cm). Patent main portal   vein with normal flow direction.  Bile ducts: Normal caliber. Common bile duct measures 4.6 mm.  Gallbladder: Within normal limits.  Pancreas: Visualized portions are within normal limits.  Right kidney: 10.8 cm. No hydronephrosis.  Ascites: None.  IVC: Visualized portions are within normal limits.    IMPRESSION:    Hepatic steatosis.  No cholelithiasis or biliary ductal dilatation.    < end of copied text >  < from: MR Thoracic Spine No Cont (03.14.22 @ 14:04) >    ACC: [id number] EXAM:  MR SPINE LUMBAR                        ACC: [id number] EXAM:  MR SPINE THORACIC                          PROCEDURE DATE:  03/14/2022          INTERPRETATION:  MR thoracic and lumbar without gadolinium    CLINICAL INDICATION: Lower extremity weakness and decreased sensation    TECHNIQUE:   Sagittal  and axial T1-weighted images, sagittal STIR   images,  and sagittal and axial T2-weighted images of the thoracic spine   were obtained.    FINDINGS:   No prior similar studies are available for review.    Thoracic and lumbar vertebral alignment is significant for a slightly   exaggerated thoracic kyphosis.  Thoracic and lumbar vertebral body   heights are maintained.  Marrow signal intensity within thoracic and   lumbar vertebral bodies and posterior elements is unremarkable.  No   osseous expansion, epidural disease or paraspinal abnormality is found.    Thoracic and lumbar intervertebral discs show disc degeneration and   spondylosis at T6-7 through T11-T12 as well as L3-4 through L5-S1 with   loss of disc height and signal within the nucleus pulposus. Disc bulges   are noted at these levels which minimally flatten the ventral thecal sac   disc herniation noted arachnoid space.    The thoracic cord maintains intact morphology.  Thoracic cord signal   intensity is intact. The conus medullaris terminates at T12.      IMPRESSION: Moderate disc degeneration and spondylosis at T6-7 through   T11-T12 as well as L3-4 through L5-S1 with loss of disc height and signal   within the nucleus pulposus. Disc bulges are noted at these levels which   minimally flatten the ventral thecal sac disc herniation noted arachnoid   space.      --- End of Report ---    < end of copied text >        Imaging Personally Reviewed:  YES/NO    Consultant(s) Notes Reviewed:   YES/ No    Care Discussed with Consultants :     Plan of care was discussed with patient and /or primary care giver; all questions and concerns were addressed and care was aligned with patient's wishes.

## 2022-03-21 NOTE — BH CONSULTATION LIAISON ASSESSMENT NOTE - CURRENT MEDICATION
MEDICATIONS  (STANDING):  artificial  tears Solution 1 Drop(s) Both EYES two times a day  chlordiazePOXIDE 25 milliGRAM(s) Oral every 12 hours  cyanocobalamin Injectable 1000 MICROGram(s) IntraMuscular <User Schedule>  dextrose 5%. 1000 milliLiter(s) (75 mL/Hr) IV Continuous <Continuous>  folic acid 1 milliGRAM(s) Oral daily  influenza   Vaccine 0.5 milliLiter(s) IntraMuscular once  multivitamin 1 Tablet(s) Oral daily  pantoprazole    Tablet 40 milliGRAM(s) Oral before breakfast  thiamine 100 milliGRAM(s) Oral daily    MEDICATIONS  (PRN):  LORazepam   Injectable 1 milliGRAM(s) IV Push every 2 hours PRN ciwa of 8 or greater

## 2022-03-21 NOTE — PROGRESS NOTE ADULT - ATTENDING COMMENTS
#Alcohol Withdrawal with Delirium Tremens  #Wernicke's Encephalopathy  #Thrombocytopenia    55yo M PMHx of Cirrhosis who presented with alcohol withdrawal complicated by delirium tremens requiring ICU care, now downgraded to the floors on a librium taper. Patient reports multiple stressors in life leading to depression and alcohol abuse. Patient reports lower extremity weakness. MRI without obvious structural abnormality, most likely due to chronic alcohol abuse. Patient likely medically ready for discharge by tomorrow, would like to go to alcohol rehab. Expressed depressive thoughts, pending psychiatric evaluation.    I performed the MDM.

## 2022-03-21 NOTE — BH CONSULTATION LIAISON ASSESSMENT NOTE - NSBHCONSULTRECOMMENDOTHER_PSY_A_CORE FT
1. Pt is psych cleared for HI and SI and does not need to be on CO 1:1 observation  2. Medical and withdrawal management as directed by primary team  --Pt due to receive last dose of Librium 25 mg tonight @8 pm. After that, would DC ALL withdrawal medications as well as CIWA and associated PRNs, would NOT restart  3. For SEVERE acute agitation, recommend Haldol 1 mg PO, IV or IM q12h PRN severe agitation (USE VERY SPARINGLY)  4. Psychiatry will continue to follow  5. Request SW assistance in motivationally interviewing pt to encourage engagement in IP or OP rehab in [place] upon DC  6. Case d/w DEWAYNE Oneil and Dr. Zhang of primary team    Lori Chau MD  Director, Consultation-Liaison Psychiatry Service  [phone]

## 2022-03-21 NOTE — PROGRESS NOTE ADULT - PROBLEM SELECTOR PLAN 1
likely due to Wernicke's Encephalopathy and peripheral neuropathy per neuro  -  CT head didn't show any acute deficits or hemorrhage.  -  MRI thoracic and lumbar showed Moderate disc degeneration and spondylosis at T6-7 through T11-T12 as well as L3-4 through L5-S1 with loss of disc height and signal   within the nucleus pulposus. Disc bulges are noted at these levels which minimally flatten the ventral thecal sac disc herniation noted arachnoid   space.  - Cont thiamine 100mg PO daily  - Cont folic acid 1 mg daily  - PT recc home with home PT

## 2022-03-21 NOTE — PROGRESS NOTE ADULT - PROBLEM SELECTOR PLAN 2
-  Patient has hx of alcoholic use   -  Unknown how much he drinks  -  Alcohol level of 23 on admission, urine drug screen negative  -  continue CIWA protocol with Librium taper (last dose tomorrow 3/22)  -  Cont prn Ativan for extreme agitation  -  Follow up with  social work for ETOH resources

## 2022-03-21 NOTE — ADVANCED PRACTICE NURSE CONSULT - ASSESSMENT
This is a 56yr old male patient admitted for Weakness, presenting with blanchable erythema to the Bilateral Gluteus & Ischium

## 2022-03-21 NOTE — PROGRESS NOTE ADULT - PROBLEM SELECTOR PLAN 6
-  PT recommend home with home PT when medically cleared for discharge  -  Patient reports living in [place] with brother,  not in [place] with ex wife,  currently unsure of where he would go when medically stable for discharge.

## 2022-03-21 NOTE — BH CONSULTATION LIAISON ASSESSMENT NOTE - NSBHCHARTREVIEWLAB_PSY_A_CORE FT
15.0   7.13  )-----------( 122      ( 20 Mar 2022 06:21 )             43.2   03-20    138  |  107  |  11  ----------------------------<  74  3.9   |  20<L>  |  0.86    Ca    9.2      20 Mar 2022 06:21    TPro  7.0  /  Alb  3.1<L>  /  TBili  1.2  /  DBili  x   /  AST  41<H>  /  ALT  60  /  AlkPhos  81  03-20

## 2022-03-21 NOTE — BH CONSULTATION LIAISON ASSESSMENT NOTE - HPI (INCLUDE ILLNESS QUALITY, SEVERITY, DURATION, TIMING, CONTEXT, MODIFYING FACTORS, ASSOCIATED SIGNS AND SYMPTOMS)
56M, unemployed, normally lives in [place] with wife and 2 adult children, with PHx of alcohol use DO and MHx of back pain, BIBEMS 3/13 after being FOF of brother's apt in [place] and admitted for alcohol withdrawal, currently on Librium taper scheduled to end on 3/22. Psych consulted today after staff reported pt said he wanted to kill his brother. Pt seen in his room for eval, found lying in bed asleep but arousable to voice and light touch after multiple attempts. When MD introduces self and attempts to ask about pt's reported statement, he reacts with extreme confusion and appears not to know where he is. When MD tells pt he has been here since 3/13, he disputes this, insisting he was at "the other [facility]" only this morning and that his brother had dropped him off here "only this morning." MD shows pt his admission date on his patient bracelet, but pt insists the hospital's records must be "wrong." When MD redirects conversation to pt's alleged statement about wanting to kill his brother, pt says, "I don't remember saying anything like that, and if I did, it would have been a joke. My brother knows I would never want to kill him." At pt's insistence, MD calls pt's brother Ck from bedside to resolve the confusion. Brother reports that pt normally lives in [place] with his wife and 2 children, but sometimes comes to [place] to see him, after which he normally returns home. Just PTA, pt had come to Ck's home (a private house where their late mother, who  in 2021, lived on a separate floor) to help clear out mother's belongings in preparation for sale. Ck found pt on the floor and called 911. Ck confirms MD's statements that pt has been at this hospital and nowhere else since 3/13. Ck is asked if he knew anything about pt wanting to kill him; Ck says no, but reports that pt has made similar statements in the past while acutely intoxicated, although he has never acted on them. Ck says that he does not feel able to care for pt or cope with his drinking problems, and believes that pt would be best served by returning home to [place] to live with his family 56M, unemployed, normally lives in [place] wife and 2 adult children, with PHx of alcohol use DO and MHx of back pain, BIBEMS 3/13 after being FOF of brother's apt in [place] and admitted for alcohol withdrawal, currently on Librium taper scheduled to end on 3/22. Psych consulted today after staff reported pt said he wanted to kill his brother. Pt seen in his room for eval, found lying in bed asleep but arousable to voice and light touch after multiple attempts. When MD introduces self and attempts to ask about pt's reported statement, he reacts with extreme confusion and appears not to know where he is. When MD tells pt he has been here since 3/13, he disputes this, insisting he was at "the other [facility]" only this morning and that his brother had dropped him off here "only this morning." MD shows pt his admission date on his patient bracelet, but pt insists the hospital's records must be "wrong." When MD redirects conversation to pt's alleged statement about wanting to kill his brother, pt says, "I don't remember saying anything like that, and if I did, it would have been a joke. My brother knows I would never want to kill him." At pt's insistence, MD calls pt's brother Ck from bedside to resolve the confusion. Brother reports that pt normally lives in [place] with his wife and 2 children, but sometimes comes to [place] to see him, after which he normally returns home. Just PTA, pt had come to Ck's home (a private house where their late mother, who  in 2021, lived on a separate floor) to help clear out mother's belongings in preparation for sale. Ck found pt on the floor and called 911. Ck confirms MD's statements that pt has been at this hospital and nowhere else since 3/13. Ck is asked if he knew anything about pt wanting to kill him; Ck says no, but reports that pt has made similar statements in the past while acutely intoxicated, although he has never acted on them. Ck says that he does not feel able to care for pt or cope with his drinking problems, and believes that pt would be best served by returning home to [place] to live with his family. MD tells pt and Ck that pt's mental status cannot be meaningfully assessed until he is off all withdrawal medications, but that it seems clear that pt would benefit from alcohol rehab to help him maintain sobriety. Pt says he will "think about it."    Later in day, MD speaks by phone to pt's wife, who has called requesting update on pt's care. MD provides update including today's developments. Wife says that although she and pt have had marital problems which are mostly about pt's drinking, she agrees with GABRIELA's opinion that pt would be better off returning home, provided he agrees to attend some sort of rehab (IP or OP). Wife agrees to make inquiries as to where pt might be able to go in [place], and to use her best efforts to convince pt to attend rehab. 56M, unemployed, normally lives in [place] with wife and 2 adult children, with PHx of alcohol use DO and MHx of back pain, BIBEMS 3/13 after being FOF of brother's apt in [place] and admitted for alcohol withdrawal, currently on Librium taper scheduled to end on evening of 3/21. Psych consulted today after staff reported pt said he wanted to kill his brother. Pt seen in his room for eval, found lying in bed asleep but arousable to voice and light touch after multiple attempts. When MD introduces self and attempts to ask about pt's reported statement, he reacts with extreme confusion and appears not to know where he is. When MD tells pt he has been here since 3/13, he disputes this, insisting he was at "the other [facility] on [facility]" only this morning and that his brother had dropped him off here "only this morning." MD shows pt his admission date on his patient bracelet, but pt insists the hospital's records must be "wrong." When MD redirects conversation to pt's alleged statement about wanting to kill his brother, pt says, "I don't remember saying anything like that, and if I did, it would have been a joke. My brother knows I would never want to kill him." At pt's insistence, MD calls pt's brother Ck from bedside to resolve the confusion. Brother reports that pt normally lives in [place] with his wife and 2 children, but sometimes comes to [place] to see him, after which he normally returns home. Just PTA, pt had come to Ck's home (a private house where their late mother, who  in 2021, lived on a separate floor) to help clear out mother's belongings in preparation for sale. Ck found pt on the floor and called 911. Ck confirms MD's statements that pt has been at this hospital and nowhere else since 3/13. Ck is asked if he knew anything about pt wanting to kill him; Ck says no, but reports that pt has made similar statements in the past while acutely intoxicated, although he has never acted on them. Ck says that he does not feel able to care for pt or cope with his drinking problems, and believes that pt would be best served by returning home to [place] to live with his family. MD tells pt and Ck that pt's mental status cannot be meaningfully assessed until he is off all withdrawal medications, but that it seems clear that pt would benefit from alcohol rehab to help him maintain sobriety. Pt says he will "think about it."    Later in day, MD speaks by phone to pt's wife, who has called requesting update on pt's care. MD provides update including today's developments. Wife says that although she and pt have had marital problems which are mostly about pt's drinking, she agrees with GABRIELA's opinion that pt would be better off returning home, provided he agrees to attend some sort of rehab (IP or OP). Wife agrees to make inquiries as to where pt might be able to go in [place], and to use her best efforts to convince pt to attend rehab.

## 2022-03-21 NOTE — BH CONSULTATION LIAISON ASSESSMENT NOTE - RISK ASSESSMENT
Risk factors: Alcohol dependence, unemployed, marital tensions. Protective factors: Absent h/o SI/SA/SIB, stable domicile with supportive family. Acute risk level appears low at the time of assessment, but chronic risk may be mildly elevated due to above risk factors.

## 2022-03-21 NOTE — BH CONSULTATION LIAISON ASSESSMENT NOTE - NSBHCHARTREVIEWVS_PSY_A_CORE FT
Vital Signs Last 24 Hrs  T(C): 36.4 (21 Mar 2022 13:16), Max: 37.1 (21 Mar 2022 05:10)  T(F): 97.5 (21 Mar 2022 13:16), Max: 98.8 (21 Mar 2022 05:10)  HR: 91 (21 Mar 2022 13:16) (86 - 93)  BP: 121/76 (21 Mar 2022 13:16) (117/70 - 126/79)  BP(mean): --  RR: 18 (21 Mar 2022 13:16) (18 - 19)  SpO2: 96% (21 Mar 2022 13:16) (96% - 98%)

## 2022-03-21 NOTE — BH CONSULTATION LIAISON ASSESSMENT NOTE - DESCRIPTION
, lives in [place], [place] with wife and 2 children (son, 29 and daughter, 25). 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 Mother  in 2021. Sometimes stays with brother Ck who occupies half of former family home in [place].

## 2022-03-22 LAB
ALBUMIN SERPL ELPH-MCNC: 3.2 G/DL — LOW (ref 3.5–5)
ALP SERPL-CCNC: 73 U/L — SIGNIFICANT CHANGE UP (ref 40–120)
ALT FLD-CCNC: 55 U/L DA — SIGNIFICANT CHANGE UP (ref 10–60)
ANION GAP SERPL CALC-SCNC: 9 MMOL/L — SIGNIFICANT CHANGE UP (ref 5–17)
AST SERPL-CCNC: 39 U/L — SIGNIFICANT CHANGE UP (ref 10–40)
BILIRUB SERPL-MCNC: 1.1 MG/DL — SIGNIFICANT CHANGE UP (ref 0.2–1.2)
BUN SERPL-MCNC: 9 MG/DL — SIGNIFICANT CHANGE UP (ref 7–18)
CALCIUM SERPL-MCNC: 9.2 MG/DL — SIGNIFICANT CHANGE UP (ref 8.4–10.5)
CHLORIDE SERPL-SCNC: 107 MMOL/L — SIGNIFICANT CHANGE UP (ref 96–108)
CO2 SERPL-SCNC: 22 MMOL/L — SIGNIFICANT CHANGE UP (ref 22–31)
CREAT SERPL-MCNC: 0.73 MG/DL — SIGNIFICANT CHANGE UP (ref 0.5–1.3)
EGFR: 107 ML/MIN/1.73M2 — SIGNIFICANT CHANGE UP
GLUCOSE SERPL-MCNC: 82 MG/DL — SIGNIFICANT CHANGE UP (ref 70–99)
HCT VFR BLD CALC: 42.5 % — SIGNIFICANT CHANGE UP (ref 39–50)
HGB BLD-MCNC: 15 G/DL — SIGNIFICANT CHANGE UP (ref 13–17)
MCHC RBC-ENTMCNC: 35 PG — HIGH (ref 27–34)
MCHC RBC-ENTMCNC: 35.3 GM/DL — SIGNIFICANT CHANGE UP (ref 32–36)
MCV RBC AUTO: 99.1 FL — SIGNIFICANT CHANGE UP (ref 80–100)
NRBC # BLD: 0 /100 WBCS — SIGNIFICANT CHANGE UP (ref 0–0)
PLATELET # BLD AUTO: 172 K/UL — SIGNIFICANT CHANGE UP (ref 150–400)
POTASSIUM SERPL-MCNC: 3.5 MMOL/L — SIGNIFICANT CHANGE UP (ref 3.5–5.3)
POTASSIUM SERPL-SCNC: 3.5 MMOL/L — SIGNIFICANT CHANGE UP (ref 3.5–5.3)
PROT SERPL-MCNC: 6.9 G/DL — SIGNIFICANT CHANGE UP (ref 6–8.3)
RBC # BLD: 4.29 M/UL — SIGNIFICANT CHANGE UP (ref 4.2–5.8)
RBC # FLD: 11.2 % — SIGNIFICANT CHANGE UP (ref 10.3–14.5)
SODIUM SERPL-SCNC: 138 MMOL/L — SIGNIFICANT CHANGE UP (ref 135–145)
WBC # BLD: 5.75 K/UL — SIGNIFICANT CHANGE UP (ref 3.8–10.5)
WBC # FLD AUTO: 5.75 K/UL — SIGNIFICANT CHANGE UP (ref 3.8–10.5)

## 2022-03-22 PROCEDURE — 99232 SBSQ HOSP IP/OBS MODERATE 35: CPT

## 2022-03-22 RX ORDER — TOBRAMYCIN 0.3 %
1 DROPS OPHTHALMIC (EYE) EVERY 8 HOURS
Refills: 0 | Status: DISCONTINUED | OUTPATIENT
Start: 2022-03-22 | End: 2022-03-22

## 2022-03-22 RX ORDER — TOBRAMYCIN 0.3 %
1 DROPS OPHTHALMIC (EYE) EVERY 4 HOURS
Refills: 0 | Status: DISCONTINUED | OUTPATIENT
Start: 2022-03-22 | End: 2022-03-28

## 2022-03-22 RX ADMIN — Medication 25 MILLIGRAM(S): at 08:11

## 2022-03-22 RX ADMIN — Medication 25 MILLIGRAM(S): at 22:03

## 2022-03-22 RX ADMIN — Medication 1 DROP(S): at 17:44

## 2022-03-22 RX ADMIN — Medication 1 MILLIGRAM(S): at 11:58

## 2022-03-22 RX ADMIN — Medication 1 DROP(S): at 06:19

## 2022-03-22 RX ADMIN — Medication 100 MILLIGRAM(S): at 11:58

## 2022-03-22 RX ADMIN — Medication 1 DROP(S): at 13:45

## 2022-03-22 RX ADMIN — PANTOPRAZOLE SODIUM 40 MILLIGRAM(S): 20 TABLET, DELAYED RELEASE ORAL at 06:19

## 2022-03-22 RX ADMIN — Medication 1 TABLET(S): at 11:57

## 2022-03-22 NOTE — BH CONSULTATION LIAISON PROGRESS NOTE - NSBHFUPINTERVALHXFT_PSY_A_CORE
Pt seen today for f/u, found lying in bed awake and alert. This time, pt is fully oriented, describes mood as "bored" and denies SI/HI/AVH. MD speaks frankly to pt about the seriousness of his alcohol use DO and importance of attending rehab in [place], an opinion that is shared by MD, pt's wife and his brother. Pt says, "I think you're right," and states that he is open to attending rehab, adding that he would prefer OP to IP rehab. MD tells pt it is likely he will be DC'd tomorrow, provided a suitable program can be found. Later in day, pt's wife calls and reports she just spoke to pt, who agreed to attend rehab, but told wife he is being DC'd today. MD assures wife that this is not the case. Wife reports she has found a rehab program in [facility] which is fully covered by her insurance, but it is an IP rather than OP program. MD recommends wife call pt and explain that this is the only viable option. Wife agrees.

## 2022-03-22 NOTE — PROGRESS NOTE ADULT - PROBLEM SELECTOR PLAN 1
likely due to Wernicke's Encephalopathy and peripheral neuropathy per neuro  -  CT head didn't show any acute deficits or hemorrhage.  -  MRI thoracic and lumbar showed Moderate disc degeneration and spondylosis at T6-7 through T11-T12 as well as L3-4 through L5-S1 with loss of disc height and signal   within the nucleus pulposus. Disc bulges are noted at these levels which minimally flatten the ventral thecal sac disc herniation noted arachnoid   space.  - Cont thiamine 100mg PO daily  - Cont folic acid 1 mg daily  - PT recc home with home PT  - Needs new PT eval due to increased weakness

## 2022-03-22 NOTE — PROGRESS NOTE ADULT - PROBLEM SELECTOR PLAN 2
-  Patient has hx of alcoholic use   -  Unknown how much he drinks  -  Alcohol level of 23 on admission, urine drug screen negative  -  continue CIWA protocol with Librium taper (last dose tonight 10pm 3/22)  -  Ativan d/c'd  -  Follow up with  social work and psych for ETOH resources  -  Pt. to be reevaluated by psych prior to discharge

## 2022-03-22 NOTE — PROGRESS NOTE ADULT - SUBJECTIVE AND OBJECTIVE BOX
NP Note discussed with  Primary Attending    Patient is a 56y old  Male who presents with a chief complaint of Weakness / Ataxia / Possible alcohol intoxication (21 Mar 2022 09:56)      INTERVAL HPI/OVERNIGHT EVENTS: no new complaints    MEDICATIONS  (STANDING):  artificial  tears Solution 1 Drop(s) Both EYES two times a day  chlordiazePOXIDE 25 milliGRAM(s) Oral once  cyanocobalamin Injectable 1000 MICROGram(s) IntraMuscular <User Schedule>  dextrose 5%. 1000 milliLiter(s) (75 mL/Hr) IV Continuous <Continuous>  folic acid 1 milliGRAM(s) Oral daily  influenza   Vaccine 0.5 milliLiter(s) IntraMuscular once  multivitamin 1 Tablet(s) Oral daily  pantoprazole    Tablet 40 milliGRAM(s) Oral before breakfast  thiamine 100 milliGRAM(s) Oral daily    MEDICATIONS  (PRN):      __________________________________________________  REVIEW OF SYSTEMS:    CONSTITUTIONAL: No fever,   EYES: no acute visual disturbances  NECK: No pain or stiffness  RESPIRATORY: No cough; No shortness of breath  CARDIOVASCULAR: No chest pain, no palpitations  GASTROINTESTINAL: No pain. No nausea or vomiting; No diarrhea   NEUROLOGICAL: No headache or numbness, no tremors  MUSCULOSKELETAL: No joint pain, no muscle pain  GENITOURINARY: no dysuria, no frequency, no hesitancy  PSYCHIATRY: no depression , no anxiety  ALL OTHER  ROS negative        Vital Signs Last 24 Hrs  T(C): 36.8 (22 Mar 2022 05:10), Max: 36.8 (22 Mar 2022 05:10)  T(F): 98.3 (22 Mar 2022 05:10), Max: 98.3 (22 Mar 2022 05:10)  HR: 82 (22 Mar 2022 05:10) (82 - 91)  BP: 109/73 (22 Mar 2022 05:10) (109/73 - 123/73)  BP(mean): --  RR: 17 (22 Mar 2022 05:10) (17 - 20)  SpO2: 96% (22 Mar 2022 05:10) (96% - 100%)    ________________________________________________  PHYSICAL EXAM:  well developed  GENERAL: NAD  HEENT: Normocephalic;  conjunctivae and sclerae clear; moist mucous membranes;   NECK : supple  CHEST/LUNG: Clear to auscultation bilaterally with good air entry   HEART: S1 S2  regular; no murmurs, gallops or rubs  ABDOMEN: Soft, Nontender, Nondistended; Bowel sounds present  EXTREMITIES: no cyanosis; no edema; no calf tenderness  SKIN: warm and dry; no rash  NERVOUS SYSTEM:  Awake and alert; Confused to time and place, oriented to person    _________________________________________________  LABS:                        15.0   5.75  )-----------( 172      ( 22 Mar 2022 08:15 )             42.5     03-22    138  |  107  |  9   ----------------------------<  82  3.5   |  22  |  0.73    Ca    9.2      22 Mar 2022 08:15    TPro  6.9  /  Alb  3.2<L>  /  TBili  1.1  /  DBili  x   /  AST  39  /  ALT  55  /  AlkPhos  73  03-22        CAPILLARY BLOOD GLUCOSE    RADIOLOGY & ADDITIONAL TESTS:        Imaging Personally Reviewed:  YES/NO    Consultant(s) Notes Reviewed:   YES/ No    Care Discussed with Consultants :     Plan of care was discussed with patient and /or primary care giver; all questions and concerns were addressed and care was aligned with patient's wishes.

## 2022-03-22 NOTE — BH CONSULTATION LIAISON PROGRESS NOTE - NSBHCONSULTRECOMMENDOTHER_PSY_A_CORE FT
1. Pt is psych cleared for HI and SI and does not need to be on CO 1:1 observation  2. Medical and withdrawal management as directed by primary team  --Pt due to receive last dose of Librium 25 mg tonight @8 pm. After that, would DC ALL withdrawal medications as well as CIWA and associated PRNs, would NOT restart  3. For SEVERE acute agitation, recommend Haldol 1 mg PO, IV or IM q12h PRN severe agitation (USE VERY SPARINGLY)  4. Psychiatry will continue to follow  5. Request SW contact wife regarding dispo to  [facility] in [place] at program identified by wife  6. Case d/w DEWAYNE Oneil of primary team    Lori Chau MD  Director, Consultation-Liaison Psychiatry Service  [phone]

## 2022-03-22 NOTE — PROGRESS NOTE ADULT - PROBLEM SELECTOR PLAN 6
-  PT recommend home with home PT when medically cleared for discharge  -  Needs repeat PT eval as he has been in bed for several days  -  Patient reports living in [place] with brother,  not in [place] with ex wife,  currently unsure of where he would go when medically stable for discharge.  -  Spoke to spouse Debbi Pedro [phone].  Pt. has left it up to her to decide if he should go to inpatient substance abuse rehab.

## 2022-03-22 NOTE — PROGRESS NOTE ADULT - ASSESSMENT
57 y/o male with past medical history of chronic alcohol use was brought to ED by family member after he was found on floor in home today afternoon. Patient complaining of generalized weakness and he said "I didn't feel my legs". Patient reports that he fell many times in last month. Patient admits that he drinks alcohol occasionally and not sure when the last time he drunk. Pt was also found to have elevated liver enzymes and blood alcohol level of 23. Patient will be admitted to medicine for further management of alcohol withdrawal and generalized weakness, Neurology Dr. Tyler consulted.  CT head was negative for any acute abnormalities.   MRI thoracic and lumbar - Moderate disc degeneration and spondylosis at T6-7 through T11-T12 as well as L3-4 through L5-S1 with loss of disc height and signal   within the nucleus pulposus. Disc bulges are noted at these levels which minimally flatten the ventral thecal sac disc herniation noted arachnoid   space.  US RUQ- Hepatic steatosis. No cholelithiasis or biliary ductal dilatation.    3/21-Pt. is awake and alert confused about place and time, delirious likely in setting of Librium therapy.  Pt. seen by psych, will follow reccs.  3/22-Last Librium dose tonight, Ativan d/c'd, pt. somewhat more coherent today with no s&s of ETOH withdrawal.  Awaiting PT eval to determine disposition.  Spoke to pt.'s spouse who has spoken to psych re disposition.  Plan is for psych to reevaluate pt. once he is off Librium and Ativan to determine if he needs inpatient sub abuse rehab vs. [facility] vs. home.

## 2022-03-22 NOTE — BH CONSULTATION LIAISON PROGRESS NOTE - NSBHASSESSMENTFT_PSY_ALL_CORE
56M, unemployed, normally lives in [place] with wife and 2 adult children, with PHx of alcohol use DO and MHx of back pain, BIBEMS 3/13 after being FOF of brother's apt in [place] and admitted for alcohol withdrawal, currently on Librium taper scheduled to end on 3/22. Psych consulted today after staff reported pt said he wanted to kill his brother. On initial exam, pt presents oriented only to self and acutely delirious, insisting he is not at a medical hospital and was dropped off by his brother Ck at "some rehab place on [facility]" only a few hours ago. Pt denies any memory of saying he wanted to kill his brother, and denies any ideation, intent or plan to cause harm to brother or anyone else including himself. Given obvious severity of pt's delirium on today's interview, as well as collateral from pt's brother and wife which indicates that he has no h/o SI/HI or physical aggression, we strongly believe that pt's alleged statement was triggered by delirium and not by homicidal or aggressive intent. Accordingly, pt is psych cleared for HI as well as SI and does not appear to be in need of CO 1:1 observation. On f/u today, pt presents much more alert and fully oriented, and now states that he is willing to attend an [facility] rehab in [place] as recommended. Pt does not appear to present an acute risk of harm to self or others at the time of assessment, and does not appear to be in need of admission to  psych at the time of assessment.

## 2022-03-23 PROCEDURE — 99232 SBSQ HOSP IP/OBS MODERATE 35: CPT

## 2022-03-23 RX ORDER — FOLIC ACID 0.8 MG
1 TABLET ORAL
Qty: 30 | Refills: 0
Start: 2022-03-23 | End: 2022-04-21

## 2022-03-23 RX ORDER — PANTOPRAZOLE SODIUM 20 MG/1
1 TABLET, DELAYED RELEASE ORAL
Qty: 30 | Refills: 0
Start: 2022-03-23 | End: 2022-04-21

## 2022-03-23 RX ORDER — THIAMINE MONONITRATE (VIT B1) 100 MG
1 TABLET ORAL
Qty: 30 | Refills: 0
Start: 2022-03-23 | End: 2022-04-21

## 2022-03-23 RX ORDER — TOBRAMYCIN 0.3 %
1 DROPS OPHTHALMIC (EYE)
Qty: 0 | Refills: 0 | DISCHARGE
Start: 2022-03-23

## 2022-03-23 RX ORDER — PREGABALIN 225 MG/1
1 CAPSULE ORAL
Qty: 30 | Refills: 0
Start: 2022-03-23 | End: 2022-04-21

## 2022-03-23 RX ADMIN — Medication 100 MILLIGRAM(S): at 12:39

## 2022-03-23 RX ADMIN — Medication 1 DROP(S): at 18:45

## 2022-03-23 RX ADMIN — PANTOPRAZOLE SODIUM 40 MILLIGRAM(S): 20 TABLET, DELAYED RELEASE ORAL at 06:21

## 2022-03-23 RX ADMIN — Medication 1 DROP(S): at 06:20

## 2022-03-23 RX ADMIN — Medication 1 DROP(S): at 11:13

## 2022-03-23 RX ADMIN — Medication 1 DROP(S): at 15:00

## 2022-03-23 RX ADMIN — Medication 1 DROP(S): at 06:21

## 2022-03-23 RX ADMIN — Medication 1 DROP(S): at 21:40

## 2022-03-23 RX ADMIN — Medication 1 TABLET(S): at 12:39

## 2022-03-23 RX ADMIN — Medication 1 MILLIGRAM(S): at 12:39

## 2022-03-23 NOTE — BH CONSULTATION LIAISON PROGRESS NOTE - NSBHASSESSMENTFT_PSY_ALL_CORE
56M, unemployed, normally lives in [place] with wife and 2 adult children, with PHx of alcohol use DO and MHx of back pain, BIBEMS 3/13 after being FOF of brother's apt in [place] and admitted for alcohol withdrawal, currently on Librium taper scheduled to end on 3/22. Psych consulted today after staff reported pt said he wanted to kill his brother. On initial exam, pt presents oriented only to self and acutely delirious, insisting he is not at a medical hospital and was dropped off by his brother Ck at "some rehab place on [facility]" only a few hours ago. Pt denies any memory of saying he wanted to kill his brother, and denies any ideation, intent or plan to cause harm to brother or anyone else including himself. Given obvious severity of pt's delirium on initial interview, as well as collateral from pt's brother and wife which indicates that he has no h/o SI/HI or physical aggression, we strongly believe that pt's alleged statement was triggered by delirium and not by homicidal or aggressive intent. Accordingly, pt is psych cleared for HI as well as SI and does not appear to be in need of CO 1:1 observation. On f/u today, pt presents much more alert and fully oriented, and states that he is willing to attend an [facility] rehab in [place] as recommended. Pt does not appear to present an acute risk of harm to self or others at the time of assessment, and does not appear to be in need of admission to IP psych at the time of assessment.

## 2022-03-23 NOTE — PHYSICAL THERAPY INITIAL EVALUATION ADULT - LIVES WITH, PROFILE
Pt. reports that he was living with his brother in a  with stairs to negotiate but upon D/C will be in [place] with his wife and is also residing in a  with stairs to negotiate.
brother/other relative

## 2022-03-23 NOTE — PHYSICAL THERAPY INITIAL EVALUATION ADULT - IMPAIRMENTS CONTRIBUTING TO GAIT DEVIATIONS, PT EVAL
impaired balance/decreased flexibility/impaired postural control/decreased strength
impaired balance/decreased flexibility/decreased strength

## 2022-03-23 NOTE — BH CONSULTATION LIAISON PROGRESS NOTE - NSBHFUPINTERVALHXFT_PSY_A_CORE
Pt seen today for f/u, found lying in bed awake and alert. Pt is fully oriented, describes mood as "a little low" and denies SI/HI/AVH. MD asks if pt has discussed rehab in [place] with his wife; he says he has, and confirms that the rehab his wife has found is an IP rehab (25 days). Pt says he is willing to attend, but acknowledges that he feels "a little nervous" about doing so: "It's a big step." MD provides reassurance. Pt reports that he was seen by PT today and "I can barely walk." Pt says he was told that he needs PT until he is ambulatory enough to go to [facility].

## 2022-03-23 NOTE — PHYSICAL THERAPY INITIAL EVALUATION ADULT - DIAGNOSIS, PT EVAL
Overall decline in functional mobility due to weakness, decreased balance and endurance.
Pt. presents w/ weakness, decreased balance and unsteady gait. Pt. a high risk for fall.

## 2022-03-23 NOTE — PROGRESS NOTE ADULT - SUBJECTIVE AND OBJECTIVE BOX
NP Note discussed with  Primary Attending    Patient is a 56y old  Male who presents with a chief complaint of Weakness / Ataxia / Possible alcohol intoxication (22 Mar 2022 11:00)      INTERVAL HPI/OVERNIGHT EVENTS: no new complaints    MEDICATIONS  (STANDING):  artificial  tears Solution 1 Drop(s) Both EYES two times a day  cyanocobalamin Injectable 1000 MICROGram(s) IntraMuscular <User Schedule>  dextrose 5%. 1000 milliLiter(s) (75 mL/Hr) IV Continuous <Continuous>  folic acid 1 milliGRAM(s) Oral daily  influenza   Vaccine 0.5 milliLiter(s) IntraMuscular once  multivitamin 1 Tablet(s) Oral daily  pantoprazole    Tablet 40 milliGRAM(s) Oral before breakfast  thiamine 100 milliGRAM(s) Oral daily  tobramycin 0.3% Ophthalmic Solution 1 Drop(s) Right EYE every 4 hours    MEDICATIONS  (PRN):      __________________________________________________  REVIEW OF SYSTEMS:    CONSTITUTIONAL: No fever,   EYES: no acute visual disturbances  NECK: No pain or stiffness  RESPIRATORY: No cough; No shortness of breath  CARDIOVASCULAR: No chest pain, no palpitations  GASTROINTESTINAL: No pain. No nausea or vomiting; No diarrhea   NEUROLOGICAL: No headache or numbness, no tremors  MUSCULOSKELETAL: No joint pain, no muscle pain  GENITOURINARY: no dysuria, no frequency, no hesitancy  PSYCHIATRY: no depression , no anxiety  ALL OTHER  ROS negative        Vital Signs Last 24 Hrs  T(C): 37.1 (23 Mar 2022 05:26), Max: 37.1 (23 Mar 2022 05:26)  T(F): 98.8 (23 Mar 2022 05:26), Max: 98.8 (23 Mar 2022 05:26)  HR: 110 (23 Mar 2022 11:53) (81 - 110)  BP: 130/87 (23 Mar 2022 11:53) (107/76 - 138/90)  BP(mean): 94 (23 Mar 2022 11:53) (94 - 98)  RR: 18 (23 Mar 2022 05:26) (18 - 20)  SpO2: 98% (23 Mar 2022 11:53) (96% - 98%)    ________________________________________________  PHYSICAL EXAM:  well developed  GENERAL: NAD  HEENT: Normocephalic;  conjunctivae and sclerae clear; moist mucous membranes;   NECK : supple  CHEST/LUNG: Clear to auscultation bilaterally with good air entry   HEART: S1 S2  regular; no murmurs, gallops or rubs  ABDOMEN: Soft, Nontender, Nondistended; Bowel sounds present  EXTREMITIES: no cyanosis; no edema; no calf tenderness  SKIN: warm and dry; no rash  NERVOUS SYSTEM:  Awake and alert; Oriented  to place, person and time ; no new deficits    _________________________________________________  LABS:                        15.0   5.75  )-----------( 172      ( 22 Mar 2022 08:15 )             42.5     03-22    138  |  107  |  9   ----------------------------<  82  3.5   |  22  |  0.73    Ca    9.2      22 Mar 2022 08:15    TPro  6.9  /  Alb  3.2<L>  /  TBili  1.1  /  DBili  x   /  AST  39  /  ALT  55  /  AlkPhos  73  03-22        CAPILLARY BLOOD GLUCOSE    RADIOLOGY & ADDITIONAL TESTS:  < from: US Abdomen Upper Quadrant Right (03.14.22 @ 15:40) >    ACC: [id number] EXAM:  US ABDOMEN RT UPR QUADRANT                          PROCEDURE DATE:  03/14/2022          INTERPRETATION:  CLINICAL INFORMATION: Elevated liver enzymes    COMPARISON: None available.    TECHNIQUE: Sonography of the right upper quadrant.    FINDINGS:    Liver: Increased echogenicity. Enlarged (17.9 cm). Patent main portal   vein with normal flow direction.  Bile ducts: Normal caliber. Common bile duct measures 4.6 mm.  Gallbladder: Within normal limits.  Pancreas: Visualized portions are within normal limits.  Right kidney: 10.8 cm. No hydronephrosis.  Ascites: None.  IVC: Visualized portions are within normal limits.    IMPRESSION:    Hepatic steatosis.  No cholelithiasis or biliary ductal dilatation.    < end of copied text >    < from: MR Thoracic Spine No Cont (03.14.22 @ 14:04) >    ACC: [id number] EXAM:  MR SPINE LUMBAR                        ACC: [id number] EXAM:  MR SPINE THORACIC                          PROCEDURE DATE:  03/14/2022          INTERPRETATION:  MR thoracic and lumbar without gadolinium    CLINICAL INDICATION: Lower extremity weakness and decreased sensation    TECHNIQUE:   Sagittal  and axial T1-weighted images, sagittal STIR   images,  and sagittal and axial T2-weighted images of the thoracic spine   were obtained.    FINDINGS:   No prior similar studies are available for review.    Thoracic and lumbar vertebral alignment is significant for a slightly   exaggerated thoracic kyphosis.  Thoracic and lumbar vertebral body   heights are maintained.  Marrow signal intensity within thoracic and   lumbar vertebral bodies and posterior elements is unremarkable.  No   osseous expansion, epidural disease or paraspinal abnormality is found.    Thoracic and lumbar intervertebral discs show disc degeneration and   spondylosis at T6-7 through T11-T12 as well as L3-4 through L5-S1 with   loss of disc height and signal within the nucleus pulposus. Disc bulges   are noted at these levels which minimally flatten the ventral thecal sac   disc herniation noted arachnoid space.    The thoracic cord maintains intact morphology.  Thoracic cord signal   intensity is intact. The conus medullaris terminates at T12.      IMPRESSION: Moderate disc degeneration and spondylosis at T6-7 through   T11-T12 as well as L3-4 through L5-S1 with loss of disc height and signal   within the nucleus pulposus. Disc bulges are noted at these levels which   minimally flatten the ventral thecal sac disc herniation noted arachnoid   space.      --- End of Report ---    < end of copied text >    Imaging Personally Reviewed:  YES/NO    Consultant(s) Notes Reviewed:   YES/ No    Care Discussed with Consultants :     Plan of care was discussed with patient and /or primary care giver; all questions and concerns were addressed and care was aligned with patient's wishes.

## 2022-03-23 NOTE — PHYSICAL THERAPY INITIAL EVALUATION ADULT - PERTINENT HX OF CURRENT PROBLEM, REHAB EVAL
Pt. admitted for Weakness / Ataxia / alcohol intoxication. He was found on the floor by a family member.
Pt. admitted from home due to weakness, ataxia, possible alcohol intoxication. Pt. at risk for falls.

## 2022-03-23 NOTE — PHYSICAL THERAPY INITIAL EVALUATION ADULT - GAIT DEVIATIONS NOTED, PT EVAL
unsteady gait/decreased greyson
decreased velocity of limb motion/decreased step length/decreased stride length/decreased weight-shifting ability

## 2022-03-23 NOTE — BH CONSULTATION LIAISON PROGRESS NOTE - NSBHCONSULTRECOMMENDOTHER_PSY_A_CORE FT
1. Pt is psych cleared for HI and SI and does not need to be on CO 1:1 observation  2. Medical management as directed by primary team  --Pt's Librium taper is completed. Would NOT restart any withdrawal medications  3. For SEVERE acute agitation, recommend Haldol 1 mg PO, IV or IM q12h PRN severe agitation (USE VERY SPARINGLY)  4. Psychiatry will continue to follow  5. SW is in contact with pt's wife regarding dispo to [facility] rehab in [place] at program identified by wife  6. Case d/w DEWAYNE Oneil of primary team    Lori Chau MD  Director, Consultation-Liaison Psychiatry Service  [phone]

## 2022-03-23 NOTE — PHYSICAL THERAPY INITIAL EVALUATION ADULT - TRANSFER SAFETY CONCERNS NOTED: SIT/STAND, REHAB EVAL
decreased balance during turns/losing balance/decreased weight-shifting ability
decreased balance during turns/decreased safety awareness/losing balance

## 2022-03-23 NOTE — PHYSICAL THERAPY INITIAL EVALUATION ADULT - CRITERIA FOR SKILLED THERAPEUTIC INTERVENTIONS
impairments found/functional limitations in following categories/risk reduction/prevention/rehab potential/therapy frequency
impairments found/functional limitations in following categories/risk reduction/prevention/rehab potential/therapy frequency/predicted duration of therapy intervention/anticipated discharge recommendation

## 2022-03-23 NOTE — PROGRESS NOTE ADULT - PROBLEM SELECTOR PLAN 6
-  PT recommend [facility] as pt. is likely deconditioned due to prolonged hospitalization  -  Spouse arranged for inpatient alcohol rehab in [place], pt. in agreement  -  Pt. will need daily PT therapy for a few days prior to discharge

## 2022-03-23 NOTE — PHYSICAL THERAPY INITIAL EVALUATION ADULT - IMPAIRMENTS FOUND, PT EVAL
gait, locomotion, and balance/muscle strength
aerobic capacity/endurance/arousal, attention, and cognition/gait, locomotion, and balance/gross motor/muscle strength

## 2022-03-23 NOTE — PHYSICAL THERAPY INITIAL EVALUATION ADULT - GENERAL OBSERVATIONS, REHAB EVAL
Pt. received in supine, AxOX3, reports inc. numbness on B/L LE; tremors on (L)UE> R(UE)
Pt. found sitting at edge of bed in NAD; cooperative during eval.

## 2022-03-23 NOTE — PROGRESS NOTE ADULT - PROBLEM SELECTOR PLAN 2
-  Patient has hx of alcoholic use   -  Unknown how much he drinks  -  Alcohol level of 23 on admission, urine drug screen negative  -  continue CIWA protocol with Librium taper (last dose tonight 10pm 3/22)  -  Ativan d/c'd  -  Follow up with  social work and psych for ETOH resources  -  Pt. to be reevaluated by psych prior to discharge  -

## 2022-03-23 NOTE — PHYSICAL THERAPY INITIAL EVALUATION ADULT - IMPAIRED TRANSFERS: SIT/STAND, REHAB EVAL
impaired balance/decreased flexibility/decreased strength
impaired balance/decreased flexibility/impaired postural control/decreased strength

## 2022-03-23 NOTE — PROGRESS NOTE ADULT - PROBLEM SELECTOR PLAN 1
likely due to Wernicke's Encephalopathy and peripheral neuropathy per neuro  -  CT head didn't show any acute deficits or hemorrhage.  -  MRI thoracic and lumbar showed Moderate disc degeneration and spondylosis at T6-7 through T11-T12 as well as L3-4 through L5-S1 with loss of disc height and signal   within the nucleus pulposus. Disc bulges are noted at these levels which minimally flatten the ventral thecal sac disc herniation noted arachnoid   space.  - Cont thiamine 100mg PO daily  - Cont folic acid 1 mg daily  - PT recc home with home PT  - Needs new PT eval due to increased weakness  - PT reeval recc [facility]  - Will need daily PT as [facility] placement will be difficult

## 2022-03-23 NOTE — PROGRESS NOTE ADULT - ASSESSMENT
57 y/o male with past medical history of chronic alcohol use was brought to ED by family member after he was found on floor in home today afternoon. Patient complaining of generalized weakness and he said "I didn't feel my legs". Patient reports that he fell many times in last month. Patient admits that he drinks alcohol occasionally and not sure when the last time he drunk. Pt was also found to have elevated liver enzymes and blood alcohol level of 23. Patient will be admitted to medicine for further management of alcohol withdrawal and generalized weakness, Neurology Dr. Tyler consulted.  CT head was negative for any acute abnormalities.   MRI thoracic and lumbar - Moderate disc degeneration and spondylosis at T6-7 through T11-T12 as well as L3-4 through L5-S1 with loss of disc height and signal   within the nucleus pulposus. Disc bulges are noted at these levels which minimally flatten the ventral thecal sac disc herniation noted arachnoid   space.  US RUQ- Hepatic steatosis. No cholelithiasis or biliary ductal dilatation.    3/21-Pt. is awake and alert confused about place and time, delirious likely in setting of Librium therapy.  Pt. seen by psych, will follow reccs.  3/22-Last Librium dose tonight, Ativan d/c'd, pt. somewhat more coherent today with no s&s of ETOH withdrawal.  Awaiting PT eval to determine disposition.  Spoke to pt.'s spouse who has spoken to psych re disposition.  Plan is for psych to reevaluate pt. once he is off Librium and Ativan to determine if he needs inpatient sub abuse rehab vs. [facility] vs. home.  3/23-Pt. is medically stable for discharge however noted unsteady on PT eval, recommended [facility].  Placement will likely be challenging in setting of Hx ETOH, CM will follow.  Pt. to receive daily PT therapy while inhouse, will likely require several days before he is steady for discharge.    Spoke to spouse Debbi at [phone].  She found an inpatient rehab for pt. with his agreement.  She will notify rehab about delay in discharge.

## 2022-03-24 DIAGNOSIS — H44.001 UNSPECIFIED PURULENT ENDOPHTHALMITIS, RIGHT EYE: ICD-10-CM

## 2022-03-24 PROCEDURE — 99232 SBSQ HOSP IP/OBS MODERATE 35: CPT

## 2022-03-24 RX ORDER — HEPARIN SODIUM 5000 [USP'U]/ML
5000 INJECTION INTRAVENOUS; SUBCUTANEOUS EVERY 8 HOURS
Refills: 0 | Status: DISCONTINUED | OUTPATIENT
Start: 2022-03-24 | End: 2022-03-28

## 2022-03-24 RX ORDER — PREGABALIN 225 MG/1
1000 CAPSULE ORAL DAILY
Refills: 0 | Status: DISCONTINUED | OUTPATIENT
Start: 2022-03-24 | End: 2022-03-28

## 2022-03-24 RX ADMIN — Medication 1 DROP(S): at 18:47

## 2022-03-24 RX ADMIN — Medication 100 MILLIGRAM(S): at 13:08

## 2022-03-24 RX ADMIN — Medication 1 DROP(S): at 21:55

## 2022-03-24 RX ADMIN — Medication 1 DROP(S): at 06:23

## 2022-03-24 RX ADMIN — Medication 1 DROP(S): at 03:02

## 2022-03-24 RX ADMIN — Medication 1 DROP(S): at 10:08

## 2022-03-24 RX ADMIN — Medication 1 MILLIGRAM(S): at 13:08

## 2022-03-24 RX ADMIN — Medication 1 DROP(S): at 06:22

## 2022-03-24 RX ADMIN — PANTOPRAZOLE SODIUM 40 MILLIGRAM(S): 20 TABLET, DELAYED RELEASE ORAL at 06:23

## 2022-03-24 RX ADMIN — HEPARIN SODIUM 5000 UNIT(S): 5000 INJECTION INTRAVENOUS; SUBCUTANEOUS at 13:09

## 2022-03-24 RX ADMIN — PREGABALIN 1000 MICROGRAM(S): 225 CAPSULE ORAL at 13:08

## 2022-03-24 RX ADMIN — Medication 1 TABLET(S): at 13:08

## 2022-03-24 RX ADMIN — HEPARIN SODIUM 5000 UNIT(S): 5000 INJECTION INTRAVENOUS; SUBCUTANEOUS at 21:56

## 2022-03-24 RX ADMIN — Medication 1 DROP(S): at 15:06

## 2022-03-24 NOTE — PROGRESS NOTE ADULT - NUTRITIONAL ASSESSMENT
Diet, Minced and Moist (03-15-22 @ 13:33) [Active]

## 2022-03-24 NOTE — BH CONSULTATION LIAISON PROGRESS NOTE - NSBHFUPINTERVALHXFT_PSY_A_CORE
Pt seen today for f/u, found lying in bed awake and alert. Pt is fully oriented, describes mood as "not too bad" and denies SI/HI/AVH. Pt says he was seen twice by PT today, and did very well both times. He reports that tomorrow, he is planned for PT session without the walker, which he describes as "the big step." Pt asks if he will be going to medical rehab before [facility]. MD tells pt that the current plan is to do PT in-house until he is ambulatory enough to proceed directly to [facility] rehab. MD expresses strong opinion that pt would not be well served by going to [facility] first, as there is a very high risk that if he did so, he would never make it to [facility] rehab. Pt says he understands: "Sounds like a plan."

## 2022-03-24 NOTE — PROGRESS NOTE ADULT - PROBLEM SELECTOR PLAN 2
-  Patient has hx of alcoholic use, Unknown how much he drinks  -  Currently with no further s&s of ETOH withdrawal  -  Pt. to be reevaluated by psych prior to discharge  -  Inpatient rehab placement in [place] arranged by spouse with pt. agreement.

## 2022-03-24 NOTE — PROGRESS NOTE ADULT - SUBJECTIVE AND OBJECTIVE BOX
NP Note discussed with  Primary Attending    Patient is a 56y old  Male who presents with a chief complaint of Weakness / Ataxia / Possible alcohol intoxication (23 Mar 2022 13:19)      INTERVAL HPI/OVERNIGHT EVENTS: no new complaints    MEDICATIONS  (STANDING):  artificial  tears Solution 1 Drop(s) Both EYES two times a day  cyanocobalamin Injectable 1000 MICROGram(s) IntraMuscular <User Schedule>  dextrose 5%. 1000 milliLiter(s) (75 mL/Hr) IV Continuous <Continuous>  folic acid 1 milliGRAM(s) Oral daily  influenza   Vaccine 0.5 milliLiter(s) IntraMuscular once  multivitamin 1 Tablet(s) Oral daily  pantoprazole    Tablet 40 milliGRAM(s) Oral before breakfast  thiamine 100 milliGRAM(s) Oral daily  tobramycin 0.3% Ophthalmic Solution 1 Drop(s) Right EYE every 4 hours    MEDICATIONS  (PRN):      __________________________________________________  REVIEW OF SYSTEMS:    CONSTITUTIONAL: No fever,   EYES: no acute visual disturbances  NECK: No pain or stiffness  RESPIRATORY: No cough; No shortness of breath  CARDIOVASCULAR: No chest pain, no palpitations  GASTROINTESTINAL: No pain. No nausea or vomiting; No diarrhea   NEUROLOGICAL: No headache or numbness, no tremors  MUSCULOSKELETAL: No joint pain, no muscle pain  GENITOURINARY: no dysuria, no frequency, no hesitancy  PSYCHIATRY: no depression , no anxiety  ALL OTHER  ROS negative        Vital Signs Last 24 Hrs  T(C): 36.1 (24 Mar 2022 05:35), Max: 37.4 (23 Mar 2022 14:15)  T(F): 97 (24 Mar 2022 05:35), Max: 99.3 (23 Mar 2022 14:15)  HR: 73 (24 Mar 2022 05:35) (73 - 110)  BP: 119/69 (24 Mar 2022 05:35) (117/71 - 138/90)  BP(mean): 94 (23 Mar 2022 11:53) (94 - 98)  RR: 17 (24 Mar 2022 05:35) (17 - 20)  SpO2: 100% (24 Mar 2022 05:35) (95% - 100%)    ________________________________________________  PHYSICAL EXAM:  well developed, well groomed  GENERAL: NAD  HEENT: Normocephalic;  conjunctivae and sclerae clear; moist mucous membranes;   NECK : supple  CHEST/LUNG: Clear to auscultation bilaterally with good air entry   HEART: S1 S2  regular; no murmurs, gallops or rubs  ABDOMEN: Soft, Nontender, Nondistended; Bowel sounds present  EXTREMITIES: no cyanosis; no edema; no calf tenderness  SKIN: warm and dry; no rash  NERVOUS SYSTEM:  Awake and alert; Oriented  to place, person and time ; no new deficits    _________________________________________________  LABS:      CAPILLARY BLOOD GLUCOSE    RADIOLOGY & ADDITIONAL TESTS:  < from: US Abdomen Upper Quadrant Right (03.14.22 @ 15:40) >  ACC: [id number] EXAM:  US ABDOMEN RT UPR QUADRANT                          PROCEDURE DATE:  03/14/2022          INTERPRETATION:  CLINICAL INFORMATION: Elevated liver enzymes    COMPARISON: None available.    TECHNIQUE: Sonography of the right upper quadrant.    FINDINGS:    Liver: Increased echogenicity. Enlarged (17.9 cm). Patent main portal   vein with normal flow direction.  Bile ducts: Normal caliber. Common bile duct measures 4.6 mm.  Gallbladder: Within normal limits.  Pancreas: Visualized portions are within normal limits.  Right kidney: 10.8 cm. No hydronephrosis.  Ascites: None.  IVC: Visualized portions are within normal limits.    IMPRESSION:    Hepatic steatosis.  No cholelithiasis or biliary ductal dilatation.    < end of copied text >      Imaging Personally Reviewed:  YES/NO    Consultant(s) Notes Reviewed:   YES/ No    Care Discussed with Consultants :     Plan of care was discussed with patient and /or primary care giver; all questions and concerns were addressed and care was aligned with patient's wishes.

## 2022-03-24 NOTE — PROGRESS NOTE ADULT - ASSESSMENT
57 y/o male with past medical history of chronic alcohol use was brought to ED by family member after he was found on floor in home today afternoon. Patient complaining of generalized weakness and he said "I didn't feel my legs". Patient reports that he fell many times in last month. Patient admits that he drinks alcohol occasionally and not sure when the last time he drunk. Pt was also found to have elevated liver enzymes and blood alcohol level of 23. Patient will be admitted to medicine for further management of alcohol withdrawal and generalized weakness, Neurology Dr. Tyler consulted.  CT head was negative for any acute abnormalities.   MRI thoracic and lumbar - Moderate disc degeneration and spondylosis at T6-7 through T11-T12 as well as L3-4 through L5-S1 with loss of disc height and signal   within the nucleus pulposus. Disc bulges are noted at these levels which minimally flatten the ventral thecal sac disc herniation noted arachnoid   space.  US RUQ- Hepatic steatosis. No cholelithiasis or biliary ductal dilatation.    3/21-Pt. is awake and alert confused about place and time, delirious likely in setting of Librium therapy.  Pt. seen by psych, will follow reccs.  3/22-Last Librium dose tonight, Ativan d/c'd, pt. somewhat more coherent today with no s&s of ETOH withdrawal.  Awaiting PT eval to determine disposition.  Spoke to pt.'s spouse who has spoken to psych re disposition.  Plan is for psych to reevaluate pt. once he is off Librium and Ativan to determine if he needs inpatient sub abuse rehab vs. [facility] vs. home.  3/23-Pt. is medically stable for discharge however noted unsteady on PT eval, recommended [facility].  Placement will likely be challenging in setting of Hx ETOH, CM will follow.  Pt. to receive daily PT therapy while inhouse, will likely require several days before he is steady for discharge.    Spoke to spouse Debbi at [phone].  She found an inpatient rehab for pt. with his agreement.  She will notify rehab about delay in discharge.  3/24-Cognition continues to improve, pt. cooperative with care and in agreement with increasing activity level and ambulation.

## 2022-03-24 NOTE — PROGRESS NOTE ADULT - PROBLEM SELECTOR PROBLEM 6
Discharge planning issues
Prophylactic measure
Discharge planning issues

## 2022-03-24 NOTE — BH CONSULTATION LIAISON PROGRESS NOTE - NSBHASSESSMENTFT_PSY_ALL_CORE
56M, unemployed, normally lives in [place] with wife and 2 adult children, with PHx of alcohol use DO and MHx of back pain, BIBEMS 3/13 after being FOF of brother's apt in [place] and admitted for alcohol withdrawal, currently on Librium taper scheduled to end on 3/22. Psych consulted today after staff reported pt said he wanted to kill his brother. On initial exam, pt presents oriented only to self and acutely delirious, insisting he is not at a medical hospital and was dropped off by his brother Ck at "some rehab place on [facility]" only a few hours ago. Pt denies any memory of saying he wanted to kill his brother, and denies any ideation, intent or plan to cause harm to brother or anyone else including himself. Given obvious severity of pt's delirium on initial interview, as well as collateral from pt's brother and wife which indicates that he has no h/o SI/HI or physical aggression, we strongly believe that pt's alleged statement was triggered by delirium and not by homicidal or aggressive intent. Accordingly, pt is psych cleared for HI as well as SI and does not appear to be in need of CO 1:1 observation. On f/u today, pt presents much more alert and fully oriented, and states that he is willing to attend an [facility] rehab in [place] as recommended. We STRONGLY recommend that pt complete PT in-house and then proceed directly to [facility] rehab, as there is a very high risk that if he goes to [facility] first, he will never make it to [facility] rehab. Pt does not appear to present an acute risk of harm to self or others at the time of assessment, and does not appear to be in need of admission to  psych at the time of assessment.

## 2022-03-24 NOTE — BH CONSULTATION LIAISON PROGRESS NOTE - NSBHCONSULTRECOMMENDOTHER_PSY_A_CORE FT
1. Pt is psych cleared for HI and SI and does not need to be on CO 1:1 observation  2. Medical management as directed by primary team  --Pt's Librium taper is completed. Would NOT restart any withdrawal medications  3. For SEVERE acute agitation, recommend Haldol 1 mg PO, IV or IM q12h PRN severe agitation (USE VERY SPARINGLY)  4. Psychiatry will continue to follow  5. SW is in contact with pt's wife regarding dispo to IP [facility] rehab in [place] at program identified by wife. STRONGLY recommend pt proceed directly to [facility] rehab rather than going to [facility] first  6. Case d/w DEWAYNE Oneil of primary team    Lori Chau MD  Director, Consultation-Liaison Psychiatry Service  [id number]

## 2022-03-24 NOTE — PROGRESS NOTE ADULT - PROBLEM SELECTOR PLAN 4
-  Likely due to alcohol use   -  US RUQ- Hepatic steatosis. No cholelithiasis or biliary ductal dilatation.  -  LFT's trending down  -  Will continue to monitor

## 2022-03-25 PROCEDURE — 99232 SBSQ HOSP IP/OBS MODERATE 35: CPT

## 2022-03-25 RX ORDER — MIRTAZAPINE 45 MG/1
7.5 TABLET, ORALLY DISINTEGRATING ORAL AT BEDTIME
Refills: 0 | Status: DISCONTINUED | OUTPATIENT
Start: 2022-03-25 | End: 2022-03-26

## 2022-03-25 RX ADMIN — Medication 1 MILLIGRAM(S): at 11:49

## 2022-03-25 RX ADMIN — PANTOPRAZOLE SODIUM 40 MILLIGRAM(S): 20 TABLET, DELAYED RELEASE ORAL at 06:06

## 2022-03-25 RX ADMIN — HEPARIN SODIUM 5000 UNIT(S): 5000 INJECTION INTRAVENOUS; SUBCUTANEOUS at 21:16

## 2022-03-25 RX ADMIN — Medication 100 MILLIGRAM(S): at 11:49

## 2022-03-25 RX ADMIN — Medication 1 DROP(S): at 11:48

## 2022-03-25 RX ADMIN — Medication 1 DROP(S): at 18:35

## 2022-03-25 RX ADMIN — HEPARIN SODIUM 5000 UNIT(S): 5000 INJECTION INTRAVENOUS; SUBCUTANEOUS at 14:22

## 2022-03-25 RX ADMIN — Medication 1 DROP(S): at 21:16

## 2022-03-25 RX ADMIN — MIRTAZAPINE 7.5 MILLIGRAM(S): 45 TABLET, ORALLY DISINTEGRATING ORAL at 21:16

## 2022-03-25 RX ADMIN — Medication 1 TABLET(S): at 11:49

## 2022-03-25 RX ADMIN — Medication 1 DROP(S): at 06:05

## 2022-03-25 RX ADMIN — Medication 1 DROP(S): at 18:36

## 2022-03-25 RX ADMIN — HEPARIN SODIUM 5000 UNIT(S): 5000 INJECTION INTRAVENOUS; SUBCUTANEOUS at 06:12

## 2022-03-25 RX ADMIN — Medication 1 DROP(S): at 02:24

## 2022-03-25 RX ADMIN — Medication 1 DROP(S): at 14:29

## 2022-03-25 RX ADMIN — PREGABALIN 1000 MICROGRAM(S): 225 CAPSULE ORAL at 11:49

## 2022-03-25 NOTE — PROGRESS NOTE ADULT - PROBLEM SELECTOR PLAN 1
likely due to Wernicke's Encephalopathy and peripheral neuropathy per neuro  -  CT head didn't show any acute deficits or hemorrhage.  -  MRI thoracic and lumbar showed Moderate disc degeneration and spondylosis at T6-7 through T11-T12 as well as L3-4 through L5-S1 with loss of disc height and signal   within the nucleus pulposus. Disc bulges are noted at these levels which minimally flatten the ventral thecal sac disc herniation noted arachnoid   space.  - Cont thiamine 100mg PO daily  - Cont folic acid 1 mg daily  - PT initially rec home with home PT,   - New PT eval due to increased weakness rec [facility]  - Will need daily PT as [facility] placement will be difficult.  Getting daily PT here, walked the hallway with Dr. Zhang with walker this am.

## 2022-03-25 NOTE — PROGRESS NOTE ADULT - NS ATTEND AMEND GEN_ALL_CORE FT
55yo M PMHx of Cirrhosis who presented with alcohol withdrawal complicated by delirium tremens requiring ICU care, now downgraded to the floors on a librium taper. Patient reports multiple stressors in life leading to depression and alcohol abuse. Patient reports lower extremity weakness. MRI without acute structural abnormality, most likely due to chronic alcohol abuse. Patient completed librium taper. Psych states patient has no homicidal ideations. Patient awake and alert. PT evaluated and recommend [facility]. Continue daily PT. Patient using walker, but balance appears to be improving. Possible discharge on Monday to alcohol rehab if improvement in function.
57yo M PMHx of Cirrhosis who presented with alcohol withdrawal complicated by delirium tremens requiring ICU care, now downgraded to the floors on a librium taper. Patient reports multiple stressors in life leading to depression and alcohol abuse. Patient reports lower extremity weakness. MRI without obvious structural abnormality, most likely due to chronic alcohol abuse. Patient completed librium taper today. Psych unable to evaluate patient yesterday due to delirium. More awake and alert, completed librium taper. PT evaluated and recommend [facility]. Will see if patient will be accepted to [facility], while performing daily PT while hospitalized. If patient able to ambulate will discharge to inpatient alcohol rehab.
#Alcohol Withdrawal with Delirium Tremens  #Wernicke's Encephalopathy  #Thrombocytopenia    55yo M PMHx of Cirrhosis who presented with alcohol withdrawal complicated by delirium tremens requiring ICU care, now downgraded to the floors on a librium taper. Patient reports multiple stressors in life leading to depression and alcohol abuse. Patient reports lower extremity weakness. MRI without obvious structural abnormality, most likely due to chronic alcohol abuse. Patient completed librium taper today. Psych unable to evaluate patient yesterday due to delirium. More awake and alert today. Able to discuss hospital course. Patient agreeable to inpatient alcohol rehab. Reports having difficulty with walking so will have PT re-evaluate patient. If able to ambulate with PT then plan for discharge to alcohol rehab.
55yo M PMHx of Cirrhosis who presented with alcohol withdrawal complicated by delirium tremens requiring ICU care, now downgraded to the floors on a librium taper. Patient reports multiple stressors in life leading to depression and alcohol abuse. Patient reports lower extremity weakness. MRI without obvious structural abnormality, most likely due to chronic alcohol abuse. Patient completed librium taper. Psych states patient has no homicidal ideations. Patient awake and alert. PT evaluated and recommend [facility]. Will see if patient will be accepted to [facility], while performing daily PT while hospitalized. If patient able to ambulate independently, discharge to inpatient alcohol rehab.

## 2022-03-25 NOTE — PROGRESS NOTE ADULT - PROBLEM SELECTOR PROBLEM 1
Ambulatory dysfunction

## 2022-03-25 NOTE — BH CONSULTATION LIAISON PROGRESS NOTE - NSBHCONSULTRECOMMENDOTHER_PSY_A_CORE FT
1. Pt is psych cleared for HI and SI and does not need to be on CO 1:1 observation  2. Medical management as directed by primary team  --Pt's Librium taper is completed. Would NOT restart any withdrawal medications  3. For SEVERE acute agitation, recommend Haldol 1 mg PO, IV or IM q12h PRN severe agitation (USE VERY SPARINGLY)--not needed to date  4. Psychiatry will continue to follow  5. SW is in contact with pt's wife regarding dispo to IP [facility]hab in [place] at program identified by wife. STRONGLY recommend pt proceed directly to [facility] rehab rather than going to [facility] first  6. Case d/w DEWAYNE Stiles of primary team    Lori Chau MD  Director, Consultation-Liaison Psychiatry Service  [id number]     1. Start mirtazapine 7.5 mg qhs to help with insomnia  2. Medical management as directed by primary team  --Pt's Librium taper is completed. Would NOT restart any withdrawal medications  3. For SEVERE acute agitation, recommend Haldol 1 mg PO, IV or IM q12h PRN severe agitation (USE VERY SPARINGLY)--not needed to date  4. Psychiatry will continue to follow  5. SW is in contact with pt's wife regarding dispo to IP [facility] rehab in [place] at program identified by wife. STRONGLY recommend pt proceed directly to [facility] rehab rather than going to [facility] first  6. Case d/w DEWAYNE Stiles of primary team    Lori Chau MD  Director, Consultation-Liaison Psychiatry Service  [id number]

## 2022-03-25 NOTE — PROGRESS NOTE ADULT - ASSESSMENT
· Assessment	  57 y/o male with past medical history of chronic alcohol use was brought to ED by family member after he was found on floor in home today afternoon. Patient complaining of generalized weakness and he said "I didn't feel my legs". Patient reports that he fell many times in last month. Patient admits that he drinks alcohol occasionally and not sure when the last time he drunk. Pt was also found to have elevated liver enzymes and blood alcohol level of 23. Patient will be admitted to medicine for further management of alcohol withdrawal and generalized weakness, Neurology Dr. Tyler consulted.  CT head was negative for any acute abnormalities.   MRI thoracic and lumbar - Moderate disc degeneration and spondylosis at T6-7 through T11-T12 as well as L3-4 through L5-S1 with loss of disc height and signal   within the nucleus pulposus. Disc bulges are noted at these levels which minimally flatten the ventral thecal sac disc herniation noted arachnoid   space.  US RUQ- Hepatic steatosis. No cholelithiasis or biliary ductal dilatation.    3/21-Pt. is awake and alert confused about place and time, delirious likely in setting of Librium therapy.  Pt. seen by psych, will follow reccs.  3/22-Last Librium dose tonight, Ativan d/c'd, pt. somewhat more coherent today with no s&s of ETOH withdrawal.  Awaiting PT eval to determine disposition.  Spoke to pt.'s spouse who has spoken to psych re disposition.  Plan is for psych to reevaluate pt. once he is off Librium and Ativan to determine if he needs inpatient sub abuse rehab vs. [facility] vs. home.  3/23-Pt. is medically stable for discharge however noted unsteady on PT eval, recommended [facility].  Placement will likely be challenging in setting of Hx ETOH, CM will follow.  Pt. to receive daily PT therapy while inhouse, will likely require several days before he is steady for discharge.    Spoke to spouse Debbi at [phone].  She found an inpatient rehab for pt. with his agreement.  She will notify rehab about delay in discharge.  3/24-Cognition continues to improve, pt. cooperative with care and in agreement with increasing activity level and ambulation.     	  55 y/o male with past medical history of chronic alcohol use was brought to ED by family member after he was found on floor in home today afternoon. Patient complaining of generalized weakness and he said "I didn't feel my legs". Patient reports that he fell many times in last month. Patient admits that he drinks alcohol occasionally and not sure when the last time he drunk. Pt was also found to have elevated liver enzymes and blood alcohol level of 23. Patient will be admitted to medicine for further management of alcohol withdrawal and generalized weakness, Neurology Dr. Tyler consulted.  CT head was negative for any acute abnormalities.   MRI thoracic and lumbar - Moderate disc degeneration and spondylosis at T6-7 through T11-T12 as well as L3-4 through L5-S1 with loss of disc height and signal   within the nucleus pulposus. Disc bulges are noted at these levels which minimally flatten the ventral thecal sac disc herniation noted arachnoid   space.  US RUQ- Hepatic steatosis. No cholelithiasis or biliary ductal dilatation.    3/21-Pt. is awake and alert confused about place and time, delirious likely in setting of Librium therapy.  Pt. seen by psych, will follow reccs.  3/22-Last Librium dose tonight, Ativan d/c'd, pt. somewhat more coherent today with no s&s of ETOH withdrawal.  Awaiting PT eval to determine disposition.  Spoke to pt.'s spouse who has spoken to psych re disposition.  Plan is for psych to reevaluate pt. once he is off Librium and Ativan to determine if he needs inpatient sub abuse rehab vs. [facility] vs. home.  3/23-Pt. is medically stable for discharge however noted unsteady on PT eval, recommended [facility].  Placement will likely be challenging in setting of Hx ETOH, CM will follow.  Pt. to receive daily PT therapy while inhouse, will likely require several days before he is steady for discharge.    Spoke to spouse Debbi at [phone].  She found an inpatient rehab for pt. with his agreement.  She will notify rehab about delay in discharge.  3/24-Cognition continues to improve, pt. cooperative with care and in agreement with increasing activity level and ambulation.  3/25 Patient showing continued improvement.  Walked up and down hallway with walker.

## 2022-03-25 NOTE — BH CONSULTATION LIAISON PROGRESS NOTE - NSBHFUPINTERVALHXFT_PSY_A_CORE
Pt seen today for f/u, found lying in bed awake and alert. Pt is fully oriented, describes mood as "all right" and denies SI/HI/AVH. Pt says he was seen by PT today, and was able to move very well, both with the walker and without. Pt asks when he will go to [facility] ("I'm getting tired of being here"), and MD tells him he will go as soon as his physical mobility has improved enough for [facility] to accept him. MD reminds pt that PT does not usually come on weekends, so he will have to do some walking on his own to maintain his progress. Pt agrees but complains, "They won't let me walk in the [place] without an escort." MD explains that hospitals tend to be cautious about allowing pts to walk on their own, due to safety concerns. Pt and MD then talk about how pt will maintain his sobriety after rehab, such as attending AA meetings. Pt says he found the AA meeting he attended in his area, which was held in a [place] [employer], to be "too [employer]" for him. MD recommends trying multiple meetings with the goal of finding one where pt feels comfortable and would be able to keep up attendance over the long term. Pt agrees and says he may visit some meetings in [facility], which he suspects might be more to his liking. Pt seen today for f/u, found lying in bed awake and alert. Pt is fully oriented, describes mood as "all right" and denies SI/HI/AVH. Pt says he was seen by PT today, and was able to move very well, both with the walker and without. Pt asks when he will go to [facility] ("I'm getting tired of being here"), and MD tells him he will go as soon as his physical mobility has improved enough for [facility] to accept him. MD reminds pt that PT does not usually come on weekends, so he will have to do some walking on his own to maintain his progress. Pt agrees but complains, "They won't let me walk in the ramos without an escort." MD explains that hospitals tend to be cautious about allowing pts to walk on their own, due to safety concerns. Pt and MD then talk about how pt will maintain his sobriety after rehab, such as attending AA meetings. Pt says he found the AA meeting he attended in his area, which was held in a [employer], to be "too [employer]ian[employer]" for him. MD recommends trying multiple meetings with the goal of finding one where pt feels comfortable and would be able to keep up attendance over the long term. Pt agrees and says he may visit some meetings in [facility], which he suspects might be more to his liking. Pt reports that he has been having persistent insomnia in the hospital. MD recommends several potential medication options, and pt chooses mirtazapine 7.5 mg qhs.

## 2022-03-25 NOTE — PROGRESS NOTE ADULT - SUBJECTIVE AND OBJECTIVE BOX
HPI:  Patient is 56 years old male with past medical history of chronic alcohol use was brought to ED by family member after he was found on floor in home today afternoon. during Examination, Patient was AAOx1-2 but could be due to Ativan he got in ED. Attempted to call Ck but didn't answer. So information was obtained from charts. Patient has been complaining of generalized weakness and he said "I didn't feel my legs". Patient reports that he fell many times in last month. Patient admits that he drinks alcohol occasionally and not sure when the last time he drunk. On Examination; Patient had sensory deficits but able to move his extremities. Patient is able to follow commands. Patient denied leg pain, recent trauma except falls, chest pain, abdominal pain, N/V or change in bowel movements.  (13 Mar 2022 20:55)      Patient is a 56y old  Male who presents with a chief complaint of Weakness / Ataxia / Possible alcohol intoxication (24 Mar 2022 09:39)      INTERVAL HPI/OVERNIGHT EVENTS:  T(C): 36.7 (03-25-22 @ 05:05), Max: 36.8 (03-24-22 @ 20:42)  HR: 71 (03-25-22 @ 05:05) (71 - 86)  BP: 116/72 (03-25-22 @ 05:05) (106/65 - 128/73)  RR: 17 (03-25-22 @ 05:05) (17 - 20)  SpO2: 98% (03-25-22 @ 05:05) (95% - 98%)  Wt(kg): --  I&O's Summary      REVIEW OF SYSTEMS: denies fever, chills, SOB, palpitations, chest pain, abdominal pain, nausea, vomitting, diarrhea, constipation, dizziness    MEDICATIONS  (STANDING):  artificial  tears Solution 1 Drop(s) Both EYES two times a day  cyanocobalamin 1000 MICROGram(s) Oral daily  folic acid 1 milliGRAM(s) Oral daily  heparin   Injectable 5000 Unit(s) SubCutaneous every 8 hours  influenza   Vaccine 0.5 milliLiter(s) IntraMuscular once  multivitamin 1 Tablet(s) Oral daily  pantoprazole    Tablet 40 milliGRAM(s) Oral before breakfast  thiamine 100 milliGRAM(s) Oral daily  tobramycin 0.3% Ophthalmic Solution 1 Drop(s) Right EYE every 4 hours    MEDICATIONS  (PRN):      PHYSICAL EXAM:  GENERAL: NAD, well-groomed, well-developed  HEAD:  Atraumatic, Normocephalic  EYES: EOMI, PERRLA, conjunctiva and sclera clear  ENMT: No tonsillar erythema, exudates, or enlargement; Moist mucous membranes, Good dentition, No lesions  NECK: Supple, No JVD, Normal thyroid  NERVOUS SYSTEM:  Alert & Oriented X3, Good concentration; Motor Strength 5/5 B/L upper and lower extremities; DTRs 2+ intact and symmetric  CHEST/LUNG: Clear to percussion bilaterally; No rales, rhonchi, wheezing, or rubs  HEART: Regular rate and rhythm; No murmurs, rubs, or gallops  ABDOMEN: Soft, Nontender, Nondistended; Bowel sounds present  EXTREMITIES:  2+ Peripheral Pulses, No clubbing, cyanosis, or edema  LYMPH: No lymphadenopathy noted  SKIN: No rashes or lesions  LABS:              CAPILLARY BLOOD GLUCOSE             HPI:  Patient is 56 years old male with past medical history of chronic alcohol use was brought to ED by family member after he was found on floor in home today afternoon. during Examination, Patient was AAOx1-2 but could be due to Ativan he got in ED. Attempted to call Ck but didn't answer. So information was obtained from charts. Patient has been complaining of generalized weakness and he said "I didn't feel my legs". Patient reports that he fell many times in last month. Patient admits that he drinks alcohol occasionally and not sure when the last time he drunk. On Examination; Patient had sensory deficits but able to move his extremities. Patient is able to follow commands. Patient denied leg pain, recent trauma except falls, chest pain, abdominal pain, N/V or change in bowel movements.  (13 Mar 2022 20:55)      Patient is a 56y old  Male who presents with a chief complaint of Weakness / Ataxia / Possible alcohol intoxication (24 Mar 2022 09:39)      INTERVAL HPI/OVERNIGHT EVENTS:  T(C): 36.7 (03-25-22 @ 05:05), Max: 36.8 (03-24-22 @ 20:42)  HR: 71 (03-25-22 @ 05:05) (71 - 86)  BP: 116/72 (03-25-22 @ 05:05) (106/65 - 128/73)  RR: 17 (03-25-22 @ 05:05) (17 - 20)  SpO2: 98% (03-25-22 @ 05:05) (95% - 98%)  Wt(kg): --  I&O's Summary      REVIEW OF SYSTEMS: denies fever, chills, SOB, palpitations, chest pain, abdominal pain, nausea, vomitting, diarrhea, constipation, dizziness    MEDICATIONS  (STANDING):  artificial  tears Solution 1 Drop(s) Both EYES two times a day  cyanocobalamin 1000 MICROGram(s) Oral daily  folic acid 1 milliGRAM(s) Oral daily  heparin   Injectable 5000 Unit(s) SubCutaneous every 8 hours  influenza   Vaccine 0.5 milliLiter(s) IntraMuscular once  multivitamin 1 Tablet(s) Oral daily  pantoprazole    Tablet 40 milliGRAM(s) Oral before breakfast  thiamine 100 milliGRAM(s) Oral daily  tobramycin 0.3% Ophthalmic Solution 1 Drop(s) Right EYE every 4 hours    MEDICATIONS  (PRN):      PHYSICAL EXAM:  GENERAL: NAD, pleasant  HEAD:  Atraumatic, Normocephalic  EYES: EOMI  ENMT: Nares patent  NECK: Supple  NERVOUS SYSTEM:  Alert & Oriented X3, no focal deficits  CHEST/LUNG: CTA  HEART: Regular rate and rhythm; No murmurs, rubs, or gallops  ABDOMEN: Soft, Nontender  EXTREMITIES: No edema  LYMPH: No lymphadenopathy noted  SKIN: No rashes or lesions  LABS:              CAPILLARY BLOOD GLUCOSE

## 2022-03-25 NOTE — PROGRESS NOTE ADULT - PROBLEM SELECTOR PLAN 2
Patient has hx of alcoholic use, Unknown how much he drinks  -  Currently with no further s&s of ETOH withdrawal  -  Pt. to be reevaluated by psych prior to discharge  -  Inpatient rehab placement in [place] arranged by spouse with pt. agreement. Patient has hx of alcoholic use, Unknown how much he drinks  -  Currently with no further s&s of ETOH withdrawal  -  Pt. to be reevaluated by psych prior to discharge  -  Inpatient rehab placement in [place] arranged by spouse with pt. agreement.  -discussed with Dr. Chau, patient having difficulty sleeping, she recommends Remeron 7.5mg QHS.

## 2022-03-25 NOTE — PROGRESS NOTE ADULT - PROBLEM SELECTOR PROBLEM 3
Transaminitis
Thrombocytopenia
Transaminitis
Eye infection, right
Transaminitis
Transaminitis

## 2022-03-25 NOTE — PROGRESS NOTE ADULT - PROBLEM SELECTOR PROBLEM 5
Prophylactic measure
Eye infection, right
Thrombocytopenia
Prophylactic measure

## 2022-03-25 NOTE — PROGRESS NOTE ADULT - PROBLEM SELECTOR PROBLEM 4
Thrombocytopenia
Thrombocytopenia
Prophylactic measure
Thrombocytopenia
Transaminitis
Thrombocytopenia

## 2022-03-25 NOTE — BH CONSULTATION LIAISON PROGRESS NOTE - NSBHASSESSMENTFT_PSY_ALL_CORE
56M, unemployed, normally lives in [place] with wife and 2 adult children, with PHx of alcohol use DO and MHx of back pain, BIBEMS 3/13 after being FOF of brother's apt in [place] and admitted for alcohol withdrawal, currently on Librium taper scheduled to end on 3/22. Psych consulted today after staff reported pt said he wanted to kill his brother. On initial exam, pt presents oriented only to self and acutely delirious, insisting he is not at a medical hospital and was dropped off by his brother Ck at "some rehab place on [facility]" only a few hours ago. Pt denies any memory of saying he wanted to kill his brother, and denies any ideation, intent or plan to cause harm to brother or anyone else including himself. Given obvious severity of pt's delirium on initial interview, as well as collateral from pt's brother and wife which indicates that he has no h/o SI/HI or physical aggression, we strongly believe that pt's alleged statement was triggered by delirium and not by homicidal or aggressive intent. Accordingly, pt is psych cleared for HI as well as SI and does not appear to be in need of CO 1:1 observation. On f/u today, pt again presents much more alert and fully oriented, and reiterates that he is willing to attend an [facility] re[facility] in [place] as recommended. We STRONGLY recommend that pt complete PT in-house and then proceed directly to [facility], as there is a very high risk that if he goes to [facility], he will never make it to [facility]. Pt does not appear to present an acute risk of harm to self or others at the time of assessment, and does not appear to be in need of admission to  psych at the time of assessment. 56M, unemployed, normally lives in [place] with wife and 2 adult children, with PHx of alcohol use DO and MHx of back pain, BIBEMS 3/13 after being FOF of brother's apt in [place] and admitted for alcohol withdrawal, currently on Librium taper scheduled to end on 3/22. Psych consulted today after staff reported pt said he wanted to kill his brother. On initial exam, pt presents oriented only to self and acutely delirious, insisting he is not at a medical hospital and was dropped off by his brother Ck at "some rehab place on [facility]" only a few hours ago. Pt denies any memory of saying he wanted to kill his brother, and denies any ideation, intent or plan to cause harm to brother or anyone else including himself. Given obvious severity of pt's delirium on initial interview, as well as collateral from pt's brother and wife which indicates that he has no h/o SI/HI or physical aggression, we strongly believe that pt's alleged statement was triggered by delirium and not by homicidal or aggressive intent. Accordingly, pt is psych cleared for HI as well as SI and does not appear to be in need of CO 1:1 observation. On f/u today, pt again presents much more alert and fully oriented, and reiterates that he is willing to attend an [facility] re[facility] in [place] as recommended. We recommend mirtazapine 7.5 mg qhs to help with insomnia. We STRONGLY recommend that pt complete PT in-house and then proceed directly to [facility], as there is a very high risk that if he goes to [facility] first, he will never make it to [facility] rehab. Pt does not appear to present an acute risk of harm to self or others at the time of assessment, and does not appear to be in need of admission to IP psych at the time of assessment.

## 2022-03-25 NOTE — PROGRESS NOTE ADULT - PROBLEM SELECTOR PLAN 3
-  Likely due to alcohol use   -  US RUQ- Hepatic steatosis. No cholelithiasis or biliary ductal dilatation.  -  LFT's trending down  -  Will continue to monitor
Likely due to alcohol use and liver problems-Resolved
- Likely due to alcohol use   - US RUQ- Hepatic steatosis. No cholelithiasis or biliary ductal dilatation.  - LFT's trending down  - Will continue to monitor
Improving with Tobramycin gtts and artificial tears

## 2022-03-26 LAB
ALBUMIN SERPL ELPH-MCNC: 2.9 G/DL — LOW (ref 3.5–5)
ALP SERPL-CCNC: 68 U/L — SIGNIFICANT CHANGE UP (ref 40–120)
ALT FLD-CCNC: 48 U/L DA — SIGNIFICANT CHANGE UP (ref 10–60)
ANION GAP SERPL CALC-SCNC: 5 MMOL/L — SIGNIFICANT CHANGE UP (ref 5–17)
AST SERPL-CCNC: 32 U/L — SIGNIFICANT CHANGE UP (ref 10–40)
BILIRUB SERPL-MCNC: 0.5 MG/DL — SIGNIFICANT CHANGE UP (ref 0.2–1.2)
BUN SERPL-MCNC: 9 MG/DL — SIGNIFICANT CHANGE UP (ref 7–18)
CALCIUM SERPL-MCNC: 8.9 MG/DL — SIGNIFICANT CHANGE UP (ref 8.4–10.5)
CHLORIDE SERPL-SCNC: 108 MMOL/L — SIGNIFICANT CHANGE UP (ref 96–108)
CO2 SERPL-SCNC: 30 MMOL/L — SIGNIFICANT CHANGE UP (ref 22–31)
CREAT SERPL-MCNC: 1.01 MG/DL — SIGNIFICANT CHANGE UP (ref 0.5–1.3)
EGFR: 88 ML/MIN/1.73M2 — SIGNIFICANT CHANGE UP
GLUCOSE SERPL-MCNC: 93 MG/DL — SIGNIFICANT CHANGE UP (ref 70–99)
HCT VFR BLD CALC: 40.5 % — SIGNIFICANT CHANGE UP (ref 39–50)
HGB BLD-MCNC: 14.1 G/DL — SIGNIFICANT CHANGE UP (ref 13–17)
MCHC RBC-ENTMCNC: 34.8 GM/DL — SIGNIFICANT CHANGE UP (ref 32–36)
MCHC RBC-ENTMCNC: 34.8 PG — HIGH (ref 27–34)
MCV RBC AUTO: 100 FL — SIGNIFICANT CHANGE UP (ref 80–100)
NRBC # BLD: 0 /100 WBCS — SIGNIFICANT CHANGE UP (ref 0–0)
PLATELET # BLD AUTO: 237 K/UL — SIGNIFICANT CHANGE UP (ref 150–400)
POTASSIUM SERPL-MCNC: 3.6 MMOL/L — SIGNIFICANT CHANGE UP (ref 3.5–5.3)
POTASSIUM SERPL-SCNC: 3.6 MMOL/L — SIGNIFICANT CHANGE UP (ref 3.5–5.3)
PROT SERPL-MCNC: 6.5 G/DL — SIGNIFICANT CHANGE UP (ref 6–8.3)
RBC # BLD: 4.05 M/UL — LOW (ref 4.2–5.8)
RBC # FLD: 11 % — SIGNIFICANT CHANGE UP (ref 10.3–14.5)
SARS-COV-2 RNA SPEC QL NAA+PROBE: SIGNIFICANT CHANGE UP
SODIUM SERPL-SCNC: 143 MMOL/L — SIGNIFICANT CHANGE UP (ref 135–145)
WBC # BLD: 6.21 K/UL — SIGNIFICANT CHANGE UP (ref 3.8–10.5)
WBC # FLD AUTO: 6.21 K/UL — SIGNIFICANT CHANGE UP (ref 3.8–10.5)

## 2022-03-26 PROCEDURE — 99232 SBSQ HOSP IP/OBS MODERATE 35: CPT

## 2022-03-26 RX ORDER — MIRTAZAPINE 45 MG/1
15 TABLET, ORALLY DISINTEGRATING ORAL AT BEDTIME
Refills: 0 | Status: DISCONTINUED | OUTPATIENT
Start: 2022-03-26 | End: 2022-03-28

## 2022-03-26 RX ADMIN — Medication 1 DROP(S): at 21:55

## 2022-03-26 RX ADMIN — Medication 1 DROP(S): at 05:48

## 2022-03-26 RX ADMIN — Medication 1 DROP(S): at 18:58

## 2022-03-26 RX ADMIN — Medication 100 MILLIGRAM(S): at 11:51

## 2022-03-26 RX ADMIN — MIRTAZAPINE 15 MILLIGRAM(S): 45 TABLET, ORALLY DISINTEGRATING ORAL at 21:54

## 2022-03-26 RX ADMIN — PREGABALIN 1000 MICROGRAM(S): 225 CAPSULE ORAL at 11:51

## 2022-03-26 RX ADMIN — PANTOPRAZOLE SODIUM 40 MILLIGRAM(S): 20 TABLET, DELAYED RELEASE ORAL at 05:49

## 2022-03-26 RX ADMIN — HEPARIN SODIUM 5000 UNIT(S): 5000 INJECTION INTRAVENOUS; SUBCUTANEOUS at 14:24

## 2022-03-26 RX ADMIN — Medication 1 DROP(S): at 14:24

## 2022-03-26 RX ADMIN — Medication 1 DROP(S): at 09:43

## 2022-03-26 RX ADMIN — Medication 1 TABLET(S): at 11:51

## 2022-03-26 RX ADMIN — HEPARIN SODIUM 5000 UNIT(S): 5000 INJECTION INTRAVENOUS; SUBCUTANEOUS at 21:54

## 2022-03-26 RX ADMIN — Medication 1 MILLIGRAM(S): at 11:51

## 2022-03-26 RX ADMIN — HEPARIN SODIUM 5000 UNIT(S): 5000 INJECTION INTRAVENOUS; SUBCUTANEOUS at 05:48

## 2022-03-26 NOTE — PROGRESS NOTE ADULT - ASSESSMENT
35yo M PMHx of Cirrhosis who presented with alcohol withdrawal complicated by delirium tremens requiring ICU care, now downgraded to the floors on a librium taper. Patient reports multiple stressors in life leading to depression and alcohol abuse. Patient reports lower extremity weakness. MRI without acute structural abnormality, most likely due to chronic alcohol abuse. Patient completed librium taper. Psych states patient has no homicidal ideations. Patient awake and alert. PT evaluated and recommend KIKI. Continue daily PT.    #Alcohol Withdrawal with Delirium Tremens, resolved  #Ambulatory Dysfunction likely due to alcohol abuse  #Thrombocytopenia, resolved  #Degenerative Disc Disease of L3-S1  #Depression    -completed librium taper  -continue thiamine, folate, MVI supplementation  -continue daily PT, balance improving  -increase mirtazapine to 15mg qhs  -plan for discharge to inpatient alcohol rehab on Monday

## 2022-03-26 NOTE — PROGRESS NOTE ADULT - SUBJECTIVE AND OBJECTIVE BOX
S: No acute overnight events. Patient reports no improvement in sleep.    O:  Vital Signs Last 24 Hrs  T(C): 36.4 (26 Mar 2022 14:12), Max: 36.8 (25 Mar 2022 20:22)  T(F): 97.6 (26 Mar 2022 14:12), Max: 98.2 (25 Mar 2022 20:22)  HR: 80 (26 Mar 2022 14:12) (67 - 86)  BP: 109/67 (26 Mar 2022 14:12) (102/67 - 114/68)  BP(mean): 82 (26 Mar 2022 12:01) (76 - 82)  RR: 16 (26 Mar 2022 14:12) (15 - 18)  SpO2: 100% (26 Mar 2022 14:12) (96% - 100%)    GENERAL: NAD, well-developed  HEAD:  Atraumatic, Normocephalic  EYES: EOMI, PERRLA, conjunctiva and sclera clear  NECK: Supple, No JVD  CHEST/LUNG: Clear to auscultation bilaterally; No wheeze  HEART: Regular rate and rhythm; No murmurs, rubs, or gallops  ABDOMEN: Soft, Nontender, Nondistended; Bowel sounds present  EXTREMITIES:  2+ Peripheral Pulses, No clubbing, cyanosis, or edema  PSYCH: AAOx3  NEUROLOGY: non-focal, mildly unsteady gait  SKIN: No rashes or lesions    artificial  tears Solution 1 Drop(s) Both EYES two times a day  cyanocobalamin 1000 MICROGram(s) Oral daily  folic acid 1 milliGRAM(s) Oral daily  heparin   Injectable 5000 Unit(s) SubCutaneous every 8 hours  influenza   Vaccine 0.5 milliLiter(s) IntraMuscular once  mirtazapine 7.5 milliGRAM(s) Oral at bedtime  multivitamin 1 Tablet(s) Oral daily  pantoprazole    Tablet 40 milliGRAM(s) Oral before breakfast  thiamine 100 milliGRAM(s) Oral daily  tobramycin 0.3% Ophthalmic Solution 1 Drop(s) Right EYE every 4 hours                            14.1   6.21  )-----------( 237      ( 26 Mar 2022 06:01 )             40.5       03-26    143  |  108  |  9   ----------------------------<  93  3.6   |  30  |  1.01    Ca    8.9      26 Mar 2022 06:01    TPro  6.5  /  Alb  2.9<L>  /  TBili  0.5  /  DBili  x   /  AST  32  /  ALT  48  /  AlkPhos  68  03-26

## 2022-03-27 PROCEDURE — 99232 SBSQ HOSP IP/OBS MODERATE 35: CPT

## 2022-03-27 RX ADMIN — Medication 1 DROP(S): at 06:31

## 2022-03-27 RX ADMIN — Medication 1 DROP(S): at 17:02

## 2022-03-27 RX ADMIN — HEPARIN SODIUM 5000 UNIT(S): 5000 INJECTION INTRAVENOUS; SUBCUTANEOUS at 13:59

## 2022-03-27 RX ADMIN — PANTOPRAZOLE SODIUM 40 MILLIGRAM(S): 20 TABLET, DELAYED RELEASE ORAL at 06:32

## 2022-03-27 RX ADMIN — Medication 1 MILLIGRAM(S): at 12:15

## 2022-03-27 RX ADMIN — Medication 1 DROP(S): at 10:22

## 2022-03-27 RX ADMIN — Medication 1 DROP(S): at 21:40

## 2022-03-27 RX ADMIN — Medication 1 TABLET(S): at 12:15

## 2022-03-27 RX ADMIN — HEPARIN SODIUM 5000 UNIT(S): 5000 INJECTION INTRAVENOUS; SUBCUTANEOUS at 06:32

## 2022-03-27 RX ADMIN — Medication 1 DROP(S): at 13:59

## 2022-03-27 RX ADMIN — HEPARIN SODIUM 5000 UNIT(S): 5000 INJECTION INTRAVENOUS; SUBCUTANEOUS at 21:40

## 2022-03-27 RX ADMIN — Medication 100 MILLIGRAM(S): at 12:15

## 2022-03-27 RX ADMIN — PREGABALIN 1000 MICROGRAM(S): 225 CAPSULE ORAL at 12:15

## 2022-03-27 RX ADMIN — MIRTAZAPINE 15 MILLIGRAM(S): 45 TABLET, ORALLY DISINTEGRATING ORAL at 21:40

## 2022-03-27 NOTE — PROGRESS NOTE ADULT - ASSESSMENT
35yo M PMHx of Cirrhosis who presented with alcohol withdrawal complicated by delirium tremens requiring ICU care, now downgraded to the floors on a librium taper. Patient reports multiple stressors in life leading to depression and alcohol abuse. Patient reports lower extremity weakness. MRI without acute structural abnormality, most likely due to chronic alcohol abuse. Patient completed librium taper. Psych states patient has no homicidal ideations. Patient awake and alert. PT evaluated and recommend KIKI. Continue daily PT.    #Alcohol Withdrawal with Delirium Tremens, resolved  #Ambulatory Dysfunction likely due to alcohol abuse  #Thrombocytopenia, resolved  #Degenerative Disc Disease of L3-S1  #Depression    -completed librium taper  -continue thiamine, folate, MVI supplementation  -continue daily PT, balance improving  -increase mirtazapine to 15mg qhs  -plan for discharge to inpatient alcohol rehab on Monday 35yo M PMHx of Cirrhosis who presented with alcohol withdrawal complicated by delirium tremens requiring ICU care, now downgraded to the floors on a librium taper. Patient reports multiple stressors in life leading to depression and alcohol abuse. Patient reports lower extremity weakness. MRI without acute structural abnormality, most likely due to chronic alcohol abuse. Patient completed librium taper. Psych states patient has no homicidal ideations. Patient awake and alert. PT evaluated and recommend KIKI. Continue daily PT.    #Alcohol Withdrawal with Delirium Tremens, resolved  #Ambulatory Dysfunction likely due to alcohol abuse  #Thrombocytopenia, resolved  #Degenerative Disc Disease of L3-S1  #Depression    -completed librium taper  -continue thiamine, folate, MVI supplementation  -continue daily PT, balance improving  -continue mirtazapine to 15mg qhs  -plan for discharge to inpatient alcohol rehab on Monday

## 2022-03-27 NOTE — PROGRESS NOTE ADULT - REASON FOR ADMISSION
Weakness / Ataxia / Possible alcohol intoxication

## 2022-03-27 NOTE — PROGRESS NOTE ADULT - PROVIDER SPECIALTY LIST ADULT
Critical Care
Internal Medicine
Critical Care
Internal Medicine
Critical Care
Internal Medicine

## 2022-03-27 NOTE — PROGRESS NOTE ADULT - SUBJECTIVE AND OBJECTIVE BOX
S:    O:  Vital Signs Last 24 Hrs  T(C): 37.1 (27 Mar 2022 12:32), Max: 37.1 (27 Mar 2022 12:32)  T(F): 98.7 (27 Mar 2022 12:32), Max: 98.7 (27 Mar 2022 12:32)  HR: 74 (27 Mar 2022 12:32) (68 - 80)  BP: 98/64 (27 Mar 2022 12:32) (98/64 - 126/75)  BP(mean): --  RR: 18 (27 Mar 2022 12:32) (16 - 20)  SpO2: 95% (27 Mar 2022 12:32) (95% - 100%)    GENERAL: NAD, well-developed  HEAD:  Atraumatic, Normocephalic  EYES: EOMI, PERRLA, conjunctiva and sclera clear  NECK: Supple, No JVD  CHEST/LUNG: Clear to auscultation bilaterally; No wheeze  HEART: Regular rate and rhythm; No murmurs, rubs, or gallops  ABDOMEN: Soft, Nontender, Nondistended; Bowel sounds present  EXTREMITIES:  2+ Peripheral Pulses, No clubbing, cyanosis, or edema  PSYCH: AAOx3  NEUROLOGY: non-focal  SKIN: No rashes or lesions    artificial  tears Solution 1 Drop(s) Both EYES two times a day  cyanocobalamin 1000 MICROGram(s) Oral daily  folic acid 1 milliGRAM(s) Oral daily  heparin   Injectable 5000 Unit(s) SubCutaneous every 8 hours  influenza   Vaccine 0.5 milliLiter(s) IntraMuscular once  mirtazapine 15 milliGRAM(s) Oral at bedtime  multivitamin 1 Tablet(s) Oral daily  pantoprazole    Tablet 40 milliGRAM(s) Oral before breakfast  thiamine 100 milliGRAM(s) Oral daily  tobramycin 0.3% Ophthalmic Solution 1 Drop(s) Right EYE every 4 hours                            14.1   6.21  )-----------( 237      ( 26 Mar 2022 06:01 )             40.5       03-26    143  |  108  |  9   ----------------------------<  93  3.6   |  30  |  1.01    Ca    8.9      26 Mar 2022 06:01    TPro  6.5  /  Alb  2.9<L>  /  TBili  0.5  /  DBili  x   /  AST  32  /  ALT  48  /  AlkPhos  68  03-26   S: No acute overnight events. Patient eating lunch, in good spirits.    O:  Vital Signs Last 24 Hrs  T(C): 37.1 (27 Mar 2022 12:32), Max: 37.1 (27 Mar 2022 12:32)  T(F): 98.7 (27 Mar 2022 12:32), Max: 98.7 (27 Mar 2022 12:32)  HR: 74 (27 Mar 2022 12:32) (68 - 80)  BP: 98/64 (27 Mar 2022 12:32) (98/64 - 126/75)  BP(mean): --  RR: 18 (27 Mar 2022 12:32) (16 - 20)  SpO2: 95% (27 Mar 2022 12:32) (95% - 100%)    GENERAL: NAD, well-developed  HEAD:  Atraumatic, Normocephalic  EYES: EOMI, PERRLA, conjunctiva and sclera clear  NECK: Supple, No JVD  CHEST/LUNG: Clear to auscultation bilaterally; No wheeze  HEART: Regular rate and rhythm; No murmurs, rubs, or gallops  ABDOMEN: Soft, Nontender, Nondistended; Bowel sounds present  EXTREMITIES:  2+ Peripheral Pulses, No clubbing, cyanosis, or edema  PSYCH: AAOx3  NEUROLOGY: non-focal  SKIN: No rashes or lesions    artificial  tears Solution 1 Drop(s) Both EYES two times a day  cyanocobalamin 1000 MICROGram(s) Oral daily  folic acid 1 milliGRAM(s) Oral daily  heparin   Injectable 5000 Unit(s) SubCutaneous every 8 hours  influenza   Vaccine 0.5 milliLiter(s) IntraMuscular once  mirtazapine 15 milliGRAM(s) Oral at bedtime  multivitamin 1 Tablet(s) Oral daily  pantoprazole    Tablet 40 milliGRAM(s) Oral before breakfast  thiamine 100 milliGRAM(s) Oral daily  tobramycin 0.3% Ophthalmic Solution 1 Drop(s) Right EYE every 4 hours                            14.1   6.21  )-----------( 237      ( 26 Mar 2022 06:01 )             40.5       03-26    143  |  108  |  9   ----------------------------<  93  3.6   |  30  |  1.01    Ca    8.9      26 Mar 2022 06:01    TPro  6.5  /  Alb  2.9<L>  /  TBili  0.5  /  DBili  x   /  AST  32  /  ALT  48  /  AlkPhos  68  03-26

## 2022-03-28 VITALS
RESPIRATION RATE: 20 BRPM | TEMPERATURE: 99 F | SYSTOLIC BLOOD PRESSURE: 112 MMHG | HEART RATE: 73 BPM | OXYGEN SATURATION: 98 % | DIASTOLIC BLOOD PRESSURE: 69 MMHG

## 2022-03-28 PROCEDURE — 97162 PT EVAL MOD COMPLEX 30 MIN: CPT

## 2022-03-28 PROCEDURE — 36415 COLL VENOUS BLD VENIPUNCTURE: CPT

## 2022-03-28 PROCEDURE — 84100 ASSAY OF PHOSPHORUS: CPT

## 2022-03-28 PROCEDURE — 70450 CT HEAD/BRAIN W/O DYE: CPT | Mod: MA

## 2022-03-28 PROCEDURE — 87641 MR-STAPH DNA AMP PROBE: CPT

## 2022-03-28 PROCEDURE — 82247 BILIRUBIN TOTAL: CPT

## 2022-03-28 PROCEDURE — 82962 GLUCOSE BLOOD TEST: CPT

## 2022-03-28 PROCEDURE — 85027 COMPLETE CBC AUTOMATED: CPT

## 2022-03-28 PROCEDURE — 71045 X-RAY EXAM CHEST 1 VIEW: CPT

## 2022-03-28 PROCEDURE — 96374 THER/PROPH/DIAG INJ IV PUSH: CPT

## 2022-03-28 PROCEDURE — 84484 ASSAY OF TROPONIN QUANT: CPT

## 2022-03-28 PROCEDURE — 83735 ASSAY OF MAGNESIUM: CPT

## 2022-03-28 PROCEDURE — 96375 TX/PRO/DX INJ NEW DRUG ADDON: CPT

## 2022-03-28 PROCEDURE — 72148 MRI LUMBAR SPINE W/O DYE: CPT

## 2022-03-28 PROCEDURE — 82248 BILIRUBIN DIRECT: CPT

## 2022-03-28 PROCEDURE — 82746 ASSAY OF FOLIC ACID SERUM: CPT

## 2022-03-28 PROCEDURE — 97116 GAIT TRAINING THERAPY: CPT

## 2022-03-28 PROCEDURE — 99285 EMERGENCY DEPT VISIT HI MDM: CPT | Mod: 25

## 2022-03-28 PROCEDURE — 72146 MRI CHEST SPINE W/O DYE: CPT

## 2022-03-28 PROCEDURE — 87635 SARS-COV-2 COVID-19 AMP PRB: CPT

## 2022-03-28 PROCEDURE — 83090 ASSAY OF HOMOCYSTEINE: CPT

## 2022-03-28 PROCEDURE — 97110 THERAPEUTIC EXERCISES: CPT

## 2022-03-28 PROCEDURE — 83036 HEMOGLOBIN GLYCOSYLATED A1C: CPT

## 2022-03-28 PROCEDURE — 93005 ELECTROCARDIOGRAM TRACING: CPT

## 2022-03-28 PROCEDURE — 99239 HOSP IP/OBS DSCHRG MGMT >30: CPT

## 2022-03-28 PROCEDURE — 83921 ORGANIC ACID SINGLE QUANT: CPT

## 2022-03-28 PROCEDURE — 87640 STAPH A DNA AMP PROBE: CPT

## 2022-03-28 PROCEDURE — 99232 SBSQ HOSP IP/OBS MODERATE 35: CPT

## 2022-03-28 PROCEDURE — 97530 THERAPEUTIC ACTIVITIES: CPT

## 2022-03-28 PROCEDURE — 85025 COMPLETE CBC W/AUTO DIFF WBC: CPT

## 2022-03-28 PROCEDURE — 82607 VITAMIN B-12: CPT

## 2022-03-28 PROCEDURE — 83690 ASSAY OF LIPASE: CPT

## 2022-03-28 PROCEDURE — 80307 DRUG TEST PRSMV CHEM ANLYZR: CPT

## 2022-03-28 PROCEDURE — 80053 COMPREHEN METABOLIC PANEL: CPT

## 2022-03-28 PROCEDURE — 76705 ECHO EXAM OF ABDOMEN: CPT

## 2022-03-28 RX ORDER — MIRTAZAPINE 45 MG/1
1 TABLET, ORALLY DISINTEGRATING ORAL
Qty: 0 | Refills: 0 | DISCHARGE
Start: 2022-03-28

## 2022-03-28 RX ORDER — LANOLIN ALCOHOL/MO/W.PET/CERES
1 CREAM (GRAM) TOPICAL
Qty: 0 | Refills: 0 | DISCHARGE
Start: 2022-03-28

## 2022-03-28 RX ORDER — LANOLIN ALCOHOL/MO/W.PET/CERES
3 CREAM (GRAM) TOPICAL
Refills: 0 | Status: DISCONTINUED | OUTPATIENT
Start: 2022-03-28 | End: 2022-03-28

## 2022-03-28 RX ADMIN — Medication 100 MILLIGRAM(S): at 12:19

## 2022-03-28 RX ADMIN — Medication 1 DROP(S): at 06:26

## 2022-03-28 RX ADMIN — PREGABALIN 1000 MICROGRAM(S): 225 CAPSULE ORAL at 12:15

## 2022-03-28 RX ADMIN — Medication 1 DROP(S): at 06:27

## 2022-03-28 RX ADMIN — PANTOPRAZOLE SODIUM 40 MILLIGRAM(S): 20 TABLET, DELAYED RELEASE ORAL at 06:26

## 2022-03-28 RX ADMIN — Medication 1 MILLIGRAM(S): at 12:14

## 2022-03-28 RX ADMIN — Medication 1 DROP(S): at 12:15

## 2022-03-28 RX ADMIN — Medication 1 TABLET(S): at 12:15

## 2022-03-28 RX ADMIN — HEPARIN SODIUM 5000 UNIT(S): 5000 INJECTION INTRAVENOUS; SUBCUTANEOUS at 06:26

## 2022-03-28 NOTE — BH CONSULTATION LIAISON PROGRESS NOTE - NSBHASSESSMENTFT_PSY_ALL_CORE
56M, unemployed, normally lives in [place] with wife and 2 adult children, with PHx of alcohol use DO and MHx of back pain, BIBEMS 3/13 after being FOF of brother's apt in [place] and admitted for alcohol withdrawal, currently on Librium taper scheduled to end on 3/22. Psych consulted today after staff reported pt said he wanted to kill his brother. On initial exam, pt presents oriented only to self and acutely delirious, insisting he is not at a medical hospital and was dropped off by his brother Ck at "some rehab place on [facility]" only a few hours ago. Pt denies any memory of saying he wanted to kill his brother, and denies any ideation, intent or plan to cause harm to brother or anyone else including himself. Given obvious severity of pt's delirium on initial interview, as well as collateral from pt's brother and wife which indicates that he has no h/o SI/HI or physical aggression, we strongly believe that pt's alleged statement was triggered by delirium and not by homicidal or aggressive intent. Accordingly, pt is psych cleared for HI as well as SI and does not appear to be in need of CO 1:1 observation. On f/u today, pt again presents much more alert and fully oriented, and reiterates that he is willing to attend an [facility] rehab in [place] as recommended. We recommend c/w mirtazapine 15 mg qhs to help with depression and insomnia, and also recommend adding melatonin 3 mg q2000 to further help with insomnia. We STRONGLY recommend that pt complete PT in-house and then proceed directly to [facility], as there is a very high risk that if he goes to [facility] first, he will never make it to [facility] rehab. Pt does not appear to present an acute risk of harm to self or others at the time of assessment, and does not appear to be in need of admission to IP psych at the time of assessment.

## 2022-03-28 NOTE — BH CONSULTATION LIAISON PROGRESS NOTE - NSBHCONSFOLLOWNEEDS_PSY_ALL_CORE
No psychiatric contraindications to discharge
Needs further psych safety assessment prior to discharge
No psychiatric contraindications to discharge

## 2022-03-28 NOTE — BH CONSULTATION LIAISON PROGRESS NOTE - NSBHCHARTREVIEWLAB_PSY_A_CORE FT
Complete Blood Count in AM (03.26.22 @ 06:01)    Nucleated RBC: 0 /100 WBCs    WBC Count: 6.21 K/uL    RBC Count: 4.05 M/uL    Hemoglobin: 14.1 g/dL    Hematocrit: 40.5 %    Mean Cell Volume: 100.0 fl    Mean Cell Hemoglobin: 34.8 pg    Mean Cell Hemoglobin Conc: 34.8 gm/dL    Red Cell Distrib Width: 11.0 %    Platelet Count - Automated: 237 K/uL  Comprehensive Metabolic Panel in AM (03.26.22 @ 06:01)    Sodium, Serum: 143 mmol/L    Potassium, Serum: 3.6 mmol/L    Chloride, Serum: 108 mmol/L    Carbon Dioxide, Serum: 30 mmol/L    Anion Gap, Serum: 5 mmol/L    Blood Urea Nitrogen, Serum: 9 mg/dL    Creatinine, Serum: 1.01 mg/dL    Glucose, Serum: 93 mg/dL    Calcium, Total Serum: 8.9 mg/dL    Protein Total, Serum: 6.5 g/dL    Albumin, Serum: 2.9 g/dL    Bilirubin Total, Serum: 0.5 mg/dL    Alkaline Phosphatase, Serum: 68 U/L    Aspartate Aminotransferase (AST/SGOT): 32 U/L    Alanine Aminotransferase (ALT/SGPT): 48 U/L DA    eGFR: 88: The estimated glomerular filtration rate (eGFR) is calculated using the  2021 CKD-EPI creatinine equation, which does not have a coefficient for  race and is validated in individuals 18 years of age and older (N Engl J  Med 2021; 385:0083-0032). Creatinine-based eGFR may be inaccurate in  various situations including but not limited to extremes of muscle mass,  altered dietary protein intake, or medications that affect renal tubular  creatinine secretion. mL/min/1.73m2  
                      15.0   5.75  )-----------( 172      ( 22 Mar 2022 08:15 )             42.5   03-22    138  |  107  |  9   ----------------------------<  82  3.5   |  22  |  0.73    Ca    9.2      22 Mar 2022 08:15    TPro  6.9  /  Alb  3.2<L>  /  TBili  1.1  /  DBili  x   /  AST  39  /  ALT  55  /  AlkPhos  73  03-22  
                      15.0   5.75  )-----------( 172      ( 22 Mar 2022 08:15 )             42.5   03-22    138  |  107  |  9   ----------------------------<  82  3.5   |  22  |  0.73    Ca    9.2      22 Mar 2022 08:15    TPro  6.9  /  Alb  3.2<L>  /  TBili  1.1  /  DBili  x   /  AST  39  /  ALT  55  /  AlkPhos  73  03-22

## 2022-03-28 NOTE — DISCHARGE NOTE NURSING/CASE MANAGEMENT/SOCIAL WORK - PATIENT PORTAL LINK FT
You can access the FollowMyHealth Patient Portal offered by [facility] by registering at the following website: [email or web address]. By joining [employer]’s FollowMyHealth portal, you will also be able to view your health information using other applications (apps) compatible with our system.

## 2022-03-28 NOTE — BH CONSULTATION LIAISON PROGRESS NOTE - NSCDBILL_PSY_A_CORE
[id number] - Inpatient, moderate complexity
82571 - Inpatient, moderate complexity
08024 - Inpatient, moderate complexity
[id number] - Inpatient, moderate complexity
[id number] - Inpatient, moderate complexity

## 2022-03-28 NOTE — BH CONSULTATION LIAISON PROGRESS NOTE - NSICDXBHPRIMARYDX_PSY_ALL_CORE
Alcohol withdrawal with delirium   F10.231  

## 2022-03-28 NOTE — BH CONSULTATION LIAISON PROGRESS NOTE - CURRENT MEDICATION
MEDICATIONS  (STANDING):  artificial  tears Solution 1 Drop(s) Both EYES two times a day  chlordiazePOXIDE 25 milliGRAM(s) Oral once  cyanocobalamin Injectable 1000 MICROGram(s) IntraMuscular <User Schedule>  dextrose 5%. 1000 milliLiter(s) (75 mL/Hr) IV Continuous <Continuous>  folic acid 1 milliGRAM(s) Oral daily  influenza   Vaccine 0.5 milliLiter(s) IntraMuscular once  multivitamin 1 Tablet(s) Oral daily  pantoprazole    Tablet 40 milliGRAM(s) Oral before breakfast  thiamine 100 milliGRAM(s) Oral daily  tobramycin 0.3% Ophthalmic Solution 1 Drop(s) Right EYE every 4 hours    MEDICATIONS  (PRN):  
MEDICATIONS  (STANDING):  artificial  tears Solution 1 Drop(s) Both EYES two times a day  cyanocobalamin 1000 MICROGram(s) Oral daily  folic acid 1 milliGRAM(s) Oral daily  heparin   Injectable 5000 Unit(s) SubCutaneous every 8 hours  influenza   Vaccine 0.5 milliLiter(s) IntraMuscular once  mirtazapine 7.5 milliGRAM(s) Oral at bedtime  multivitamin 1 Tablet(s) Oral daily  pantoprazole    Tablet 40 milliGRAM(s) Oral before breakfast  thiamine 100 milliGRAM(s) Oral daily  tobramycin 0.3% Ophthalmic Solution 1 Drop(s) Right EYE every 4 hours    MEDICATIONS  (PRN):  
MEDICATIONS  (STANDING):  artificial  tears Solution 1 Drop(s) Both EYES two times a day  cyanocobalamin 1000 MICROGram(s) Oral daily  folic acid 1 milliGRAM(s) Oral daily  heparin   Injectable 5000 Unit(s) SubCutaneous every 8 hours  influenza   Vaccine 0.5 milliLiter(s) IntraMuscular once  melatonin 3 milliGRAM(s) Oral <User Schedule>  mirtazapine 15 milliGRAM(s) Oral at bedtime  multivitamin 1 Tablet(s) Oral daily  pantoprazole    Tablet 40 milliGRAM(s) Oral before breakfast  thiamine 100 milliGRAM(s) Oral daily  tobramycin 0.3% Ophthalmic Solution 1 Drop(s) Right EYE every 4 hours    MEDICATIONS  (PRN):  
MEDICATIONS  (STANDING):  artificial  tears Solution 1 Drop(s) Both EYES two times a day  cyanocobalamin 1000 MICROGram(s) Oral daily  folic acid 1 milliGRAM(s) Oral daily  heparin   Injectable 5000 Unit(s) SubCutaneous every 8 hours  influenza   Vaccine 0.5 milliLiter(s) IntraMuscular once  multivitamin 1 Tablet(s) Oral daily  pantoprazole    Tablet 40 milliGRAM(s) Oral before breakfast  thiamine 100 milliGRAM(s) Oral daily  tobramycin 0.3% Ophthalmic Solution 1 Drop(s) Right EYE every 4 hours    MEDICATIONS  (PRN):  
MEDICATIONS  (STANDING):  artificial  tears Solution 1 Drop(s) Both EYES two times a day  cyanocobalamin Injectable 1000 MICROGram(s) IntraMuscular <User Schedule>  dextrose 5%. 1000 milliLiter(s) (75 mL/Hr) IV Continuous <Continuous>  folic acid 1 milliGRAM(s) Oral daily  influenza   Vaccine 0.5 milliLiter(s) IntraMuscular once  multivitamin 1 Tablet(s) Oral daily  pantoprazole    Tablet 40 milliGRAM(s) Oral before breakfast  thiamine 100 milliGRAM(s) Oral daily  tobramycin 0.3% Ophthalmic Solution 1 Drop(s) Right EYE every 4 hours    MEDICATIONS  (PRN):

## 2022-03-28 NOTE — BH CONSULTATION LIAISON PROGRESS NOTE - NSBHCONSULTFOLLOWAFTERCARE_PSY_A_CORE FT
Pt expected to go to [facility] rehab in [place]
Pt expected to go to [facility] rehab in [place]
Pt expected to go to [facility] rehab in [place]
Pt expected to go to [facility] rehab in [place]

## 2022-03-28 NOTE — BH CONSULTATION LIAISON PROGRESS NOTE - NSBHCHARTREVIEWVS_PSY_A_CORE FT
Vital Signs Last 24 Hrs  T(C): 35.7 (22 Mar 2022 14:19), Max: 36.8 (22 Mar 2022 05:10)  T(F): 96.3 (22 Mar 2022 14:19), Max: 98.3 (22 Mar 2022 05:10)  HR: 97 (22 Mar 2022 14:19) (82 - 97)  BP: 107/76 (22 Mar 2022 14:19) (107/76 - 123/73)  BP(mean): --  RR: 18 (22 Mar 2022 14:19) (17 - 20)  SpO2: 97% (22 Mar 2022 14:19) (96% - 100%)
Vital Signs Last 24 Hrs  T(C): 37.4 (23 Mar 2022 14:15), Max: 37.4 (23 Mar 2022 14:15)  T(F): 99.3 (23 Mar 2022 14:15), Max: 99.3 (23 Mar 2022 14:15)  HR: 107 (23 Mar 2022 14:15) (81 - 110)  BP: 135/81 (23 Mar 2022 14:15) (108/72 - 138/90)  BP(mean): 94 (23 Mar 2022 11:53) (94 - 98)  RR: 18 (23 Mar 2022 14:15) (18 - 20)  SpO2: 95% (23 Mar 2022 14:15) (95% - 98%)
Vital Signs Last 24 Hrs  T(C): 36.5 (24 Mar 2022 13:16), Max: 36.7 (23 Mar 2022 20:51)  T(F): 97.7 (24 Mar 2022 13:16), Max: 98.1 (23 Mar 2022 20:51)  HR: 86 (24 Mar 2022 15:00) (73 - 87)  BP: 128/73 (24 Mar 2022 15:00) (106/65 - 128/73)  BP(mean): --  RR: 18 (24 Mar 2022 13:16) (17 - 20)  SpO2: 96% (24 Mar 2022 15:00) (95% - 100%)
Vital Signs Last 24 Hrs  T(C): 36.7 (25 Mar 2022 05:05), Max: 36.8 (24 Mar 2022 20:42)  T(F): 98.1 (25 Mar 2022 05:05), Max: 98.3 (24 Mar 2022 20:42)  HR: 96 (25 Mar 2022 11:45) (71 - 96)  BP: 119/78 (25 Mar 2022 11:45) (108/69 - 119/78)  BP(mean): --  RR: 17 (25 Mar 2022 05:05) (17 - 20)  SpO2: 97% (25 Mar 2022 11:45) (94% - 98%)
Vital Signs Last 24 Hrs  T(C): 37.1 (28 Mar 2022 07:00), Max: 37.7 (27 Mar 2022 22:00)  T(F): 98.7 (28 Mar 2022 07:00), Max: 99.8 (27 Mar 2022 22:00)  HR: 73 (28 Mar 2022 07:00) (73 - 86)  BP: 112/69 (28 Mar 2022 07:00) (103/64 - 112/69)  BP(mean): --  RR: 20 (28 Mar 2022 07:00) (18 - 20)  SpO2: 98% (28 Mar 2022 07:00) (98% - 98%)

## 2022-03-28 NOTE — BH CONSULTATION LIAISON PROGRESS NOTE - NSBHFUPREASONCONS_PSY_A_CORE
med management/alcohol

## 2022-03-28 NOTE — BH CONSULTATION LIAISON PROGRESS NOTE - NSBHFUPINTERVALHXFT_PSY_A_CORE
Per staff reports, pt made good progress in PT over the weekend, and is now sufficiently mobile to proceed to [facility]. Pt's mirtazapine dose was increased to 15 mg over w/e by Dr. Zhang after pt reported the 7.5 mg dose had no effect on his sleep. Pt seen today for f/u, found lying in bed awake and alert. Pt is fully oriented, describes mood as "pretty good" and denies SI/HI/AVH. Pt confirms that he is doing well with PT: "I walked 5 steps without the walker." Pt says he understands he is ready for [facility] rehab, and hopes to be able to go there today. When asked about his sleep, pt reports that mirtazapine 7.5 mg did "nothing," and he is not sure whether 15 mg dose is better due to unusual sleep schedule last night (went to sleep just after 5 pm and woke up at 8:30 pm). Pt confirms he has had problems with sleep for much of his life. MD recommends pt make an effort to avoid daytime napping, and also recommends melatonin 3 mg q8pm to help reset pt's circadian rhythm. Pt agrees.

## 2022-03-28 NOTE — DISCHARGE NOTE NURSING/CASE MANAGEMENT/SOCIAL WORK - NSDCPEFALRISK_GEN_ALL_CORE
For information on Fall & Injury Prevention, visit: [email or web address] OR  [email or web address] OR  https://www.cdc.gov/steadi/patient.html

## 2022-03-28 NOTE — BH CONSULTATION LIAISON PROGRESS NOTE - NSBHFUPINTERVALCCFT_PSY_A_CORE
"I think you're right"
"I can barely walk"
"They won't let me walk without an escort"
"They won't let me walk without an escort"
"I did really well today"

## 2022-03-28 NOTE — BH CONSULTATION LIAISON PROGRESS NOTE - NSBHCONSULTRECOMMENDOTHER_PSY_A_CORE FT
1. C/w mirtazapine 715 mg qhs to help with depression and insomnia  2. Start melatonin 3 mg q2000 to help with circadian rhythm regulation  3. Medical management as directed by primary team  --Pt's Librium taper is completed. Would NOT restart any withdrawal medications  4. Psychiatry is signing off. Reconsult if additional issues arise as inpatient  5. SW is in contact with pt's wife regarding dispo to [facility] in [place] at program identified by wife. STRONGLY recommend pt proceed directly to [facility] re[facility] rather than going to [facility] first  6. Case d/w DEWAYNE Reed of primary team    Lori Chau MD  Director, Consultation-Liaison Psychiatry Service  [id number]

## 2022-12-05 NOTE — DISCHARGE NOTE NURSING/CASE MANAGEMENT/SOCIAL WORK - HAS THE PATIENT USED TOBACCO IN THE PAST 30 DAYS?
Hospitalist Progress Note    NAME: Moris Villalobos   :  1943   MRN:  [id number]     Admit date: 2022    Today's date: 22    PCP: Margarita Burris MD    Anticipated discharge date: ? Barriers:  wound care, LTC/SNF placement with Hospice care if pt not IP hospice candidate    Assessment / Plan:    Severe sepsis POA WBC 15.6, , RR 35, lactate 3.19  Proteus bacteremia POA  Necrotic sacral ulcer with extensive osteomyelitis POA  Lactic acidosis POA  Unstageable Multiple pressure injuries POA- R heel, LHeel, L upper back, R calf, L calf, L Hip  Debility  Prior CVA with residual left-sided deficits  CT abdomen/pelvis IMPRESSION  1. Large sacral decubitus ulcer with associated extensive osteomyelitis of the  distal sacrum and coccyx with associated erosive changes, as well as gas and  fluid containing collection within the posterior subcutaneous soft tissues  communicating with the skin surface as above, right larger than left. 2. Distended gallbladder with cholelithiasis, but no convincing CT evidence of  acute cholecystitis. General surgery consulted, OR on 2022   POSTOPERATIVE DIAGNOSIS:  Extensive septic sacral deep tissue injury with abscess. PROCEDURE PERFORMED:  Debridement of sacral deep tissue injury including skin, subcutaneous tissue and muscle.   Wound culture from [facility]   4+ [address] 69   GRAM STAIN   3+ GRAM POSITIVE 1 [facility]   Culture result:  PENDING P       Continue empiric Zosyn and vancomycin for now  [facility] with proteus  Wound care consulted  Continue Walker catheter to promote wound healing  Incentive spirometry to prevent atelectasis  Nutrition consulted for supplementation recommendations to assist in wound healing  PT/OT consulted-- needs LTC placement if family unable to care at home due to extensive wound care needed  Discussed with family patient would likely require rehab facility if not long-term care, Pt not IP Hospice candidate per eval 12/2 by hospice-- discussed with daughter-- aware, will likely need placement at LTC +/- Comfort care once arranged  Speech following  Consult ID appreciated-- recommends Zosyn till 12/5 then IV Unasyn for 6 weeks if plan is not comfort care, DCd Vancomycin 12/4 as repeat blood Cx (12/2) remains negative x 3 days        Diabetes mellitus type 2 POA   Hemoglobin A1c 6.8 this admission    Cont Decreased lantus at 7 units nightly now  Change SSI to QID  Add pre meal insulin pending on sugars     Hypomagnesemia 1.6   Hypokalemia- 3.6 now    S/p Mag IV 1 g x 1 12/1    UTI due to chronic indwelling Walker POA  UA nitrite +, > 100 WBC, 5 to 10 RBC, 2+ bacteremia  Urine culture with GNR  Walker care ordered  Continue IV antibiotics as above per ID recc     Essential hypertension POA  Paroxysmal atrial fibrillation  Paroxsymal Vtachs noted  Hyperlipidemia POA  Continue PTA amlodipine, aspirin, carvedilol, clonidine, pravastatin  Replenish Lytes-- Mag as above  Pt is DNR     Dementia  Monitor for signs of delirium       Incidental findings requiring outpatient follow up/ evaluation:  -sclerosis noted in the bilateral femoral heads, suspicious for  avascular necrosis  -Gallbladder is distended and contains multiple stones, but  without gallbladder wall thickening or surrounding fat stranding. No  intrahepatic or extrahepatic biliary ductal dilatation     Code Status: DNR-confirmed with daughter and patient  Palliative consult appreciated-- Daughter initially wanted Aggressive care but has decided to consider Hospice- IP versus  LTC placement +/- Hospice if not deemed IP Hospice candidate  IP Hospice eval noted- pt not GIP candidate yet- CM working on alternative LTC placement options now with daughter     DVT Prophylaxis: Lovenox    Body mass index is 23.91 kg/m².       Subjective:     Chief Complaint / Reason for Physician Visit  Opens eyes, not talking much. Discussed with RN events overnight. \"I am ok\"  Alert, very confused  Not able to provide history due to Dementia  BC positive for Proteus    Review of Systems:  Symptom Y/N Comments  Symptom Y/N Comments   Fever/Chills    Chest Pain     Poor Appetite    Edema     Cough    Abdominal Pain     Sputum    Joint Pain     SOB/MARTINEZ    Headache     Nausea/vomit    Tolerating PT/OT     Diarrhea    Tolerating Diet     Constipation    Other       Could NOT obtain due to: Dementia     Objective:     VITALS:   Last 24hrs VS reviewed since prior progress note. Most recent are:  Patient Vitals for the past 24 hrs:   Temp Pulse Resp BP SpO2   12/05/22 1110 98.6 °F (37 °C) 72 20 (!) 130/53 100 %   12/05/22 0736 97.5 °F (36.4 °C) 74 18 (!) 132/45 99 %   12/05/22 0339 -- 79 -- (!) 127/46 --   12/04/22 2009 98.2 °F (36.8 °C) 65 -- (!) 120/44 99 %   12/04/22 1539 97.3 °F (36.3 °C) 68 20 (!) 112/52 100 %         Intake/Output Summary (Last 24 hours) at 12/5/2022 1317  Last data filed at 12/4/2022 1657  Gross per 24 hour   Intake --   Output 700 ml   Net -700 ml          Wt Readings from Last 12 Encounters:   11/29/22 57.4 kg (126 lb 8.7 oz)   07/27/22 99.8 kg (220 lb)   06/01/22 99.8 kg (220 lb)   05/05/22 99.8 kg (220 lb)   03/01/22 99.8 kg (220 lb)   01/26/22 99.8 kg (220 lb)   06/09/21 99.8 kg (220 lb)   06/01/21 93 kg (205 lb)   12/03/20 96.2 kg (212 lb)   10/01/20 96.2 kg (212 lb)   03/12/20 96.2 kg (212 lb)   06/21/19 96.2 kg (212 lb)       PHYSICAL EXAM:    I had a face to face encounter and independently examined this patient on 12/05/22 as outlined below:    General: WD, WN.lethargic, cooperative, no acute distress    EENT:  PERRL. Anicteric sclerae. MMM  Neck:  No meningismus, no thyromegaly  Resp:  CTA bilaterally, no wheezing or rales. No accessory muscle use  CV:  Regular  rhythm,  No edema  GI:  Soft, Non distended, Non tender.   +Bowel sounds, no rebound  Neurologic:  Lethargic, confused, not talking much, non focal motor exam  Psych:   Not anxious nor agitated  Skin:  No rashes. No jaundice, wound in sacrum bandaged, not viewed          Reviewed most current lab test results and cultures  YES  Reviewed most current radiology test results   YES  Review and summation of old records today    NO  Reviewed patient's current orders and MAR    YES  PMH/SH reviewed - no change compared to H&P  ________________________________________________________________________  Care Plan discussed with:    Comments   Patient x    Family      RN x    Care Manager x [place]   Consultant  x Dr Gricelda Mock (ID)                    x Multidiciplinary team rounds were held today with , nursing, pharmacist and clinical coordinator. Patient's plan of care was discussed; medications were reviewed and discharge planning was addressed. ________________________________________________________________________  Total time: 16 mins    Comments   >50% of visit spent in counseling and coordination of care x    ________________________________________________________________________  Sowmya Keyes MD     Procedures: see electronic medical records for all procedures/Xrays and details which were not copied into this note but were reviewed prior to creation of Plan. LABS:  I reviewed today's most current labs and imaging studies. Pertinent labs include:  No results for input(s): WBC, HGB, HCT, PLT, HGBEXT, HCTEXT, PLTEXT, HGBEXT, HCTEXT, PLTEXT in the last 72 hours.     Recent Labs     12/04/22  0138      K 3.8   *   CO2 30   *   BUN 14   CREA 0.78   CA 8.1* Yes

## 2023-09-04 ENCOUNTER — HOSPITAL ENCOUNTER (EMERGENCY)
Age: 58
Discharge: HOME | End: 2023-09-04
Attending: STUDENT IN AN ORGANIZED HEALTH CARE EDUCATION/TRAINING PROGRAM
Payer: COMMERCIAL

## 2023-09-04 ENCOUNTER — APPOINTMENT (OUTPATIENT)
Dept: CT IMAGING | Age: 58
End: 2023-09-04
Payer: COMMERCIAL

## 2023-09-04 VITALS
WEIGHT: 174 LBS | RESPIRATION RATE: 21 BRPM | HEIGHT: 74 IN | BODY MASS INDEX: 22.33 KG/M2 | SYSTOLIC BLOOD PRESSURE: 154 MMHG | TEMPERATURE: 98.4 F | DIASTOLIC BLOOD PRESSURE: 86 MMHG | HEART RATE: 78 BPM | OXYGEN SATURATION: 99 %

## 2023-09-04 DIAGNOSIS — F10.10 ETOH ABUSE: ICD-10-CM

## 2023-09-04 DIAGNOSIS — R20.0 NUMBNESS IN FEET: ICD-10-CM

## 2023-09-04 DIAGNOSIS — R10.84 GENERALIZED ABDOMINAL PAIN: Primary | ICD-10-CM

## 2023-09-04 DIAGNOSIS — R11.2 NAUSEA AND VOMITING, UNSPECIFIED VOMITING TYPE: ICD-10-CM

## 2023-09-04 LAB
ALBUMIN SERPL-MCNC: 4.1 G/DL (ref 3.4–5)
ALBUMIN/GLOB SERPL: 1.5 {RATIO} (ref 1.1–2.2)
ALP SERPL-CCNC: 83 U/L (ref 40–129)
ALT SERPL-CCNC: 100 U/L (ref 10–40)
ANION GAP SERPL CALCULATED.3IONS-SCNC: 11 MMOL/L (ref 3–16)
AST SERPL-CCNC: 140 U/L (ref 15–37)
BASOPHILS # BLD: 0 K/UL (ref 0–0.2)
BASOPHILS NFR BLD: 0.3 %
BILIRUB SERPL-MCNC: 0.9 MG/DL (ref 0–1)
BILIRUB UR QL STRIP.AUTO: NEGATIVE
BUN SERPL-MCNC: 9 MG/DL (ref 7–20)
CALCIUM SERPL-MCNC: 10 MG/DL (ref 8.3–10.6)
CHLORIDE SERPL-SCNC: 97 MMOL/L (ref 99–110)
CK SERPL-CCNC: 105 U/L (ref 39–308)
CLARITY UR: CLEAR
CO2 SERPL-SCNC: 29 MMOL/L (ref 21–32)
COLOR UR: YELLOW
CREAT SERPL-MCNC: 0.7 MG/DL (ref 0.9–1.3)
DEPRECATED RDW RBC AUTO: 12.8 % (ref 12.4–15.4)
EOSINOPHIL # BLD: 0 K/UL (ref 0–0.6)
EOSINOPHIL NFR BLD: 0.5 %
EPI CELLS #/AREA URNS HPF: NORMAL /HPF (ref 0–5)
ETHANOLAMINE SERPL-MCNC: 30 MG/DL (ref 0–0.08)
GFR SERPLBLD CREATININE-BSD FMLA CKD-EPI: >60 ML/MIN/{1.73_M2}
GLUCOSE SERPL-MCNC: 111 MG/DL (ref 70–99)
GLUCOSE UR STRIP.AUTO-MCNC: NEGATIVE MG/DL
HCT VFR BLD AUTO: 39.3 % (ref 40.5–52.5)
HGB BLD-MCNC: 13.3 G/DL (ref 13.5–17.5)
HGB UR QL STRIP.AUTO: ABNORMAL
KETONES UR STRIP.AUTO-MCNC: NEGATIVE MG/DL
LACTATE BLDV-SCNC: 1.8 MMOL/L (ref 0.4–2)
LACTATE BLDV-SCNC: 2.2 MMOL/L (ref 0.4–2)
LEUKOCYTE ESTERASE UR QL STRIP.AUTO: NEGATIVE
LIPASE SERPL-CCNC: 31 U/L (ref 13–60)
LYMPHOCYTES # BLD: 2.3 K/UL (ref 1–5.1)
LYMPHOCYTES NFR BLD: 41.9 %
MAGNESIUM SERPL-MCNC: 1.5 MG/DL (ref 1.8–2.4)
MCH RBC QN AUTO: 34.4 PG (ref 26–34)
MCHC RBC AUTO-ENTMCNC: 33.8 G/DL (ref 31–36)
MCV RBC AUTO: 101.8 FL (ref 80–100)
MONOCYTES # BLD: 0.9 K/UL (ref 0–1.3)
MONOCYTES NFR BLD: 15.5 %
NEUTROPHILS # BLD: 2.3 K/UL (ref 1.7–7.7)
NEUTROPHILS NFR BLD: 41.8 %
NITRITE UR QL STRIP.AUTO: NEGATIVE
NT-PROBNP SERPL-MCNC: <36 PG/ML (ref 0–124)
PH UR STRIP.AUTO: 6.5 [PH] (ref 5–8)
PHOSPHATE SERPL-MCNC: 4.3 MG/DL (ref 2.5–4.9)
PLATELET # BLD AUTO: 68 K/UL (ref 135–450)
PLATELET BLD QL SMEAR: ABNORMAL
PMV BLD AUTO: 8 FL (ref 5–10.5)
POTASSIUM SERPL-SCNC: 3.3 MMOL/L (ref 3.5–5.1)
PROT SERPL-MCNC: 6.9 G/DL (ref 6.4–8.2)
PROT UR STRIP.AUTO-MCNC: NEGATIVE MG/DL
RBC # BLD AUTO: 3.86 M/UL (ref 4.2–5.9)
RBC #/AREA URNS HPF: NORMAL /HPF (ref 0–4)
REASON FOR REJECTION: NORMAL
REJECTED TEST: NORMAL
SLIDE REVIEW: ABNORMAL
SODIUM SERPL-SCNC: 137 MMOL/L (ref 136–145)
SP GR UR STRIP.AUTO: 1.01 (ref 1–1.03)
TROPONIN, HIGH SENSITIVITY: 11 NG/L (ref 0–22)
TROPONIN, HIGH SENSITIVITY: 11 NG/L (ref 0–22)
TSH SERPL DL<=0.005 MIU/L-ACNC: 3.2 UIU/ML (ref 0.27–4.2)
UA COMPLETE W REFLEX CULTURE PNL UR: ABNORMAL
UA DIPSTICK W REFLEX MICRO PNL UR: YES
URN SPEC COLLECT METH UR: ABNORMAL
UROBILINOGEN UR STRIP-ACNC: 0.2 E.U./DL
WBC # BLD AUTO: 5.5 K/UL (ref 4–11)
WBC #/AREA URNS HPF: NORMAL /HPF (ref 0–5)

## 2023-09-04 PROCEDURE — 82607 VITAMIN B-12: CPT

## 2023-09-04 PROCEDURE — 83880 ASSAY OF NATRIURETIC PEPTIDE: CPT

## 2023-09-04 PROCEDURE — 96365 THER/PROPH/DIAG IV INF INIT: CPT

## 2023-09-04 PROCEDURE — 93005 ELECTROCARDIOGRAM TRACING: CPT | Performed by: STUDENT IN AN ORGANIZED HEALTH CARE EDUCATION/TRAINING PROGRAM

## 2023-09-04 PROCEDURE — 82077 ASSAY SPEC XCP UR&BREATH IA: CPT

## 2023-09-04 PROCEDURE — 36415 COLL VENOUS BLD VENIPUNCTURE: CPT

## 2023-09-04 PROCEDURE — 6360000002 HC RX W HCPCS: Performed by: STUDENT IN AN ORGANIZED HEALTH CARE EDUCATION/TRAINING PROGRAM

## 2023-09-04 PROCEDURE — 6370000000 HC RX 637 (ALT 250 FOR IP): Performed by: STUDENT IN AN ORGANIZED HEALTH CARE EDUCATION/TRAINING PROGRAM

## 2023-09-04 PROCEDURE — 83605 ASSAY OF LACTIC ACID: CPT

## 2023-09-04 PROCEDURE — 82550 ASSAY OF CK (CPK): CPT

## 2023-09-04 PROCEDURE — 84484 ASSAY OF TROPONIN QUANT: CPT

## 2023-09-04 PROCEDURE — 99285 EMERGENCY DEPT VISIT HI MDM: CPT

## 2023-09-04 PROCEDURE — 80053 COMPREHEN METABOLIC PANEL: CPT

## 2023-09-04 PROCEDURE — 84100 ASSAY OF PHOSPHORUS: CPT

## 2023-09-04 PROCEDURE — 81001 URINALYSIS AUTO W/SCOPE: CPT

## 2023-09-04 PROCEDURE — 84443 ASSAY THYROID STIM HORMONE: CPT

## 2023-09-04 PROCEDURE — 2580000003 HC RX 258: Performed by: STUDENT IN AN ORGANIZED HEALTH CARE EDUCATION/TRAINING PROGRAM

## 2023-09-04 PROCEDURE — 83690 ASSAY OF LIPASE: CPT

## 2023-09-04 PROCEDURE — 74174 CTA ABD&PLVS W/CONTRAST: CPT

## 2023-09-04 PROCEDURE — 96366 THER/PROPH/DIAG IV INF ADDON: CPT

## 2023-09-04 PROCEDURE — 82746 ASSAY OF FOLIC ACID SERUM: CPT

## 2023-09-04 PROCEDURE — 85025 COMPLETE CBC W/AUTO DIFF WBC: CPT

## 2023-09-04 PROCEDURE — 83735 ASSAY OF MAGNESIUM: CPT

## 2023-09-04 PROCEDURE — 6360000004 HC RX CONTRAST MEDICATION: Performed by: STUDENT IN AN ORGANIZED HEALTH CARE EDUCATION/TRAINING PROGRAM

## 2023-09-04 PROCEDURE — 96361 HYDRATE IV INFUSION ADD-ON: CPT

## 2023-09-04 RX ORDER — MAGNESIUM SULFATE IN WATER 40 MG/ML
2000 INJECTION, SOLUTION INTRAVENOUS ONCE
Status: COMPLETED | OUTPATIENT
Start: 2023-09-04 | End: 2023-09-04

## 2023-09-04 RX ORDER — M-VIT,TX,IRON,MINS/CALC/FOLIC 27MG-0.4MG
1 TABLET ORAL DAILY
Status: DISCONTINUED | OUTPATIENT
Start: 2023-09-04 | End: 2023-09-04 | Stop reason: HOSPADM

## 2023-09-04 RX ORDER — SODIUM CHLORIDE 0.9 % (FLUSH) 0.9 %
5-40 SYRINGE (ML) INJECTION PRN
Status: DISCONTINUED | OUTPATIENT
Start: 2023-09-04 | End: 2023-09-04 | Stop reason: HOSPADM

## 2023-09-04 RX ORDER — SODIUM CHLORIDE 9 MG/ML
INJECTION, SOLUTION INTRAVENOUS PRN
Status: DISCONTINUED | OUTPATIENT
Start: 2023-09-04 | End: 2023-09-04 | Stop reason: HOSPADM

## 2023-09-04 RX ORDER — SUCRALFATE ORAL 1 G/10ML
1 SUSPENSION ORAL 4 TIMES DAILY
Qty: 1200 ML | Refills: 3 | Status: SHIPPED | OUTPATIENT
Start: 2023-09-04

## 2023-09-04 RX ORDER — SODIUM CHLORIDE, SODIUM LACTATE, POTASSIUM CHLORIDE, AND CALCIUM CHLORIDE .6; .31; .03; .02 G/100ML; G/100ML; G/100ML; G/100ML
1000 INJECTION, SOLUTION INTRAVENOUS ONCE
Status: COMPLETED | OUTPATIENT
Start: 2023-09-04 | End: 2023-09-04

## 2023-09-04 RX ORDER — SODIUM CHLORIDE 0.9 % (FLUSH) 0.9 %
5-40 SYRINGE (ML) INJECTION EVERY 12 HOURS SCHEDULED
Status: DISCONTINUED | OUTPATIENT
Start: 2023-09-04 | End: 2023-09-04 | Stop reason: HOSPADM

## 2023-09-04 RX ORDER — PANTOPRAZOLE SODIUM 20 MG/1
20 TABLET, DELAYED RELEASE ORAL DAILY
Qty: 30 TABLET | Refills: 3 | Status: SHIPPED | OUTPATIENT
Start: 2023-09-04

## 2023-09-04 RX ORDER — ONDANSETRON 4 MG/1
4 TABLET, FILM COATED ORAL 3 TIMES DAILY PRN
Qty: 15 TABLET | Refills: 0 | Status: SHIPPED | OUTPATIENT
Start: 2023-09-04

## 2023-09-04 RX ORDER — LANOLIN ALCOHOL/MO/W.PET/CERES
100 CREAM (GRAM) TOPICAL DAILY
Status: DISCONTINUED | OUTPATIENT
Start: 2023-09-04 | End: 2023-09-04 | Stop reason: HOSPADM

## 2023-09-04 RX ORDER — POTASSIUM CHLORIDE 20 MEQ/1
40 TABLET, EXTENDED RELEASE ORAL ONCE
Status: COMPLETED | OUTPATIENT
Start: 2023-09-04 | End: 2023-09-04

## 2023-09-04 RX ADMIN — Medication 1 TABLET: at 13:49

## 2023-09-04 RX ADMIN — SODIUM CHLORIDE, POTASSIUM CHLORIDE, SODIUM LACTATE AND CALCIUM CHLORIDE 1000 ML: 600; 310; 30; 20 INJECTION, SOLUTION INTRAVENOUS at 11:56

## 2023-09-04 RX ADMIN — Medication 100 MG: at 13:49

## 2023-09-04 RX ADMIN — IOPAMIDOL 75 ML: 755 INJECTION, SOLUTION INTRAVENOUS at 13:43

## 2023-09-04 RX ADMIN — MAGNESIUM SULFATE HEPTAHYDRATE 2000 MG: 40 INJECTION, SOLUTION INTRAVENOUS at 13:53

## 2023-09-04 RX ADMIN — POTASSIUM CHLORIDE 40 MEQ: 1500 TABLET, EXTENDED RELEASE ORAL at 13:49

## 2023-09-04 ASSESSMENT — PAIN DESCRIPTION - PAIN TYPE: TYPE: ACUTE PAIN

## 2023-09-04 ASSESSMENT — PAIN SCALES - GENERAL: PAINLEVEL_OUTOF10: 4

## 2023-09-04 ASSESSMENT — PAIN DESCRIPTION - LOCATION: LOCATION: LEG

## 2023-09-04 ASSESSMENT — PAIN DESCRIPTION - ORIENTATION: ORIENTATION: RIGHT;LEFT

## 2023-09-04 ASSESSMENT — PAIN - FUNCTIONAL ASSESSMENT: PAIN_FUNCTIONAL_ASSESSMENT: 0-10

## 2023-09-04 ASSESSMENT — PAIN DESCRIPTION - FREQUENCY: FREQUENCY: CONTINUOUS

## 2023-09-04 ASSESSMENT — PAIN DESCRIPTION - DESCRIPTORS: DESCRIPTORS: PINS AND NEEDLES

## 2023-09-04 NOTE — DISCHARGE INSTRUCTIONS
As discussed, your CAT scan was normal.  Please take the stomach medicines as prescribed. Please schedule follow-up with your primary care doctor as soon as possible. Return for worsening symptoms.

## 2023-09-04 NOTE — ED NOTES
All follow up care discussed. Pt verbalized understandings. PIV removed and bleeding controlled. Prescriptions discussed. No other concerns voiced.  Stable and ambulatory upon dismissal.      [address] [facility] Peng, JENNA  09/04/23 1303

## 2023-09-04 NOTE — ED NOTES
Pt returned from CT scan. Pt stating, \"I feel like my heart is racing\". Pt states he noticed his HR \"racing\" after CT IV dye was given. No other  reaction noted. HR 76 at this time. SpO2 100% on RA. Skin warm and dry. Pt talking in full and complete sentences.       [address] [facility] JENNA Khoury  09/04/23 1132

## 2023-09-05 LAB
EKG ATRIAL RATE: 81 BPM
EKG DIAGNOSIS: NORMAL
EKG P AXIS: 40 DEGREES
EKG P-R INTERVAL: 170 MS
EKG Q-T INTERVAL: 384 MS
EKG QRS DURATION: 76 MS
EKG QTC CALCULATION (BAZETT): 446 MS
EKG R AXIS: 57 DEGREES
EKG T AXIS: 54 DEGREES
EKG VENTRICULAR RATE: 81 BPM
FOLATE SERPL-MCNC: 9.7 NG/ML (ref 4.78–24.2)
VIT B12 SERPL-MCNC: 473 PG/ML (ref 211–911)

## 2023-09-05 PROCEDURE — 93010 ELECTROCARDIOGRAM REPORT: CPT | Performed by: INTERNAL MEDICINE

## 2023-09-06 ENCOUNTER — TELEPHONE (OUTPATIENT)
Dept: PRIMARY CARE CLINIC | Age: 58
End: 2023-09-06

## 2023-09-06 NOTE — TELEPHONE ENCOUNTER
----- Message from Nuha Pulliam MD sent at 9/5/2023  8:22 AM EDT -----    Call pt to schedule new pt visit/ ED visit. Try to get hold of pt 2-3 times. Document each try.       If not able to get ahold of pt, or if pt declined, please close encounter       ----- Message -----  From: Zenia Skelton MD  Sent: 9/4/2023   4:00 PM EDT  To: Nuha Pulliam MD

## 2023-10-19 ENCOUNTER — OFFICE VISIT (OUTPATIENT)
Dept: PRIMARY CARE CLINIC | Age: 58
End: 2023-10-19

## 2023-10-19 VITALS
DIASTOLIC BLOOD PRESSURE: 84 MMHG | BODY MASS INDEX: 22.21 KG/M2 | TEMPERATURE: 97.4 F | SYSTOLIC BLOOD PRESSURE: 138 MMHG | HEART RATE: 85 BPM | OXYGEN SATURATION: 97 % | WEIGHT: 173 LBS

## 2023-10-19 DIAGNOSIS — F41.9 ANXIETY: ICD-10-CM

## 2023-10-19 DIAGNOSIS — F32.A DEPRESSION, UNSPECIFIED DEPRESSION TYPE: Primary | ICD-10-CM

## 2023-10-19 DIAGNOSIS — R79.89 ELEVATED LFTS: ICD-10-CM

## 2023-10-19 DIAGNOSIS — Z11.4 SCREENING FOR HIV WITHOUT PRESENCE OF RISK FACTORS: ICD-10-CM

## 2023-10-19 DIAGNOSIS — G89.29 CHRONIC BILATERAL LOW BACK PAIN WITHOUT SCIATICA: ICD-10-CM

## 2023-10-19 DIAGNOSIS — M54.50 CHRONIC BILATERAL LOW BACK PAIN WITHOUT SCIATICA: ICD-10-CM

## 2023-10-19 DIAGNOSIS — R63.4 UNINTENTIONAL WEIGHT LOSS: ICD-10-CM

## 2023-10-19 DIAGNOSIS — R20.0 NUMBNESS AND TINGLING OF BOTH LOWER EXTREMITIES: ICD-10-CM

## 2023-10-19 DIAGNOSIS — Z87.891 PERSONAL HISTORY OF TOBACCO USE: ICD-10-CM

## 2023-10-19 DIAGNOSIS — D53.9 MACROCYTIC ANEMIA: ICD-10-CM

## 2023-10-19 DIAGNOSIS — Z11.59 NEED FOR HEPATITIS C SCREENING TEST: ICD-10-CM

## 2023-10-19 DIAGNOSIS — F10.90 ALCOHOL USE DISORDER: ICD-10-CM

## 2023-10-19 DIAGNOSIS — I10 PRIMARY HYPERTENSION: ICD-10-CM

## 2023-10-19 DIAGNOSIS — R20.2 NUMBNESS AND TINGLING OF BOTH LOWER EXTREMITIES: ICD-10-CM

## 2023-10-19 DIAGNOSIS — D69.6 THROMBOCYTOPENIA (HCC): ICD-10-CM

## 2023-10-19 DIAGNOSIS — Z23 NEED FOR INFLUENZA VACCINATION: ICD-10-CM

## 2023-10-19 RX ORDER — ESCITALOPRAM OXALATE 5 MG/1
5 TABLET ORAL DAILY
Qty: 30 TABLET | Refills: 0 | Status: SHIPPED | OUTPATIENT
Start: 2023-10-19

## 2023-10-19 SDOH — ECONOMIC STABILITY: FOOD INSECURITY: WITHIN THE PAST 12 MONTHS, THE FOOD YOU BOUGHT JUST DIDN'T LAST AND YOU DIDN'T HAVE MONEY TO GET MORE.: NEVER TRUE

## 2023-10-19 SDOH — ECONOMIC STABILITY: FOOD INSECURITY: WITHIN THE PAST 12 MONTHS, YOU WORRIED THAT YOUR FOOD WOULD RUN OUT BEFORE YOU GOT MONEY TO BUY MORE.: NEVER TRUE

## 2023-10-19 SDOH — ECONOMIC STABILITY: INCOME INSECURITY: HOW HARD IS IT FOR YOU TO PAY FOR THE VERY BASICS LIKE FOOD, HOUSING, MEDICAL CARE, AND HEATING?: NOT HARD AT ALL

## 2023-10-19 SDOH — ECONOMIC STABILITY: HOUSING INSECURITY
IN THE LAST 12 MONTHS, WAS THERE A TIME WHEN YOU DID NOT HAVE A STEADY PLACE TO SLEEP OR SLEPT IN A SHELTER (INCLUDING NOW)?: NO

## 2023-10-19 ASSESSMENT — PATIENT HEALTH QUESTIONNAIRE - PHQ9
SUM OF ALL RESPONSES TO PHQ QUESTIONS 1-9: 23
10. IF YOU CHECKED OFF ANY PROBLEMS, HOW DIFFICULT HAVE THESE PROBLEMS MADE IT FOR YOU TO DO YOUR WORK, TAKE CARE OF THINGS AT HOME, OR GET ALONG WITH OTHER PEOPLE: 3
SUM OF ALL RESPONSES TO PHQ QUESTIONS 1-9: 26
4. FEELING TIRED OR HAVING LITTLE ENERGY: 3
SUM OF ALL RESPONSES TO PHQ9 QUESTIONS 1 & 2: 5
3. TROUBLE FALLING OR STAYING ASLEEP: 3
2. FEELING DOWN, DEPRESSED OR HOPELESS: 3
SUM OF ALL RESPONSES TO PHQ QUESTIONS 1-9: 26
9. THOUGHTS THAT YOU WOULD BE BETTER OFF DEAD, OR OF HURTING YOURSELF: 3
8. MOVING OR SPEAKING SO SLOWLY THAT OTHER PEOPLE COULD HAVE NOTICED. OR THE OPPOSITE, BEING SO FIGETY OR RESTLESS THAT YOU HAVE BEEN MOVING AROUND A LOT MORE THAN USUAL: 3
5. POOR APPETITE OR OVEREATING: 3
SUM OF ALL RESPONSES TO PHQ QUESTIONS 1-9: 26
1. LITTLE INTEREST OR PLEASURE IN DOING THINGS: 2
7. TROUBLE CONCENTRATING ON THINGS, SUCH AS READING THE NEWSPAPER OR WATCHING TELEVISION: 3
6. FEELING BAD ABOUT YOURSELF - OR THAT YOU ARE A FAILURE OR HAVE LET YOURSELF OR YOUR FAMILY DOWN: 3

## 2023-10-19 ASSESSMENT — ANXIETY QUESTIONNAIRES
6. BECOMING EASILY ANNOYED OR IRRITABLE: 3
4. TROUBLE RELAXING: 3
GAD7 TOTAL SCORE: 21
2. NOT BEING ABLE TO STOP OR CONTROL WORRYING: 3
5. BEING SO RESTLESS THAT IT IS HARD TO SIT STILL: 3
IF YOU CHECKED OFF ANY PROBLEMS ON THIS QUESTIONNAIRE, HOW DIFFICULT HAVE THESE PROBLEMS MADE IT FOR YOU TO DO YOUR WORK, TAKE CARE OF THINGS AT HOME, OR GET ALONG WITH OTHER PEOPLE: EXTREMELY DIFFICULT
7. FEELING AFRAID AS IF SOMETHING AWFUL MIGHT HAPPEN: 3
3. WORRYING TOO MUCH ABOUT DIFFERENT THINGS: 3
1. FEELING NERVOUS, ANXIOUS, OR ON EDGE: 3

## 2023-10-19 NOTE — PROGRESS NOTES
Discussed with the patient the current USPSTF guidelines released March 9, 2021 for screening for lung cancer. For adults aged 48 to 80 years who have a 20 pack-year smoking history and currently smoke or have quit within the past 15 years the grade B recommendation is to:  Screen for lung cancer with low-dose computed tomography (LDCT) every year. Stop screening once a person has not smoked for 15 years or has a health problem that limits life expectancy or the ability to have lung surgery. The patient  reports that he has been smoking cigarettes. He started smoking about 44 years ago. He has a 44.00 pack-year smoking history. He has never used smokeless tobacco.. Discussed with patient the risks and benefits of screening, including over-diagnosis, false positive rate, and total radiation exposure. The patient currently exhibits no signs or symptoms suggestive of lung cancer. Discussed with patient the importance of compliance with yearly annual lung cancer screenings and willingness to undergo diagnosis and treatment if screening scan is positive. In addition, the patient was counseled regarding the importance of remaining smoke free and/or total smoking cessation.     Also reviewed the following if the patient has Medicare that as of February 10, 2022, Medicare only covers LDCT screening in patients aged 53-69 with at least a 20 pack-year smoking history who currently smoke or have quit in the last 15 years

## 2023-10-20 ASSESSMENT — ENCOUNTER SYMPTOMS
CONSTIPATION: 0
DIARRHEA: 0
VOMITING: 0
BLOOD IN STOOL: 0
SHORTNESS OF BREATH: 0
NAUSEA: 0
ABDOMINAL PAIN: 0
CHEST TIGHTNESS: 0
ANAL BLEEDING: 0
BACK PAIN: 1
WHEEZING: 0
COUGH: 1

## 2023-10-20 NOTE — PROGRESS NOTES
[facility]                                  [address] [place] [address], [address] [place] [address], [address] [place] [address] Drive [address]         Phone: [phone]    Date of Service:  10/19/2023     Patient ID: Armida Iraheta is a 63 y.o. male      Subjective:     CC: Establish care    HPI  63year old patient came to clinic to establish care. Past medical history:   High blood pressure that is controlled without medicine   Stomach ulcers currently on no medication   Alcohol use disorder. Tobacco use disorder. Degenerative disc disease  New onset weight loss  New onset bilateral lower extremity paresthesia  Past surgical history: Tonsillectomy at age 9  Appendectomy at age 22  Allergies:  Denies allergies to food, medications, environmental  Medications:  Denies taking any medications today including NSAIDS and vitamins  Family history:   Father: unknown   Mother: stroke, HTN, MI, breast cancer   Brother: diagnosed with colon cancer 3 years ago  Social history:   Tobacco: 40 pack year smoking history   Alcohol: 2 shots of vodka a day   Exercise: walks dog when possible   Occupation: worked on [employer] for 30+ years. Currently unemployed  Health maintenance:   Has not received recent Influenza vaccine   Unsure on last colonoscopy. Last estimate of 5 years ago   Unsure on last CT scan of chest      Weight loss:  Patient has been experiencing weight loss over the past eight months to a year. He states that eight months ago, he weighed 230 pounds. He is currently 173 at today's visit. His diet consists of Ensure, waffles, and Ribblets from [employer]. He has formed bowel movements once to twice a day without blood in the stool. He recently moved from [facility] and hates the food around here. States his appetite is fine. Denies night sweats, chills, fevers, vision changes, incontinence, hearing changes.     Anxiety/Depression:  Patient has moved to six different

## 2023-10-27 ENCOUNTER — HOSPITAL ENCOUNTER (OUTPATIENT)
Dept: CT IMAGING | Age: 58
Discharge: HOME | End: 2023-10-27
Payer: COMMERCIAL

## 2023-10-27 ENCOUNTER — HOSPITAL ENCOUNTER (OUTPATIENT)
Age: 58
Discharge: HOME | End: 2023-10-27
Payer: COMMERCIAL

## 2023-10-27 ENCOUNTER — HOSPITAL ENCOUNTER (OUTPATIENT)
Dept: GENERAL RADIOLOGY | Age: 58
Discharge: HOME | End: 2023-10-27
Payer: COMMERCIAL

## 2023-10-27 DIAGNOSIS — Z87.891 PERSONAL HISTORY OF TOBACCO USE: ICD-10-CM

## 2023-10-27 DIAGNOSIS — M54.50 CHRONIC BILATERAL LOW BACK PAIN WITHOUT SCIATICA: ICD-10-CM

## 2023-10-27 DIAGNOSIS — G89.29 CHRONIC BILATERAL LOW BACK PAIN WITHOUT SCIATICA: ICD-10-CM

## 2023-10-27 PROCEDURE — 72100 X-RAY EXAM L-S SPINE 2/3 VWS: CPT

## 2023-10-27 PROCEDURE — 71271 CT THORAX LUNG CANCER SCR C-: CPT

## 2023-10-30 NOTE — TELEPHONE ENCOUNTER
I called [facility] pharmacy to find out what the directions are on the sildenafil 100mg since we've never filled medication and it's not on his med list.    Per [facility] pharmacist - they've never filled Sildenafil 100mg before for this patient. I contacted the patient he informed me that he had been moving around a lot and the last person to fill the medication was his old PCP Dr. Grace Swartz  @ [facility] on June 15th. The directions are take 1 tablet daily prior to intercourse. Pt informed me he does take one daily     Dr. Qing Becerra I did pend the medication for you- not sure if you are willing to prescribe?

## 2023-10-30 NOTE — TELEPHONE ENCOUNTER
----- Message from Chris Holguin sent at 10/30/2023 12:19 PM EDT -----  Subject: Refill Request    QUESTIONS  Name of Medication? Other - Sildenafil 100MG  Patient-reported dosage and instructions? 1 tab po prior as needed   How many days do you have left? 0  Preferred Pharmacy? [id number]  Pharmacy phone number (if available)? [phone]  Additional Information for Provider? Pt says this is a medication from   previous Doctor but he would like to get it again with his new PCP  ---------------------------------------------------------------------------  --------------  [address]  What is the best way for the office to contact you? OK to leave message on   voicemail  Preferred Call Back Phone Number? [phone]  ---------------------------------------------------------------------------  --------------  SCRIPT ANSWERS  Relationship to Patient?  Self

## 2023-10-31 ENCOUNTER — TELEPHONE (OUTPATIENT)
Dept: PRIMARY CARE CLINIC | Age: 58
End: 2023-10-31

## 2023-10-31 NOTE — TELEPHONE ENCOUNTER
----- Message from Demian Reynoso DO sent at 10/30/2023  9:15 PM EDT -----  Regarding: Labs  Please call and advise patient to complete labs today, if possible, prior to his visit in office tomorrow with Dr. Heidi Tena. Thank you.

## 2023-10-31 NOTE — TELEPHONE ENCOUNTER
Spoke with patient already they had an emergency come up and will not be in to see you until next week but he knows to go and have his labs done for he comes in.

## 2023-10-31 NOTE — TELEPHONE ENCOUNTER
Call placed to this patient, reached voicemail. Left message for patient to get labs done prior to appointment.

## 2023-11-03 RX ORDER — SILDENAFIL 100 MG/1
TABLET, FILM COATED ORAL
Qty: 30 TABLET | Refills: 1 | OUTPATIENT
Start: 2023-11-03

## 2023-11-03 NOTE — TELEPHONE ENCOUNTER
I have not evaluated the patient for this condition at his last office visit and I was not aware that patient is taking this medication. I will need to discuss this with him at his next office before re-prescribing him. Please call and advise patient this. Thank you.

## 2023-11-13 ENCOUNTER — APPOINTMENT (OUTPATIENT)
Dept: CT IMAGING | Age: 58
DRG: 069 | End: 2023-11-13
Payer: COMMERCIAL

## 2023-11-13 ENCOUNTER — HOSPITAL ENCOUNTER (INPATIENT)
Age: 58
LOS: 2 days | Discharge: HOME | DRG: 069 | End: 2023-11-15
Attending: EMERGENCY MEDICINE | Admitting: STUDENT IN AN ORGANIZED HEALTH CARE EDUCATION/TRAINING PROGRAM
Payer: COMMERCIAL

## 2023-11-13 DIAGNOSIS — F10.929 ACUTE ALCOHOLIC INTOXICATION WITH COMPLICATION (HCC): ICD-10-CM

## 2023-11-13 DIAGNOSIS — R53.1 RIGHT SIDED WEAKNESS: ICD-10-CM

## 2023-11-13 DIAGNOSIS — R41.0 CONFUSION: ICD-10-CM

## 2023-11-13 DIAGNOSIS — R41.0 DISORIENTATION: Primary | ICD-10-CM

## 2023-11-13 PROBLEM — R29.90 STROKE-LIKE SYMPTOMS: Status: ACTIVE | Noted: 2023-11-13

## 2023-11-13 LAB
ALBUMIN SERPL-MCNC: 4.5 G/DL (ref 3.4–5)
ALBUMIN/GLOB SERPL: 1.3 {RATIO} (ref 1.1–2.2)
ALP SERPL-CCNC: 93 U/L (ref 40–129)
ALT SERPL-CCNC: 77 U/L (ref 10–40)
AMMONIA PLAS-SCNC: 23 UMOL/L (ref 16–60)
ANION GAP SERPL CALCULATED.3IONS-SCNC: 16 MMOL/L (ref 3–16)
AST SERPL-CCNC: 177 U/L (ref 15–37)
BACTERIA URNS QL MICRO: ABNORMAL /HPF
BASOPHILS # BLD: 0.1 K/UL (ref 0–0.2)
BASOPHILS NFR BLD: 2.4 %
BILIRUB SERPL-MCNC: 1.2 MG/DL (ref 0–1)
BILIRUB UR QL STRIP.AUTO: NEGATIVE
BUN SERPL-MCNC: 7 MG/DL (ref 7–20)
CALCIUM SERPL-MCNC: 9.1 MG/DL (ref 8.3–10.6)
CHLORIDE SERPL-SCNC: 99 MMOL/L (ref 99–110)
CLARITY UR: ABNORMAL
CO2 SERPL-SCNC: 25 MMOL/L (ref 21–32)
COLOR UR: YELLOW
CREAT SERPL-MCNC: 0.7 MG/DL (ref 0.9–1.3)
DEPRECATED RDW RBC AUTO: 13.2 % (ref 12.4–15.4)
EOSINOPHIL # BLD: 0.1 K/UL (ref 0–0.6)
EOSINOPHIL NFR BLD: 1.2 %
ETHANOLAMINE SERPL-MCNC: 210 MG/DL (ref 0–0.08)
GFR SERPLBLD CREATININE-BSD FMLA CKD-EPI: >60 ML/MIN/{1.73_M2}
GLUCOSE SERPL-MCNC: 178 MG/DL (ref 70–99)
GLUCOSE UR STRIP.AUTO-MCNC: NEGATIVE MG/DL
HCT VFR BLD AUTO: 49.7 % (ref 40.5–52.5)
HGB BLD-MCNC: 16.9 G/DL (ref 13.5–17.5)
HGB UR QL STRIP.AUTO: ABNORMAL
INR PPP: 0.99 (ref 0.84–1.16)
KETONES UR STRIP.AUTO-MCNC: NEGATIVE MG/DL
LACTATE BLDV-SCNC: 2.9 MMOL/L (ref 0.4–2)
LEUKOCYTE ESTERASE UR QL STRIP.AUTO: NEGATIVE
LYMPHOCYTES # BLD: 2.7 K/UL (ref 1–5.1)
LYMPHOCYTES NFR BLD: 62.3 %
MAGNESIUM SERPL-MCNC: 1.7 MG/DL (ref 1.8–2.4)
MCH RBC QN AUTO: 34.8 PG (ref 26–34)
MCHC RBC AUTO-ENTMCNC: 34 G/DL (ref 31–36)
MCV RBC AUTO: 102.2 FL (ref 80–100)
MONOCYTES # BLD: 0.5 K/UL (ref 0–1.3)
MONOCYTES NFR BLD: 10.4 %
MUCOUS THREADS #/AREA URNS LPF: ABNORMAL /LPF
NEUTROPHILS # BLD: 1 K/UL (ref 1.7–7.7)
NEUTROPHILS NFR BLD: 23.7 %
NITRITE UR QL STRIP.AUTO: NEGATIVE
PH UR STRIP.AUTO: 6 [PH] (ref 5–8)
PLATELET # BLD AUTO: 86 K/UL (ref 135–450)
PLATELET BLD QL SMEAR: ABNORMAL
PMV BLD AUTO: 8 FL (ref 5–10.5)
POTASSIUM SERPL-SCNC: 3.5 MMOL/L (ref 3.5–5.1)
PROT SERPL-MCNC: 8.1 G/DL (ref 6.4–8.2)
PROT UR STRIP.AUTO-MCNC: ABNORMAL MG/DL
PROTHROMBIN TIME: 13.1 SEC (ref 11.5–14.8)
RBC # BLD AUTO: 4.86 M/UL (ref 4.2–5.9)
RBC #/AREA URNS HPF: >100 /HPF (ref 0–4)
SLIDE REVIEW: ABNORMAL
SODIUM SERPL-SCNC: 140 MMOL/L (ref 136–145)
SP GR UR STRIP.AUTO: 1.01 (ref 1–1.03)
TROPONIN, HIGH SENSITIVITY: 10 NG/L (ref 0–22)
TROPONIN, HIGH SENSITIVITY: 9 NG/L (ref 0–22)
TROPONIN, HIGH SENSITIVITY: 9 NG/L (ref 0–22)
UA COMPLETE W REFLEX CULTURE PNL UR: ABNORMAL
UA DIPSTICK W REFLEX MICRO PNL UR: YES
URN SPEC COLLECT METH UR: ABNORMAL
UROBILINOGEN UR STRIP-ACNC: 1 E.U./DL
WBC # BLD AUTO: 4.3 K/UL (ref 4–11)
WBC #/AREA URNS HPF: ABNORMAL /HPF (ref 0–5)

## 2023-11-13 PROCEDURE — 83605 ASSAY OF LACTIC ACID: CPT

## 2023-11-13 PROCEDURE — 81001 URINALYSIS AUTO W/SCOPE: CPT

## 2023-11-13 PROCEDURE — 2500000003 HC RX 250 WO HCPCS: Performed by: PHYSICIAN ASSISTANT

## 2023-11-13 PROCEDURE — 6370000000 HC RX 637 (ALT 250 FOR IP): Performed by: STUDENT IN AN ORGANIZED HEALTH CARE EDUCATION/TRAINING PROGRAM

## 2023-11-13 PROCEDURE — 70498 CT ANGIOGRAPHY NECK: CPT

## 2023-11-13 PROCEDURE — 6360000002 HC RX W HCPCS: Performed by: STUDENT IN AN ORGANIZED HEALTH CARE EDUCATION/TRAINING PROGRAM

## 2023-11-13 PROCEDURE — 2580000003 HC RX 258: Performed by: STUDENT IN AN ORGANIZED HEALTH CARE EDUCATION/TRAINING PROGRAM

## 2023-11-13 PROCEDURE — 82140 ASSAY OF AMMONIA: CPT

## 2023-11-13 PROCEDURE — 70450 CT HEAD/BRAIN W/O DYE: CPT

## 2023-11-13 PROCEDURE — 83735 ASSAY OF MAGNESIUM: CPT

## 2023-11-13 PROCEDURE — 84484 ASSAY OF TROPONIN QUANT: CPT

## 2023-11-13 PROCEDURE — 1200000000 HC SEMI PRIVATE

## 2023-11-13 PROCEDURE — 96374 THER/PROPH/DIAG INJ IV PUSH: CPT

## 2023-11-13 PROCEDURE — 6360000004 HC RX CONTRAST MEDICATION: Performed by: PHYSICIAN ASSISTANT

## 2023-11-13 PROCEDURE — 2580000003 HC RX 258: Performed by: PHYSICIAN ASSISTANT

## 2023-11-13 PROCEDURE — 93005 ELECTROCARDIOGRAM TRACING: CPT | Performed by: PHYSICIAN ASSISTANT

## 2023-11-13 PROCEDURE — 80053 COMPREHEN METABOLIC PANEL: CPT

## 2023-11-13 PROCEDURE — 85025 COMPLETE CBC W/AUTO DIFF WBC: CPT

## 2023-11-13 PROCEDURE — 71275 CT ANGIOGRAPHY CHEST: CPT

## 2023-11-13 PROCEDURE — 82077 ASSAY SPEC XCP UR&BREATH IA: CPT

## 2023-11-13 PROCEDURE — 36415 COLL VENOUS BLD VENIPUNCTURE: CPT

## 2023-11-13 PROCEDURE — 6360000002 HC RX W HCPCS: Performed by: PHYSICIAN ASSISTANT

## 2023-11-13 PROCEDURE — 85610 PROTHROMBIN TIME: CPT

## 2023-11-13 PROCEDURE — 99285 EMERGENCY DEPT VISIT HI MDM: CPT

## 2023-11-13 PROCEDURE — 6370000000 HC RX 637 (ALT 250 FOR IP): Performed by: PHYSICIAN ASSISTANT

## 2023-11-13 RX ORDER — ASPIRIN 81 MG/1
81 TABLET, CHEWABLE ORAL DAILY
Status: DISCONTINUED | OUTPATIENT
Start: 2023-11-14 | End: 2023-11-15 | Stop reason: HOSPADM

## 2023-11-13 RX ORDER — M-VIT,TX,IRON,MINS/CALC/FOLIC 27MG-0.4MG
1 TABLET ORAL DAILY
Status: DISCONTINUED | OUTPATIENT
Start: 2023-11-13 | End: 2023-11-15 | Stop reason: HOSPADM

## 2023-11-13 RX ORDER — SODIUM CHLORIDE, SODIUM LACTATE, POTASSIUM CHLORIDE, CALCIUM CHLORIDE 600; 310; 30; 20 MG/100ML; MG/100ML; MG/100ML; MG/100ML
INJECTION, SOLUTION INTRAVENOUS CONTINUOUS
Status: DISCONTINUED | OUTPATIENT
Start: 2023-11-13 | End: 2023-11-14

## 2023-11-13 RX ORDER — LANOLIN ALCOHOL/MO/W.PET/CERES
100 CREAM (GRAM) TOPICAL DAILY
Status: DISCONTINUED | OUTPATIENT
Start: 2023-11-13 | End: 2023-11-15 | Stop reason: HOSPADM

## 2023-11-13 RX ORDER — LORAZEPAM 2 MG/ML
4 INJECTION INTRAMUSCULAR
Status: DISCONTINUED | OUTPATIENT
Start: 2023-11-13 | End: 2023-11-15 | Stop reason: HOSPADM

## 2023-11-13 RX ORDER — ONDANSETRON 2 MG/ML
4 INJECTION INTRAMUSCULAR; INTRAVENOUS EVERY 6 HOURS PRN
Status: DISCONTINUED | OUTPATIENT
Start: 2023-11-13 | End: 2023-11-15 | Stop reason: HOSPADM

## 2023-11-13 RX ORDER — ENOXAPARIN SODIUM 100 MG/ML
40 INJECTION SUBCUTANEOUS DAILY
Status: DISCONTINUED | OUTPATIENT
Start: 2023-11-13 | End: 2023-11-15

## 2023-11-13 RX ORDER — SODIUM CHLORIDE 0.9 % (FLUSH) 0.9 %
5-40 SYRINGE (ML) INJECTION PRN
Status: DISCONTINUED | OUTPATIENT
Start: 2023-11-13 | End: 2023-11-15 | Stop reason: HOSPADM

## 2023-11-13 RX ORDER — ASPIRIN 325 MG
325 TABLET ORAL ONCE
Status: COMPLETED | OUTPATIENT
Start: 2023-11-13 | End: 2023-11-13

## 2023-11-13 RX ORDER — LORAZEPAM 1 MG/1
2 TABLET ORAL
Status: DISCONTINUED | OUTPATIENT
Start: 2023-11-13 | End: 2023-11-15 | Stop reason: HOSPADM

## 2023-11-13 RX ORDER — ACETAMINOPHEN 325 MG/1
650 TABLET ORAL EVERY 4 HOURS PRN
Status: DISCONTINUED | OUTPATIENT
Start: 2023-11-13 | End: 2023-11-15 | Stop reason: HOSPADM

## 2023-11-13 RX ORDER — ROSUVASTATIN CALCIUM 10 MG/1
40 TABLET, COATED ORAL NIGHTLY
Status: DISCONTINUED | OUTPATIENT
Start: 2023-11-13 | End: 2023-11-15 | Stop reason: HOSPADM

## 2023-11-13 RX ORDER — SODIUM CHLORIDE 9 MG/ML
INJECTION, SOLUTION INTRAVENOUS PRN
Status: DISCONTINUED | OUTPATIENT
Start: 2023-11-13 | End: 2023-11-13 | Stop reason: SDUPTHER

## 2023-11-13 RX ORDER — ONDANSETRON 4 MG/1
4 TABLET, ORALLY DISINTEGRATING ORAL EVERY 8 HOURS PRN
Status: DISCONTINUED | OUTPATIENT
Start: 2023-11-13 | End: 2023-11-15 | Stop reason: HOSPADM

## 2023-11-13 RX ORDER — LORAZEPAM 2 MG/ML
3 INJECTION INTRAMUSCULAR
Status: DISCONTINUED | OUTPATIENT
Start: 2023-11-13 | End: 2023-11-15 | Stop reason: HOSPADM

## 2023-11-13 RX ORDER — SODIUM CHLORIDE 0.9 % (FLUSH) 0.9 %
5-40 SYRINGE (ML) INJECTION EVERY 12 HOURS SCHEDULED
Status: DISCONTINUED | OUTPATIENT
Start: 2023-11-13 | End: 2023-11-15 | Stop reason: HOSPADM

## 2023-11-13 RX ORDER — POLYETHYLENE GLYCOL 3350 17 G/17G
17 POWDER, FOR SOLUTION ORAL DAILY PRN
Status: DISCONTINUED | OUTPATIENT
Start: 2023-11-13 | End: 2023-11-15 | Stop reason: HOSPADM

## 2023-11-13 RX ORDER — ASPIRIN 300 MG/1
300 SUPPOSITORY RECTAL DAILY
Status: DISCONTINUED | OUTPATIENT
Start: 2023-11-14 | End: 2023-11-15 | Stop reason: HOSPADM

## 2023-11-13 RX ORDER — LABETALOL HYDROCHLORIDE 5 MG/ML
10 INJECTION, SOLUTION INTRAVENOUS EVERY 10 MIN PRN
Status: DISCONTINUED | OUTPATIENT
Start: 2023-11-13 | End: 2023-11-15 | Stop reason: HOSPADM

## 2023-11-13 RX ORDER — LORAZEPAM 1 MG/1
4 TABLET ORAL
Status: DISCONTINUED | OUTPATIENT
Start: 2023-11-13 | End: 2023-11-15 | Stop reason: HOSPADM

## 2023-11-13 RX ORDER — SODIUM CHLORIDE 9 MG/ML
INJECTION, SOLUTION INTRAVENOUS PRN
Status: DISCONTINUED | OUTPATIENT
Start: 2023-11-13 | End: 2023-11-15 | Stop reason: HOSPADM

## 2023-11-13 RX ORDER — ACETAMINOPHEN 650 MG/1
650 SUPPOSITORY RECTAL EVERY 4 HOURS PRN
Status: DISCONTINUED | OUTPATIENT
Start: 2023-11-13 | End: 2023-11-15 | Stop reason: HOSPADM

## 2023-11-13 RX ORDER — LORAZEPAM 2 MG/ML
1 INJECTION INTRAMUSCULAR
Status: DISCONTINUED | OUTPATIENT
Start: 2023-11-13 | End: 2023-11-15 | Stop reason: HOSPADM

## 2023-11-13 RX ORDER — LORAZEPAM 1 MG/1
3 TABLET ORAL
Status: DISCONTINUED | OUTPATIENT
Start: 2023-11-13 | End: 2023-11-15 | Stop reason: HOSPADM

## 2023-11-13 RX ORDER — LORAZEPAM 1 MG/1
1 TABLET ORAL
Status: DISCONTINUED | OUTPATIENT
Start: 2023-11-13 | End: 2023-11-15 | Stop reason: HOSPADM

## 2023-11-13 RX ORDER — LORAZEPAM 2 MG/ML
2 INJECTION INTRAMUSCULAR
Status: DISCONTINUED | OUTPATIENT
Start: 2023-11-13 | End: 2023-11-15 | Stop reason: HOSPADM

## 2023-11-13 RX ADMIN — LORAZEPAM 1 MG: 2 INJECTION INTRAMUSCULAR; INTRAVENOUS at 21:16

## 2023-11-13 RX ADMIN — ENOXAPARIN SODIUM 40 MG: 100 INJECTION SUBCUTANEOUS at 19:48

## 2023-11-13 RX ADMIN — THIAMINE HYDROCHLORIDE: 100 INJECTION, SOLUTION INTRAMUSCULAR; INTRAVENOUS at 15:00

## 2023-11-13 RX ADMIN — ASPIRIN 325 MG: 325 TABLET ORAL at 15:02

## 2023-11-13 RX ADMIN — Medication 10 ML: at 19:50

## 2023-11-13 RX ADMIN — ONDANSETRON 4 MG: 2 INJECTION INTRAMUSCULAR; INTRAVENOUS at 19:48

## 2023-11-13 RX ADMIN — MULTIPLE VITAMINS W/ MINERALS TAB 1 TABLET: TAB at 19:48

## 2023-11-13 RX ADMIN — Medication 100 MG: at 19:49

## 2023-11-13 RX ADMIN — ACETAMINOPHEN 650 MG: 325 TABLET ORAL at 21:21

## 2023-11-13 RX ADMIN — IOPAMIDOL 75 ML: 755 INJECTION, SOLUTION INTRAVENOUS at 13:07

## 2023-11-13 RX ADMIN — IOPAMIDOL 75 ML: 755 INJECTION, SOLUTION INTRAVENOUS at 13:11

## 2023-11-13 RX ADMIN — ROSUVASTATIN 40 MG: 10 TABLET, FILM COATED ORAL at 19:48

## 2023-11-13 ASSESSMENT — PAIN DESCRIPTION - DESCRIPTORS: DESCRIPTORS: ACHING;DISCOMFORT

## 2023-11-13 ASSESSMENT — PAIN - FUNCTIONAL ASSESSMENT: PAIN_FUNCTIONAL_ASSESSMENT: PREVENTS OR INTERFERES SOME ACTIVE ACTIVITIES AND ADLS

## 2023-11-13 ASSESSMENT — PAIN SCALES - GENERAL: PAINLEVEL_OUTOF10: 9

## 2023-11-13 ASSESSMENT — PAIN DESCRIPTION - ONSET: ONSET: ON-GOING

## 2023-11-13 ASSESSMENT — PAIN DESCRIPTION - ORIENTATION: ORIENTATION: LOWER

## 2023-11-13 ASSESSMENT — PAIN DESCRIPTION - FREQUENCY: FREQUENCY: CONTINUOUS

## 2023-11-13 ASSESSMENT — PAIN DESCRIPTION - LOCATION: LOCATION: BACK

## 2023-11-13 ASSESSMENT — PAIN DESCRIPTION - PAIN TYPE: TYPE: CHRONIC PAIN

## 2023-11-13 NOTE — ED NOTES
@1031 Shahana HUFF called a code stroke   @3723 CT was called room 1   @1255  Stroke team called   @1753 Lab called   @1300  Stroke team called back and spoke with Danni Jose  11/13/23 3070

## 2023-11-13 NOTE — ED NOTES
Repeat troponin drawn and sent. Patient is now alert and oriented to self, time and place.      Tony Downing RN  11/13/23 5024

## 2023-11-14 ENCOUNTER — APPOINTMENT (OUTPATIENT)
Dept: MRI IMAGING | Age: 58
DRG: 069 | End: 2023-11-14
Payer: COMMERCIAL

## 2023-11-14 LAB
CHOLEST SERPL-MCNC: 180 MG/DL (ref 0–199)
DEPRECATED RDW RBC AUTO: 12.9 % (ref 12.4–15.4)
EKG ATRIAL RATE: 84 BPM
EKG DIAGNOSIS: NORMAL
EKG P AXIS: 46 DEGREES
EKG P-R INTERVAL: 184 MS
EKG Q-T INTERVAL: 364 MS
EKG QRS DURATION: 70 MS
EKG QTC CALCULATION (BAZETT): 430 MS
EKG R AXIS: 59 DEGREES
EKG T AXIS: 56 DEGREES
EKG VENTRICULAR RATE: 84 BPM
EST. AVERAGE GLUCOSE BLD GHB EST-MCNC: 111.2 MG/DL
HBA1C MFR BLD: 5.5 %
HCT VFR BLD AUTO: 39.9 % (ref 40.5–52.5)
HDLC SERPL-MCNC: 86 MG/DL (ref 40–60)
HGB BLD-MCNC: 13.7 G/DL (ref 13.5–17.5)
LDLC SERPL CALC-MCNC: 79 MG/DL
MCH RBC QN AUTO: 34.8 PG (ref 26–34)
MCHC RBC AUTO-ENTMCNC: 34.4 G/DL (ref 31–36)
MCV RBC AUTO: 101.2 FL (ref 80–100)
PLATELET # BLD AUTO: 60 K/UL (ref 135–450)
PMV BLD AUTO: 8.9 FL (ref 5–10.5)
RBC # BLD AUTO: 3.94 M/UL (ref 4.2–5.9)
T4 FREE SERPL-MCNC: 1.1 NG/DL (ref 0.9–1.8)
TRIGL SERPL-MCNC: 76 MG/DL (ref 0–150)
TSH SERPL DL<=0.005 MIU/L-ACNC: 5.21 UIU/ML (ref 0.27–4.2)
VLDLC SERPL CALC-MCNC: 15 MG/DL
WBC # BLD AUTO: 4.6 K/UL (ref 4–11)

## 2023-11-14 PROCEDURE — 6370000000 HC RX 637 (ALT 250 FOR IP): Performed by: INTERNAL MEDICINE

## 2023-11-14 PROCEDURE — 1200000000 HC SEMI PRIVATE

## 2023-11-14 PROCEDURE — 97530 THERAPEUTIC ACTIVITIES: CPT

## 2023-11-14 PROCEDURE — 36415 COLL VENOUS BLD VENIPUNCTURE: CPT

## 2023-11-14 PROCEDURE — 97162 PT EVAL MOD COMPLEX 30 MIN: CPT

## 2023-11-14 PROCEDURE — 93010 ELECTROCARDIOGRAM REPORT: CPT | Performed by: INTERNAL MEDICINE

## 2023-11-14 PROCEDURE — 83036 HEMOGLOBIN GLYCOSYLATED A1C: CPT

## 2023-11-14 PROCEDURE — 85027 COMPLETE CBC AUTOMATED: CPT

## 2023-11-14 PROCEDURE — 80061 LIPID PANEL: CPT

## 2023-11-14 PROCEDURE — 70551 MRI BRAIN STEM W/O DYE: CPT

## 2023-11-14 PROCEDURE — 84439 ASSAY OF FREE THYROXINE: CPT

## 2023-11-14 PROCEDURE — 84443 ASSAY THYROID STIM HORMONE: CPT

## 2023-11-14 PROCEDURE — 97535 SELF CARE MNGMENT TRAINING: CPT

## 2023-11-14 PROCEDURE — 6360000002 HC RX W HCPCS: Performed by: STUDENT IN AN ORGANIZED HEALTH CARE EDUCATION/TRAINING PROGRAM

## 2023-11-14 PROCEDURE — 97166 OT EVAL MOD COMPLEX 45 MIN: CPT

## 2023-11-14 PROCEDURE — 2580000003 HC RX 258: Performed by: STUDENT IN AN ORGANIZED HEALTH CARE EDUCATION/TRAINING PROGRAM

## 2023-11-14 PROCEDURE — 97116 GAIT TRAINING THERAPY: CPT

## 2023-11-14 PROCEDURE — 6370000000 HC RX 637 (ALT 250 FOR IP): Performed by: STUDENT IN AN ORGANIZED HEALTH CARE EDUCATION/TRAINING PROGRAM

## 2023-11-14 RX ORDER — LISINOPRIL 5 MG/1
5 TABLET ORAL DAILY
Status: DISCONTINUED | OUTPATIENT
Start: 2023-11-14 | End: 2023-11-15

## 2023-11-14 RX ADMIN — ASPIRIN 81 MG: 81 TABLET, CHEWABLE ORAL at 09:03

## 2023-11-14 RX ADMIN — Medication 100 MG: at 09:03

## 2023-11-14 RX ADMIN — SODIUM CHLORIDE, POTASSIUM CHLORIDE, SODIUM LACTATE AND CALCIUM CHLORIDE: 600; 310; 30; 20 INJECTION, SOLUTION INTRAVENOUS at 01:48

## 2023-11-14 RX ADMIN — LISINOPRIL 5 MG: 5 TABLET ORAL at 18:10

## 2023-11-14 RX ADMIN — Medication 10 ML: at 09:04

## 2023-11-14 RX ADMIN — ENOXAPARIN SODIUM 40 MG: 100 INJECTION SUBCUTANEOUS at 09:03

## 2023-11-14 RX ADMIN — MULTIPLE VITAMINS W/ MINERALS TAB 1 TABLET: TAB at 09:03

## 2023-11-14 RX ADMIN — ACETAMINOPHEN 650 MG: 325 TABLET ORAL at 19:44

## 2023-11-14 RX ADMIN — ROSUVASTATIN 40 MG: 10 TABLET, FILM COATED ORAL at 19:44

## 2023-11-14 ASSESSMENT — PAIN DESCRIPTION - ONSET: ONSET: ON-GOING

## 2023-11-14 ASSESSMENT — PAIN - FUNCTIONAL ASSESSMENT: PAIN_FUNCTIONAL_ASSESSMENT: PREVENTS OR INTERFERES SOME ACTIVE ACTIVITIES AND ADLS

## 2023-11-14 ASSESSMENT — PAIN DESCRIPTION - PAIN TYPE: TYPE: CHRONIC PAIN

## 2023-11-14 ASSESSMENT — PAIN DESCRIPTION - FREQUENCY: FREQUENCY: CONTINUOUS

## 2023-11-14 ASSESSMENT — PAIN DESCRIPTION - ORIENTATION: ORIENTATION: LOWER

## 2023-11-14 ASSESSMENT — PAIN SCALES - GENERAL
PAINLEVEL_OUTOF10: 6
PAINLEVEL_OUTOF10: 10

## 2023-11-14 ASSESSMENT — PAIN DESCRIPTION - DESCRIPTORS: DESCRIPTORS: ACHING;DISCOMFORT

## 2023-11-14 ASSESSMENT — PAIN DESCRIPTION - LOCATION: LOCATION: BACK;LEG

## 2023-11-14 NOTE — H&P
V2.0  History and Physical      Name:  Clarissa Cohen /Age/Sex: 1965  (63 y.o. male)   MRN & CSN:  [id number] & [id number] Encounter Date/Time: 2023 10:11 PM EST   Location:   PCP: Myriam Brown, [facility] [address] Day: 1    Assessment and Plan:   Clarissa Cohen is a 63 y.o. male with pmh of chronic pain, hypertension, alcohol use, who presents to the ED with complaints of acute onset confusion and weakness. Hospital Problems             Last Modified POA    * (Principal) Stroke-like symptoms 2023 Yes       Stroke-like symptoms  Hypertension  History of alcohol abuse  Hyperlipidemia  - Patient presenting with acute altered mental status which is now improving, right-sided weakness. Ethanol level 210, CT head, CTA head and neck negative for acute intracranial abnormalities, large vessel occlusion.  - Neurology consulted, follow-up recommendations. Will get MRI brain wo contrast, echo with bubble study, TSH, lipid panel, A1c.  -  start ASA 81mg daily, rosuvastatin, permissive hypertension for the first 24 hours, can restart home antihypertensives tomorrow, as needed IV labetalol for BP greater than 220/120.   - Monitor on telemetry, neurochecks every 4 hours, aspiration and fall precautions, n.p.o. pending swallow eval, PM&R, PT/OT, speech and swallow consulted  - CIWA protocol, prn ativan, social work consult      Disposition:   Current Living situation: Home  Expected Disposition: Home vs IPR  Estimated D/C: 3 days    Diet ADULT ORAL NUTRITION SUPPLEMENT; Breakfast, Lunch, Dinner; Standard High Calorie/High Protein Oral Supplement  ADULT DIET; Regular;  No Added Salt (3-4 gm)   DVT Prophylaxis [x] Lovenox, []  Heparin, [] SCDs, [] Ambulation,  [] Eliquis, [] Xarelto, [] Coumadin   Code Status Full Code         Personally reviewed Lab Studies and Imaging     Discussed management of the case with ED provider, patient, partner     Imaging that was interpreted personally includes [address],

## 2023-11-14 NOTE — CARE COORDINATION
Case Management Assessment  Initial Evaluation    Date/Time of Evaluation: 11/14/2023 3:44 PM  Assessment Completed by: Andres Coughlin RN    If patient is discharged prior to next notation, then this note serves as note for discharge by case management. Patient Name: Nimisha Polk                   YOB: 1965  Diagnosis: Confusion [R41.0]  Disorientation [R41.0]  Right sided weakness [R53.1]  Stroke-like symptoms [S51.03]  Acute alcoholic intoxication with complication [facility]) [I03.882]                   Date / Time: 11/13/2023 12:34 PM    Patient Admission Status: Inpatient   Readmission Risk (Low < 19, Mod (19-27), High > 27): Readmission Risk Score: 11.7    Current PCP: Lacy Neville, DO  PCP verified by CM? Yes    Chart Reviewed: Yes      History Provided by: Patient  Patient Orientation: Alert and Oriented, Person, Place, Situation, Self    Patient Cognition: Alert    Hospitalization in the last 30 days (Readmission):  No    If yes, Readmission Assessment in CM Navigator will be completed. Advance Directives:      Code Status: Full Code   Patient's Primary Decision Maker is: Patient Declined (Legal Next of Kin Remains as Decision Maker) (pt states he just had them done, SO will bring them in)    Primary Decision Maker: Amy Scruggs Brother/Sister - [phone]    Discharge Planning:    Patient lives with: Spouse/Significant Other Type of Home: Apartment  Primary Care Giver: Self  Patient Support Systems include: Spouse/Significant Other   Current Financial resources: None  Current community resources: None  Current services prior to admission: None            Current DME:              Type of Home Care services:  None    ADLS  Prior functional level: Independent in ADLs/IADLs  Current functional level: Assistance with the following:, Mobility, Cooking, Housework, Shopping    PT AM-PAC: 20 /24  OT AM-PAC: 19 /24    Family can provide assistance at DC:  Other (comment) (unknown.)  Would

## 2023-11-14 NOTE — PLAN OF CARE
Problem: Discharge Planning  Goal: Discharge to home or other facility with appropriate resources  Outcome: Progressing     Problem: Skin/Tissue Integrity  Goal: Absence of new skin breakdown  Description: 1. Monitor for areas of redness and/or skin breakdown  2. Assess vascular access sites hourly  3. Every 4-6 hours minimum:  Change oxygen saturation probe site  4. Every 4-6 hours:  If on nasal continuous positive airway pressure, respiratory therapy assess nares and determine need for appliance change or resting period.   Outcome: Progressing     Problem: Safety - Adult  Goal: Free from fall injury  Outcome: Progressing     Problem: ABCDS Injury Assessment  Goal: Absence of physical injury  Outcome: Progressing     Problem: Pain  Goal: Verbalizes/displays adequate comfort level or baseline comfort level  Outcome: Progressing     Problem: Neurosensory - Adult  Goal: Achieves stable or improved neurological status  Outcome: Progressing  Goal: Absence of seizures  Outcome: Progressing  Goal: Remains free of injury related to seizures activity  Outcome: Progressing  Goal: Achieves maximal functionality and self care  Outcome: Progressing     Problem: Musculoskeletal - Adult  Goal: Return mobility to safest level of function  Outcome: Progressing  Goal: Maintain proper alignment of affected body part  Outcome: Progressing  Goal: Return ADL status to a safe level of function  Outcome: Progressing

## 2023-11-15 VITALS
BODY MASS INDEX: 22.2 KG/M2 | HEART RATE: 105 BPM | SYSTOLIC BLOOD PRESSURE: 149 MMHG | HEIGHT: 74 IN | WEIGHT: 173 LBS | RESPIRATION RATE: 18 BRPM | OXYGEN SATURATION: 96 % | TEMPERATURE: 98.4 F | DIASTOLIC BLOOD PRESSURE: 83 MMHG

## 2023-11-15 DIAGNOSIS — F32.A DEPRESSION, UNSPECIFIED DEPRESSION TYPE: ICD-10-CM

## 2023-11-15 DIAGNOSIS — F41.9 ANXIETY: ICD-10-CM

## 2023-11-15 PROBLEM — R41.0 DISORIENTATION: Status: ACTIVE | Noted: 2023-11-15

## 2023-11-15 PROCEDURE — 6370000000 HC RX 637 (ALT 250 FOR IP): Performed by: INTERNAL MEDICINE

## 2023-11-15 PROCEDURE — APPSS60 APP SPLIT SHARED TIME 46-60 MINUTES: Performed by: NURSE PRACTITIONER

## 2023-11-15 PROCEDURE — 2580000003 HC RX 258: Performed by: STUDENT IN AN ORGANIZED HEALTH CARE EDUCATION/TRAINING PROGRAM

## 2023-11-15 PROCEDURE — 6370000000 HC RX 637 (ALT 250 FOR IP): Performed by: STUDENT IN AN ORGANIZED HEALTH CARE EDUCATION/TRAINING PROGRAM

## 2023-11-15 PROCEDURE — 99223 1ST HOSP IP/OBS HIGH 75: CPT | Performed by: PSYCHIATRY & NEUROLOGY

## 2023-11-15 PROCEDURE — 97535 SELF CARE MNGMENT TRAINING: CPT

## 2023-11-15 RX ORDER — LANOLIN ALCOHOL/MO/W.PET/CERES
100 CREAM (GRAM) TOPICAL DAILY
Qty: 30 TABLET | Refills: 3 | Status: SHIPPED | OUTPATIENT
Start: 2023-11-16

## 2023-11-15 RX ORDER — ESCITALOPRAM OXALATE 5 MG/1
5 TABLET ORAL DAILY
Qty: 30 TABLET | Refills: 0 | Status: SHIPPED | OUTPATIENT
Start: 2023-11-15 | End: 2023-11-16 | Stop reason: SDUPTHER

## 2023-11-15 RX ORDER — ESCITALOPRAM OXALATE 10 MG/1
5 TABLET ORAL DAILY
Status: DISCONTINUED | OUTPATIENT
Start: 2023-11-15 | End: 2023-11-15 | Stop reason: HOSPADM

## 2023-11-15 RX ORDER — ROSUVASTATIN CALCIUM 40 MG/1
40 TABLET, COATED ORAL NIGHTLY
Qty: 30 TABLET | Refills: 3 | Status: SHIPPED | OUTPATIENT
Start: 2023-11-15

## 2023-11-15 RX ORDER — LISINOPRIL 10 MG/1
10 TABLET ORAL DAILY
Qty: 30 TABLET | Refills: 3 | Status: SHIPPED | OUTPATIENT
Start: 2023-11-16

## 2023-11-15 RX ORDER — LISINOPRIL 10 MG/1
10 TABLET ORAL DAILY
Status: DISCONTINUED | OUTPATIENT
Start: 2023-11-16 | End: 2023-11-15 | Stop reason: HOSPADM

## 2023-11-15 RX ORDER — M-VIT,TX,IRON,MINS/CALC/FOLIC 27MG-0.4MG
1 TABLET ORAL DAILY
Qty: 60 TABLET | Refills: 1 | Status: SHIPPED | OUTPATIENT
Start: 2023-11-16

## 2023-11-15 RX ORDER — LISINOPRIL 5 MG/1
5 TABLET ORAL ONCE
Status: COMPLETED | OUTPATIENT
Start: 2023-11-15 | End: 2023-11-15

## 2023-11-15 RX ORDER — ASPIRIN 81 MG/1
81 TABLET, CHEWABLE ORAL DAILY
Qty: 30 TABLET | Refills: 3 | Status: SHIPPED | OUTPATIENT
Start: 2023-11-16

## 2023-11-15 RX ADMIN — ASPIRIN 81 MG: 81 TABLET, CHEWABLE ORAL at 08:51

## 2023-11-15 RX ADMIN — ESCITALOPRAM OXALATE 5 MG: 10 TABLET ORAL at 08:51

## 2023-11-15 RX ADMIN — LISINOPRIL 5 MG: 5 TABLET ORAL at 08:18

## 2023-11-15 RX ADMIN — Medication 100 MG: at 08:18

## 2023-11-15 RX ADMIN — MULTIPLE VITAMINS W/ MINERALS TAB 1 TABLET: TAB at 08:18

## 2023-11-15 RX ADMIN — LORAZEPAM 2 MG: 1 TABLET ORAL at 12:20

## 2023-11-15 RX ADMIN — LISINOPRIL 5 MG: 5 TABLET ORAL at 13:39

## 2023-11-15 RX ADMIN — Medication 10 ML: at 08:18

## 2023-11-15 NOTE — DISCHARGE SUMMARY
of the mA/kV was utilized to reduce the radiation dose to as low as reasonably achievable. COMPARISON: None. HISTORY: ORDERING SYSTEM PROVIDED HISTORY: Sudden onset of AMS TECHNOLOGIST PROVIDED HISTORY: Reason for exam:->Sudden onset of AMS Has a \"code stroke\" or \"stroke alert\" been called? ->No Decision Support Exception - unselect if not a suspected or confirmed emergency medical condition->Emergency Medical Condition (MA) Reason for Exam: unresponsive FINDINGS: BRAIN/VENTRICLES: There is no acute intracranial hemorrhage, mass effect or midline shift. No abnormal extra-axial fluid collection. The gray-white differentiation is maintained without evidence of an acute infarct. There is no evidence of hydrocephalus. ORBITS: The visualized portion of the orbits demonstrate no acute abnormality. SINUSES: The visualized paranasal sinuses and mastoid air cells demonstrate no acute abnormality. SOFT TISSUES/SKULL:  No acute abnormality of the visualized skull or soft tissues. No acute intracranial abnormality. CBC:   Recent Labs     11/13/23  1250 11/14/23  0616   WBC 4.3 4.6   HGB 16.9 13.7   PLT 86* 60*     BMP:    Recent Labs     11/13/23  1250      K 3.5   CL 99   CO2 25   BUN 7   CREATININE 0.7*   GLUCOSE 178*     Hepatic:   Recent Labs     11/13/23  1250   *   ALT 77*   BILITOT 1.2*   ALKPHOS 93     Lipids:   Lab Results   Component Value Date/Time    CHOL 180 11/14/2023 06:16 AM    HDL 86 11/14/2023 06:16 AM    TRIG 76 11/14/2023 06:16 AM     Hemoglobin A1C:   Lab Results   Component Value Date/Time    LABA1C 5.5 11/14/2023 06:16 AM     TSH: No results found for: \"TSH\"  Troponin: No results found for: \"TROPONINT\"  Lactic Acid:   Recent Labs     11/13/23  1250   LACTA 2.9*     BNP: No results for input(s): \"PROBNP\" in the last 72 hours.   UA:  Lab Results   Component Value Date/Time    NITRU Negative 11/13/2023 04:57 PM    COLORU Yellow 11/13/2023 04:57 PM    PHUR 6.0 11/13/2023 04:57 PM

## 2023-11-15 NOTE — CARE COORDINATION
Referral placed to [facility], awaiting response. Denials at this time include: [address], [place], [facility]. Will follow.  Electronically signed by Trinity Kennedy RN on 11/15/2023 at 2:41 PM

## 2023-11-15 NOTE — PROGRESS NOTES
Comprehensive Nutrition Assessment    Type and Reason for Visit:  Initial, Positive Nutrition Screen    Nutrition Recommendations/Plan:   Continue MONICA diet and encourage PO intake   Modify ensure to BID and add magic cups w/ lunch- pt likes chocolate   RD to order updated weight  Monitor nutrition adequacy, pertinent labs, bowel habits, wt changes, and clinical progress     Malnutrition Assessment:  Malnutrition Status: At risk for malnutrition (Comment) (11/14/23 1140)    Context:  Acute Illness     Findings of the 6 clinical characteristics of malnutrition:  Energy Intake:  Mild decrease in energy intake (Comment)    Nutrition Assessment:    Positive nutrition screen for wt loss and poor appetite: 63 y.o. m w/ pmh of chronic pain, HTN, alcohol use admitted w/ acute onset confusion and weakness. MRI ordered. On MONICA diet. Pt reports decreased appetite and 60 lb wt loss over the past 1.5 years d/t stress. Reports drinking alcohol frequently PTA, which contributed to poor intakes. Fair acceptance of meals here. No wt hx to review in EMR, RD to order updated weight. Pt reports drinking protein drinks PTA, ensure added w/ meals. Willing to trial magic cups, RD to add. Encouraged a variety of menu options, pt compliant to try. Continue to encourage PO intake, will continue to monitor. Nutrition Related Findings:    No labs to review. No BM documented. No nausea. Wound Type: None       Current Nutrition Intake & Therapies:    Average Meal Intake: Unable to assess  Average Supplements Intake: None Ordered  ADULT DIET; Regular; No Added Salt (3-4 gm)  ADULT ORAL NUTRITION SUPPLEMENT; Breakfast, Dinner; Standard High Calorie/High Protein Oral Supplement  ADULT ORAL NUTRITION SUPPLEMENT; Lunch; Frozen Oral Supplement    Anthropometric Measures:  Height: 188 cm (6' 2.02\")  Ideal Body Weight (IBW): 190 lbs (86 kg)       Current Body Weight: 78.5 kg (173 lb), 91.1 % IBW.  Weight Source: Not Specified  Current BMI (kg/m2):
Occupational Therapy  Facility/Department: [place] [id number] - MED SURG/ORTHO  Occupational Therapy Initial Assessment    Name: Nathaly Naylor  : 1965  MRN: [id number]  Date of Service: 2023    Discharge Recommendations:  24 hour supervision or assist, Home independently, S Level 1  OT Equipment Recommendations  Equipment Needed: No       Patient Diagnosis(es): The primary encounter diagnosis was Disorientation. Diagnoses of Confusion, Right sided weakness, and Acute alcoholic intoxication with complication [facility]) were also pertinent to this visit. Past Medical History:  has a past medical history of Chronic back pain and Hypertension. Past Surgical History:  has a past surgical history that includes Appendectomy and Tonsillectomy. Assessment   Performance deficits / Impairments: Decreased functional mobility ; Decreased ADL status; Decreased high-level IADLs;Decreased coordination;Decreased endurance;Decreased balance;Decreased safe awareness  Assessment: Pt is a 61yo man admitted d/t stroke like symptoms pt has hx of ETOH abuse. 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 Pt lives at home with fiance however she is not available 24hrs. Pt uses walker at baseline and reports freq falls. Pt functioning below baseline needing CGA this day and cues for safety. Pt would benefit from acute OT and d/c home with 24hr A if available and HHOT  Prognosis: Fair  Decision Making: Medium Complexity  REQUIRES OT FOLLOW-UP: Yes  Activity Tolerance  Activity Tolerance: Patient Tolerated treatment well        Plan   Occupational Therapy Plan  Times Per Week: 3-4x/wk     Restrictions  Restrictions/Precautions  Restrictions/Precautions: Fall Risk, Seizure    Subjective   General  Chart Reviewed: Yes  Patient assessed for rehabilitation services?: Yes  Family / Caregiver Present: No  Referring Practitioner: Gerardo Mattson MD  Diagnosis: Stroke-like symptoms  Subjective  Subjective: Pt pleasant, impulsive, and agreeable.  RN
Occupational Therapy  Facility/Department: [facility] - MED SURG/ORTHO  Daily Treatment Note  NAME: Jaylin Sifuentes  : 1965  MRN: [id number]  Date of Service: 11/15/2023    Discharge Recommendations:  24 hour supervision or assist, Home independently, S Level 1     AM-PAC score  AM-PAC Inpatient Daily Activity Raw Score: 18 (11/15/23 1601)  AM-PAC Inpatient ADL T-Scale Score : 38.66 (11/15/23 160)  ADL Inpatient CMS 0-100% Score: 46.65 (11/15/23 160)  ADL Inpatient CMS G-Code Modifier : CK (11/15/23 160)    If pt is unable to be seen after this session, please let this note serve as discharge summary. Please see case management note for discharge disposition. Thank you. Patient Diagnosis(es): The primary encounter diagnosis was Disorientation. Diagnoses of Confusion, Right sided weakness, and Acute alcoholic intoxication with complication [facility]) were also pertinent to this visit. Assessment    Assessment: Pt received for OT session finishing up shower with wife at side to address current functional deficits that inhibit independence and safety with ADLs and functional mobility. Pt agreeable to session, reporting no pain. During session, pt completed functional mobility w/ RW and CGA, transfers w/ RW and CGA, UB dressing ins tance  w/ modA, and LB dressing in stance w/ modA. Educated pt on various UB and LB exercises to complete in chair at home upon d/c. Pt will continue to benefit from skilled OT while in acute setting to increase functional activity tolerance, safety and independence w/ ADLs, and increase strength. Pt making fair progress towards personalized OT goals. Continued recs for home w/ 24hr SPV. Continue POC.      Activity Tolerance: Patient tolerated treatment well  Discharge Recommendations: 24 hour supervision or assist;Home independently;S Level 1      Plan   Occupational Therapy Plan  Times Per Week: 3-4x/wk  Current Treatment Recommendations: Strengthening;ROM;Balance training;Functional
TODAY'S DATE:  11/14/2023      Current NIHSS 0      Discussed personal risk factors for Stroke/TIA with patient/family, and ways to reduce the risk for a recurrent stroke. Patient's personal risk factors which were identified are:   [x]   Alcohol Abuse: check with your physician before any alcohol consumption. []   Atrial fibrillation: may cause blood clots  []   Drug Abuse: Seek help, talk with your doctor  []   Clotting Disorder  []   Diabetes  []   Family history of stroke or heart disease  [x]   High Blood Pressure/Hypertension: work with your physician  [x]   High cholesterol: monitor cholesterol levels with your physician  []   Overweight/Obesity: work with your physician for your ideal body weight  [x]   Physical Inactivity: get regular exercise as directed by your physician  []   Personal history of previous TIA or stroke  [x]   Poor Diet: decrease salt (sodium) in your diet, follow diet directed by physician  [x]   Smoking: stop smoking      Reviewed the Following Education with Patient and/or Family:   - Signs and Symptoms of Stroke  - Personal risk factors   - How to activate EMS (911)   - Importance of Follow Up Appointments at Discharge   - Importance of Compliance with Medications Prescribed at Discharge  - Available community resources and stroke advocacy groups if needed    Patient and/or family member: verbalized understanding. Stroke Education booklet given to patient/family (or verified, if given already), which reviews above information.  yes         Electronically signed by Jannetta Dakins, RN on 11/14/2023 at 2:49 PM
sodium chloride flush, LORazepam **OR** LORazepam **OR** LORazepam **OR** LORazepam **OR** LORazepam **OR** LORazepam **OR** LORazepam **OR** LORazepam      Intake/Output Summary (Last 24 hours) at 11/14/2023 1653  Last data filed at 11/13/2023 2000  Gross per 24 hour   Intake 250 ml   Output --   Net 250 ml       Labs: Personally reviewed and interpreted for clinical significance. Recent Labs     11/13/23  1250 11/14/23  0616   WBC 4.3 4.6   HGB 16.9 13.7   HCT 49.7 39.9*   PLT 86* 60*     Recent Labs     11/13/23  1250      K 3.5   CL 99   CO2 25   BUN 7   CREATININE 0.7*   CALCIUM 9.1     Recent Labs     11/13/23  1250   *   ALT 77*   BILITOT 1.2*   ALKPHOS 93     Recent Labs     11/13/23  1250   INR 0.99     Recent Labs     11/13/23  1250 11/13/23  1426 11/13/23  1905   TROPHS 9 9 10       Urinalysis:      Lab Results   Component Value Date/Time    NITRU Negative 11/13/2023 04:57 PM    WBCUA 0-2 11/13/2023 04:57 PM    BACTERIA Rare 11/13/2023 04:57 PM    RBCUA >100 11/13/2023 04:57 PM    BLOODU LARGE 11/13/2023 04:57 PM    SPECGRAV 1.010 11/13/2023 04:57 PM    GLUCOSEU Negative 11/13/2023 04:57 PM       Consults:     IP CONSULT TO HOSPITALIST  IP CONSULT TO SOCIAL WORK    I personally reviewed Lab Studies and Imaging and spoke with the  to plan details for eventual discharge.     Cristian Tovar MD
mobility, use of call light  Education Method: Demonstration;Verbal  Barriers to Learning: None  Education Outcome: Verbalized understanding;Continued education needed      Therapy Time   Individual Concurrent Group Co-treatment   Time In 0810         Time Out 0843         Minutes 33         Timed Code Treatment Minutes: 250 [facility], [address] [place] [address]

## 2023-11-15 NOTE — FLOWSHEET NOTE
TODAY'S DATE:  11/15/2023      Current NIHSS 0      Discussed personal risk factors for Stroke/TIA with patient/family, and ways to reduce the risk for a recurrent stroke. Patient's personal risk factors which were identified are:   [x]   Alcohol Abuse: check with your physician before any alcohol consumption. []   Atrial fibrillation: may cause blood clots  []   Drug Abuse: Seek help, talk with your doctor  []   Clotting Disorder  [x]   Diabetes  []   Family history of stroke or heart disease  [x]   High Blood Pressure/Hypertension: work with your physician  []   High cholesterol: monitor cholesterol levels with your physician  []   Overweight/Obesity: work with your physician for your ideal body weight  []   Physical Inactivity: get regular exercise as directed by your physician  []   Personal history of previous TIA or stroke  []   Poor Diet: decrease salt (sodium) in your diet, follow diet directed by physician  [x]   Smoking: stop smoking      Reviewed the Following Education with Patient and/or Family:   - Signs and Symptoms of Stroke  - Personal risk factors   - How to activate EMS (911)   - Importance of Follow Up Appointments at Discharge   - Importance of Compliance with Medications Prescribed at Discharge  - Available community resources and stroke advocacy groups if needed    Patient and/or family member: verbalized understanding. Stroke Education booklet given to patient/family (or verified, if given already), which reviews above information.  yes         Electronically signed by Curry Tejeda RN on 11/15/2023 at 10:33 AM

## 2023-11-15 NOTE — PLAN OF CARE
Pt assessed using the NIHS scale with score of 0, reassessed Q 4 hours, no changes or new deficits at this time. Vital signs stable and reassessed Q4 hours. Telemetry monitor continued. Fall precautions in place. Call light within reach. Educated pt on importance of calling out when getting out of bed. Will continue to assess and monitor. Problem: Musculoskeletal - Adult  Goal: Return ADL status to a safe level of function  11/15/2023 1038 by Maryse Agrawal RN  Outcome: Progressing    Problem: Hematologic - Adult  Goal: Maintains hematologic stability  Outcome: Progressing          Pt resting in bed quietly. Bed in lowest position, wheels locked, side rails up X2, non skid socks on. Bed check alarm engaged. Pt instructed not to get out of bed on own, to use call light for staff assistance when ambulating or other needs. Pt verbalizes understanding. Call light within reach. Will continue to monitor.      Problem: Safety - Adult  Goal: Free from fall injury  11/15/2023 1035 by Maryse Agrawal RN  Outcome: Progressing     Problem: ABCDS Injury Assessment  Goal: Absence of physical injury  11/15/2023 1035 by Maryse Agrawal RN  Outcome: Progressing

## 2023-11-15 NOTE — PLAN OF CARE
Problem: Discharge Planning  Goal: Discharge to home or other facility with appropriate resources  Outcome: Progressing     Problem: Skin/Tissue Integrity  Goal: Absence of new skin breakdown  Description: 1. Monitor for areas of redness and/or skin breakdown  2. Assess vascular access sites hourly  3. Every 4-6 hours minimum:  Change oxygen saturation probe site  4. Every 4-6 hours:  If on nasal continuous positive airway pressure, respiratory therapy assess nares and determine need for appliance change or resting period.   Outcome: Progressing     Problem: Safety - Adult  Goal: Free from fall injury  Outcome: Progressing     Problem: ABCDS Injury Assessment  Goal: Absence of physical injury  Outcome: Progressing     Problem: Pain  Goal: Verbalizes/displays adequate comfort level or baseline comfort level  Outcome: Progressing     Problem: Neurosensory - Adult  Goal: Achieves stable or improved neurological status  Outcome: Progressing  Goal: Absence of seizures  Outcome: Progressing  Goal: Remains free of injury related to seizures activity  Outcome: Progressing  Goal: Achieves maximal functionality and self care  Outcome: Progressing     Problem: Musculoskeletal - Adult  Goal: Return mobility to safest level of function  Outcome: Progressing  Goal: Maintain proper alignment of affected body part  Outcome: Progressing  Goal: Return ADL status to a safe level of function  Outcome: Progressing     Problem: Nutrition Deficit:  Goal: Optimize nutritional status  Outcome: Progressing

## 2023-11-15 NOTE — FLOWSHEET NOTE
Pt discharged to family's car via wheelchair by RN. Pt's IV discontinued with no complications noted. Telemetry monitor #112 discontinued/removed; [facility] notified of pt discharging. Discharge instructions explained and paperwork given to pt and family; pt and family verbalize understanding, questions encouraged and answered with no further questions at this time. Medications sent to outpatient pharmacies and to be picked up. Pt sent home with belongings, medications filled and/or scripts, and discharge paperwork.

## 2023-11-15 NOTE — CARE COORDINATION
Adam Schultz \"So I spoke w/Cindy through [facility], pt has been denied by [facility] d/t lack of required diagnosis and his hx of ETOH abuse. Do you feel [place] and I need to keep pushing for a company? and if so will need a concrete diagnosis charted. Thank you so much for your time. 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     Electronically signed by Alejandro Morrell RN on 11/15/2023 at 2:56 PM Patient scheduled and will complete labs.

## 2023-11-15 NOTE — DISCHARGE INSTRUCTIONS
Follow up with PCP within 1-2 weeks. Follow up with neurology when possible so that you can have her leg nerves tested.

## 2023-11-15 NOTE — CARE COORDINATION
[facility]    Referral received from CM to follow for home care services.      [facility] out of network  Patient has no preference in agency    Referral sent to [facility] home care; declined  Referral sent to [address] [place] [address] declined; no billable diagnosis; and etoh abuse    Referral sent to [facility]; declined by Maninder Wang  notified having difficulty finding agency    David Magdaleno RN, BSN CTN [address] [place] [address]   [phone] fax [phone]  [facility] [address] [phone]

## 2023-11-15 NOTE — TELEPHONE ENCOUNTER
Refill Request   Return in about 2 weeks (around 11/2/2023) for Chronic Care Follow Up.      Last Seen: Last Seen Department: 10/19/2023  Last Seen by PCP: 10/19/2023    Last Written: 10/19/23 30 with 0    Next Appointment:   Future Appointments   Date Time Provider [address] [place] [address]   11/16/2023  1:00 PM Jessee Ellsworth DO [facility]         Requested Prescriptions     Pending Prescriptions Disp Refills    escitalopram (LEXAPRO) 5 MG tablet [Pharmacy Med Name: ESCITALOPRAM 5 MG TABLET] 30 tablet 0     Sig: TAKE 1 TABLET BY MOUTH EVERY DAY

## 2023-11-16 ENCOUNTER — OFFICE VISIT (OUTPATIENT)
Dept: PRIMARY CARE CLINIC | Age: 58
End: 2023-11-16

## 2023-11-16 ENCOUNTER — TELEPHONE (OUTPATIENT)
Dept: PRIMARY CARE CLINIC | Age: 58
End: 2023-11-16

## 2023-11-16 VITALS
OXYGEN SATURATION: 99 % | DIASTOLIC BLOOD PRESSURE: 82 MMHG | HEART RATE: 88 BPM | WEIGHT: 184 LBS | SYSTOLIC BLOOD PRESSURE: 142 MMHG | HEIGHT: 74 IN | BODY MASS INDEX: 23.61 KG/M2 | TEMPERATURE: 98.2 F | RESPIRATION RATE: 18 BRPM

## 2023-11-16 DIAGNOSIS — Z86.73 HISTORY OF TIA (TRANSIENT ISCHEMIC ATTACK): ICD-10-CM

## 2023-11-16 DIAGNOSIS — F10.90 ALCOHOL USE DISORDER: ICD-10-CM

## 2023-11-16 DIAGNOSIS — F33.2 SEVERE EPISODE OF RECURRENT MAJOR DEPRESSIVE DISORDER, WITHOUT PSYCHOTIC FEATURES (HCC): ICD-10-CM

## 2023-11-16 DIAGNOSIS — F41.9 ANXIETY: ICD-10-CM

## 2023-11-16 DIAGNOSIS — G62.1 ALCOHOL-INDUCED POLYNEUROPATHY (HCC): ICD-10-CM

## 2023-11-16 DIAGNOSIS — G47.09 OTHER INSOMNIA: ICD-10-CM

## 2023-11-16 DIAGNOSIS — R55 SYNCOPE, UNSPECIFIED SYNCOPE TYPE: Primary | ICD-10-CM

## 2023-11-16 DIAGNOSIS — I10 PRIMARY HYPERTENSION: ICD-10-CM

## 2023-11-16 DIAGNOSIS — Z09 HOSPITAL DISCHARGE FOLLOW-UP: ICD-10-CM

## 2023-11-16 DIAGNOSIS — M51.36 LUMBAR DEGENERATIVE DISC DISEASE: ICD-10-CM

## 2023-11-16 DIAGNOSIS — F17.200 TOBACCO USE DISORDER: ICD-10-CM

## 2023-11-16 DIAGNOSIS — N52.9 ERECTILE DYSFUNCTION, UNSPECIFIED ERECTILE DYSFUNCTION TYPE: ICD-10-CM

## 2023-11-16 RX ORDER — ESCITALOPRAM OXALATE 10 MG/1
10 TABLET ORAL DAILY
Qty: 30 TABLET | Refills: 0 | Status: SHIPPED | OUTPATIENT
Start: 2023-11-16 | End: 2023-12-16

## 2023-11-16 RX ORDER — ALCOHOL 70.47 ML/100ML
GEL TOPICAL
COMMUNITY
Start: 2023-11-15

## 2023-11-16 RX ORDER — ESCITALOPRAM OXALATE 5 MG/1
5 TABLET ORAL DAILY
Qty: 30 TABLET | Refills: 0 | Status: CANCELLED | OUTPATIENT
Start: 2023-11-16

## 2023-11-16 RX ORDER — SILDENAFIL 100 MG/1
100 TABLET, FILM COATED ORAL DAILY PRN
Qty: 15 TABLET | Refills: 0 | Status: SHIPPED | OUTPATIENT
Start: 2023-11-16

## 2023-11-16 ASSESSMENT — PATIENT HEALTH QUESTIONNAIRE - PHQ9
5. POOR APPETITE OR OVEREATING: 3
6. FEELING BAD ABOUT YOURSELF - OR THAT YOU ARE A FAILURE OR HAVE LET YOURSELF OR YOUR FAMILY DOWN: 2
SUM OF ALL RESPONSES TO PHQ QUESTIONS 1-9: 19
1. LITTLE INTEREST OR PLEASURE IN DOING THINGS: 2
10. IF YOU CHECKED OFF ANY PROBLEMS, HOW DIFFICULT HAVE THESE PROBLEMS MADE IT FOR YOU TO DO YOUR WORK, TAKE CARE OF THINGS AT HOME, OR GET ALONG WITH OTHER PEOPLE: 2
4. FEELING TIRED OR HAVING LITTLE ENERGY: 2
SUM OF ALL RESPONSES TO PHQ9 QUESTIONS 1 & 2: 4
9. THOUGHTS THAT YOU WOULD BE BETTER OFF DEAD, OR OF HURTING YOURSELF: 1
SUM OF ALL RESPONSES TO PHQ QUESTIONS 1-9: 18
2. FEELING DOWN, DEPRESSED OR HOPELESS: 2
SUM OF ALL RESPONSES TO PHQ QUESTIONS 1-9: 19
7. TROUBLE CONCENTRATING ON THINGS, SUCH AS READING THE NEWSPAPER OR WATCHING TELEVISION: 2
8. MOVING OR SPEAKING SO SLOWLY THAT OTHER PEOPLE COULD HAVE NOTICED. OR THE OPPOSITE, BEING SO FIGETY OR RESTLESS THAT YOU HAVE BEEN MOVING AROUND A LOT MORE THAN USUAL: 2
SUM OF ALL RESPONSES TO PHQ QUESTIONS 1-9: 19
3. TROUBLE FALLING OR STAYING ASLEEP: 3

## 2023-11-16 ASSESSMENT — COLUMBIA-SUICIDE SEVERITY RATING SCALE - C-SSRS
2. HAVE YOU ACTUALLY HAD ANY THOUGHTS OF KILLING YOURSELF?: NO
7. DID THIS OCCUR IN THE LAST THREE MONTHS: NO
BASED ON RESPONSES TO C-SSRS QS 1-6, WHAT IS THE PATIENT'S OVERALL RISK RATING FOR SUICIDE: NO RISK
4. HAVE YOU HAD THESE THOUGHTS AND HAD SOME INTENTION OF ACTING ON THEM?: NO
1. WITHIN THE PAST MONTH, HAVE YOU WISHED YOU WERE DEAD OR WISHED YOU COULD GO TO SLEEP AND NOT WAKE UP?: NO
3. HAVE YOU BEEN THINKING ABOUT HOW YOU MIGHT KILL YOURSELF?: NO
5. HAVE YOU STARTED TO WORK OUT OR WORKED OUT THE DETAILS OF HOW TO KILL YOURSELF? DO YOU INTEND TO CARRY OUT THIS PLAN?: NO
6. HAVE YOU EVER DONE ANYTHING, STARTED TO DO ANYTHING, OR PREPARED TO DO ANYTHING TO END YOUR LIFE?: NO

## 2023-11-16 ASSESSMENT — ANXIETY QUESTIONNAIRES
3. WORRYING TOO MUCH ABOUT DIFFERENT THINGS: 2
5. BEING SO RESTLESS THAT IT IS HARD TO SIT STILL: 3
6. BECOMING EASILY ANNOYED OR IRRITABLE: 3
2. NOT BEING ABLE TO STOP OR CONTROL WORRYING: 3
1. FEELING NERVOUS, ANXIOUS, OR ON EDGE: 3
GAD7 TOTAL SCORE: 18
4. TROUBLE RELAXING: 2
7. FEELING AFRAID AS IF SOMETHING AWFUL MIGHT HAPPEN: 2
IF YOU CHECKED OFF ANY PROBLEMS ON THIS QUESTIONNAIRE, HOW DIFFICULT HAVE THESE PROBLEMS MADE IT FOR YOU TO DO YOUR WORK, TAKE CARE OF THINGS AT HOME, OR GET ALONG WITH OTHER PEOPLE: VERY DIFFICULT

## 2023-11-16 NOTE — TELEPHONE ENCOUNTER
Care Transitions Initial Follow Up Call    Outreach made within 2 business days of discharge: Yes    Patient: Axel Tapia Patient : 1965   MRN: [id number]  Reason for Admission: There are no discharge diagnoses documented for the most recent discharge.  Discharge Date: 11/15/23       Spoke with: I had to leave a message [phone] (home)       Discharge department/facility: 2023 - 11/15/2023 (2 days)  [facility] Interactive Patient Contact:  Was patient able to fill all prescriptions:   Was patient instructed to bring all medications to the follow-up visit:   Is patient taking all medications as directed in the discharge summary?   Does patient understand their discharge instructions:   Does patient have questions or concerns that need addressed prior to 7-14 day follow up office visit:     Scheduled appointment with PCP within 7-14 days    Follow Up  Future Appointments   Date Time Provider Department Center   2023  1:00 PM Juan Strickland DO [facility]       Ava Saba MA

## 2023-11-16 NOTE — CARE COORDINATION
Spoke w/[facility], most likely will accept pt. Reported they need additional info, message left, awaiting call back.  Electronically signed by Srinivasa Negron RN on 11/16/2023 at 10:59 AM

## 2023-11-16 NOTE — PROGRESS NOTES
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 An MRI showed mild to moderate chronic small vessel ischemic changes but did not highlight any acute infarcts. The patient attempted to leave AMA and did not wait for echo work-up. He was started on ASA 81 mg and statin therapy. Additionally, he was started on lisinopril given persistent HTN. The patient was also likely experiencing acute alcohol withdrawal, at which time CIWA protocol was placed. Patient was started on thiamine supplement at discharge. Patient continues to have baseline bilateral upper extremity tremor on exam today. Neurology evaluated the patient and recommended outpatient EMG. Hospitalist note recommend that PCP set up TTE. Given the patient's history of anxiety, they also noted possible concern for underlying conversion disorder. At his office visit today, patient notes he has improved right upper and lower extremity strength. He continues to have residual sensation deficits in the lower extremity, however, were intact on physical exam today. He continues to have residual and chronic low back pain. Patient notes that hospital supposed to call to schedule TTE or EMG, however, patient has not heard back from them yet. He continues to be anxious during the office visit with bilateral upper extremity tremor as noted above. He continues to have difficulty with insomnia, which was worsened during his hospital stay. Patient notes that he will be only able to sleep for 2 hours at night from 10 PM to 12 AM.  He then gets up and has a cigarette, will shave his face, or watch television until the morning. The patient notes some improvement of his anxiety and depression since his last office visit. He was started on Lexapro 5 mg daily and has not experienced any GI side effects including nausea, vomiting, diarrhea, or constipation.   He already has baseline erectile dysfunction which has not significantly worsened or improved

## 2023-11-16 NOTE — TELEPHONE ENCOUNTER
I know, the call center scheduled pt for an office visit. I had to change it and call him for a TCM questions or we don't get credit for it.

## 2023-11-17 ENCOUNTER — TELEPHONE (OUTPATIENT)
Dept: PRIMARY CARE CLINIC | Age: 58
End: 2023-11-17

## 2023-11-17 PROBLEM — N52.9 ERECTILE DYSFUNCTION: Status: ACTIVE | Noted: 2023-11-17

## 2023-11-17 PROBLEM — I10 PRIMARY HYPERTENSION: Status: ACTIVE | Noted: 2023-11-17

## 2023-11-17 PROBLEM — R29.90 STROKE-LIKE SYMPTOMS: Status: RESOLVED | Noted: 2023-11-13 | Resolved: 2023-11-17

## 2023-11-17 PROBLEM — F10.90 ALCOHOL USE DISORDER: Status: ACTIVE | Noted: 2023-11-17

## 2023-11-17 PROBLEM — M51.36 LUMBAR DEGENERATIVE DISC DISEASE: Status: ACTIVE | Noted: 2023-11-17

## 2023-11-17 PROBLEM — M51.369 LUMBAR DEGENERATIVE DISC DISEASE: Status: ACTIVE | Noted: 2023-11-17

## 2023-11-17 PROBLEM — F41.9 ANXIETY: Status: ACTIVE | Noted: 2023-11-17

## 2023-11-17 PROBLEM — Z86.73 HISTORY OF TIA (TRANSIENT ISCHEMIC ATTACK): Status: ACTIVE | Noted: 2023-11-17

## 2023-11-17 PROBLEM — G47.09 OTHER INSOMNIA: Status: ACTIVE | Noted: 2023-11-17

## 2023-11-17 PROBLEM — R41.0 DISORIENTATION: Status: RESOLVED | Noted: 2023-11-15 | Resolved: 2023-11-17

## 2023-11-17 PROBLEM — F17.200 TOBACCO USE DISORDER: Status: ACTIVE | Noted: 2023-11-17

## 2023-11-17 PROBLEM — G62.1 ALCOHOL-INDUCED POLYNEUROPATHY (HCC): Status: ACTIVE | Noted: 2023-11-17

## 2023-11-17 PROBLEM — F33.2 SEVERE EPISODE OF RECURRENT MAJOR DEPRESSIVE DISORDER, WITHOUT PSYCHOTIC FEATURES (HCC): Status: ACTIVE | Noted: 2023-11-17

## 2023-11-17 ASSESSMENT — ENCOUNTER SYMPTOMS
DIARRHEA: 0
BACK PAIN: 1
WHEEZING: 0
CHEST TIGHTNESS: 0
ABDOMINAL PAIN: 0
SHORTNESS OF BREATH: 0
VOMITING: 0
COUGH: 0
CONSTIPATION: 0
NAUSEA: 0

## 2023-11-17 NOTE — TELEPHONE ENCOUNTER
Call placed to this patient, patient was unable to write down the information at the time of the call. Patient states that he will call back at a later time to get the information.  Please see info below;    [address] [facility] Neurology - Florencio Villalba MD  [address] David Duran [address] [place] [address]  [phone]

## 2023-11-17 NOTE — TELEPHONE ENCOUNTER
----- Message from Qing Sotelo DO sent at 11/17/2023 12:05 PM EST -----  Regarding: EMG Referral  Please call and provide the patient with the information to schedule his EMG if Neurology has not already reached out to him to schedule. It will be under Other Orders and the telephone number is inside the referral. Thank you.

## 2023-11-18 NOTE — ED PROVIDER NOTES
I independently evaluated and obtained a history and physical on [place]. I personally saw the patient and performed a substantive portion of the visit including all aspects of the medical decision making. All diagnostic, treatment, and disposition assistants were made to myself in conjunction the advanced practice provider. For further details of this patient's emergency department encounter, please see the advanced practice provider's documentation. History: The patient is a 81-year-old male with history of chronic pain, hypertension, and alcohol use who presents due to acute onset of altered mental status for patient is unaware of his own name or where he is. Patient's wife reports that the patient has only had 2 alcoholic beverages in the past 24 hours and does not think that is contributing to his symptoms. She reports that the patient believes his name is Shelia Rice (which is the patient's son's name but not the name the patient himself has never gone by) and that he is currently in [place]. The patient otherwise denies any recent fever or infectious symptoms or any falls or trauma. Physician Exam: Awake and alert  male. Talkative with no facial droop but incorrectly identifies name, year, and place. Follows commands with all 4 extremities however has mild weakness in left upper and lower extremity and moderate to significant weakness in right upper and lower extremity. No facial droop. MDM:    I personally saw the patient and performed a substantive portion of the visit including all aspects of the medical decision making.     Concern given for new neurologic deficit and code stroke immediately called upon patient's arrival.  Stroke imaging including Noncon head CT and CTA head and neck is performed the emergency department read by radiology as well as dependently reviewed by myself and does not show any evidence of bleed or acute emergent intracranial pathology but does show
- 52.5 %    .2 (H) 80.0 - 100.0 fL    MCH 34.8 (H) 26.0 - 34.0 pg    MCHC 34.0 31.0 - 36.0 g/dL    RDW 13.2 12.4 - 15.4 %    Platelets 86 (L) 585 - 450 K/uL    MPV 8.0 5.0 - 10.5 fL    PLATELET SLIDE REVIEW Decreased     SLIDE REVIEW see below     Neutrophils % 23.7 %    Lymphocytes % 62.3 %    Monocytes % 10.4 %    Eosinophils % 1.2 %    Basophils % 2.4 %    Neutrophils Absolute 1.0 (L) 1.7 - 7.7 K/uL    Lymphocytes Absolute 2.7 1.0 - 5.1 K/uL    Monocytes Absolute 0.5 0.0 - 1.3 K/uL    Eosinophils Absolute 0.1 0.0 - 0.6 K/uL    Basophils Absolute 0.1 0.0 - 0.2 K/uL   Comprehensive Metabolic Panel w/ Reflex to MG   Result Value Ref Range    Sodium 140 136 - 145 mmol/L    Potassium reflex Magnesium 3.5 3.5 - 5.1 mmol/L    Chloride 99 99 - 110 mmol/L    CO2 25 21 - 32 mmol/L    Anion Gap 16 3 - 16    Glucose 178 (H) 70 - 99 mg/dL    BUN 7 7 - 20 mg/dL    Creatinine 0.7 (L) 0.9 - 1.3 mg/dL    Est, Glom Filt Rate >60 >60    Calcium 9.1 8.3 - 10.6 mg/dL    Total Protein 8.1 6.4 - 8.2 g/dL    Albumin 4.5 3.4 - 5.0 g/dL    Albumin/Globulin Ratio 1.3 1.1 - 2.2    Total Bilirubin 1.2 (H) 0.0 - 1.0 mg/dL    Alkaline Phosphatase 93 40 - 129 U/L    ALT 77 (H) 10 - 40 U/L     (H) 15 - 37 U/L   Troponin   Result Value Ref Range    Troponin, High Sensitivity 9 0 - 22 ng/L   Troponin   Result Value Ref Range    Troponin, High Sensitivity 9 0 - 22 ng/L   Urinalysis with Reflex to Culture    Specimen: Urine   Result Value Ref Range    Color, UA Yellow Straw/Yellow    Clarity, UA SL CLOUDY (A) Clear    Glucose, Ur Negative Negative mg/dL    Bilirubin Urine Negative Negative    Ketones, Urine Negative Negative mg/dL    Specific Gravity, UA 1.010 1.005 - 1.030    Blood, Urine LARGE (A) Negative    pH, UA 6.0 5.0 - 8.0    Protein, UA TRACE (A) Negative mg/dL    Urobilinogen, Urine 1.0 <2.0 E.U./dL    Nitrite, Urine Negative Negative    Leukocyte Esterase, Urine Negative Negative    Microscopic Examination YES     Urine

## 2023-11-22 LAB
GLUCOSE BLD-MCNC: 179 MG/DL (ref 70–99)
PERFORMED ON: ABNORMAL

## 2023-12-08 DIAGNOSIS — F41.9 ANXIETY: ICD-10-CM

## 2023-12-08 RX ORDER — ESCITALOPRAM OXALATE 10 MG/1
10 TABLET ORAL DAILY
Qty: 30 TABLET | Refills: 0 | Status: SHIPPED | OUTPATIENT
Start: 2023-12-08 | End: 2024-01-07

## 2023-12-08 NOTE — TELEPHONE ENCOUNTER
Refill Request       Last Seen: Last Seen Department: 11/16/2023  Last Seen by PCP: 11/16/2023    Last Written: 11/16/2023 30 with 0    Next Appointment:   Future Appointments   Date Time Provider [address]   12/14/2023  1:00 PM Matt Pavon DO [facility]             Requested Prescriptions     Pending Prescriptions Disp Refills    escitalopram (LEXAPRO) 10 MG tablet 30 tablet 0     Sig: Take 1 tablet by mouth daily

## 2024-11-24 ENCOUNTER — TELEPHONE (OUTPATIENT)
Dept: CASE MANAGEMENT | Age: 59
End: 2024-11-24

## 2024-11-24 NOTE — TELEPHONE ENCOUNTER
Patient has not been seen in office in > 1 year, please call and schedule for appt before placing new order for LDCT in the event patient has new PCP.

## 2024-11-24 NOTE — TELEPHONE ENCOUNTER
Patient due for Annual CT Lung Screening.    Thank you,  Melissa Diaz RN  [facility] Lung Navigator  [phone]

## 2024-12-04 ENCOUNTER — TELEPHONE (OUTPATIENT)
Dept: TELEMETRY | Age: 59
End: 2024-12-04

## 2024-12-04 NOTE — TELEPHONE ENCOUNTER
Patient due for annual CT Lung Screening. Reminder letter mailed.    Per documentation, pt needs to be seen by PCP prior to screening.    Yovana Baron RN

## 2025-01-03 ENCOUNTER — TELEPHONE (OUTPATIENT)
Dept: TELEMETRY | Age: 60
End: 2025-01-03

## 2025-01-03 NOTE — TELEPHONE ENCOUNTER
Patient due for annual CT Lung Screening. Reminder letter mailed.    Pt still needing PCP visit prior to screening order. Final notice. Three reminder letters mailed.    Yovana Baron RN